# Patient Record
Sex: MALE | Race: WHITE | NOT HISPANIC OR LATINO
[De-identification: names, ages, dates, MRNs, and addresses within clinical notes are randomized per-mention and may not be internally consistent; named-entity substitution may affect disease eponyms.]

---

## 2019-07-22 ENCOUNTER — APPOINTMENT (OUTPATIENT)
Dept: OTOLARYNGOLOGY | Facility: CLINIC | Age: 79
End: 2019-07-22
Payer: MEDICARE

## 2019-07-22 VITALS
SYSTOLIC BLOOD PRESSURE: 167 MMHG | DIASTOLIC BLOOD PRESSURE: 104 MMHG | HEIGHT: 70 IN | HEART RATE: 77 BPM | WEIGHT: 208 LBS | BODY MASS INDEX: 29.78 KG/M2

## 2019-07-22 VITALS — HEART RATE: 89 BPM | SYSTOLIC BLOOD PRESSURE: 159 MMHG | DIASTOLIC BLOOD PRESSURE: 93 MMHG

## 2019-07-22 DIAGNOSIS — Z86.39 PERSONAL HISTORY OF OTHER ENDOCRINE, NUTRITIONAL AND METABOLIC DISEASE: ICD-10-CM

## 2019-07-22 DIAGNOSIS — Z86.79 PERSONAL HISTORY OF OTHER DISEASES OF THE CIRCULATORY SYSTEM: ICD-10-CM

## 2019-07-22 DIAGNOSIS — J38.2 NODULES OF VOCAL CORDS: ICD-10-CM

## 2019-07-22 PROCEDURE — 31575 DIAGNOSTIC LARYNGOSCOPY: CPT

## 2019-07-22 PROCEDURE — 99204 OFFICE O/P NEW MOD 45 MIN: CPT | Mod: 25

## 2019-07-22 RX ORDER — ALLOPURINOL 100 MG/1
100 TABLET ORAL
Refills: 0 | Status: ACTIVE | COMMUNITY

## 2019-07-22 RX ORDER — RIVAROXABAN 15 MG/1
15 TABLET, FILM COATED ORAL
Refills: 0 | Status: ACTIVE | COMMUNITY

## 2019-07-22 NOTE — PHYSICAL EXAM
[Laryngoscopy Performed] : laryngoscopy was performed, see procedure section for findings [Normal] : no rashes [FreeTextEntry1] : No concerning lesions in the OC/OPx on inspection or palpation.\par

## 2019-07-22 NOTE — PROCEDURE
[Lesion] : lesion identified by mirror examination needing further evaluation [Flexible Endoscope] : examined with the flexible endoscope [None] : none [de-identified] : No lesions in the NPx, OPx, HPx.  Exam of the larynx with irregular leukoplakic lesion involving the left anterior VC.  VC are mobile, airway patent.\par

## 2019-07-22 NOTE — CONSULT LETTER
[Dear  ___] : Dear  [unfilled], [Consult Letter:] : I had the pleasure of evaluating your patient, [unfilled]. [Consult Closing:] : Thank you very much for allowing me to participate in the care of this patient.  If you have any questions, please do not hesitate to contact me. [Please see my note below.] : Please see my note below. [Sincerely,] : Sincerely, [FreeTextEntry2] : Dr. Pablo Paz\[address],\[place] [address] [FreeTextEntry3] : Dev

## 2019-08-06 ENCOUNTER — OUTPATIENT (OUTPATIENT)
Dept: OUTPATIENT SERVICES | Facility: HOSPITAL | Age: 79
LOS: 1 days | End: 2019-08-06
Payer: MEDICARE

## 2019-08-06 VITALS
HEART RATE: 62 BPM | SYSTOLIC BLOOD PRESSURE: 140 MMHG | HEIGHT: 69 IN | WEIGHT: 205.91 LBS | RESPIRATION RATE: 16 BRPM | DIASTOLIC BLOOD PRESSURE: 86 MMHG | TEMPERATURE: 97 F

## 2019-08-06 DIAGNOSIS — Z98.890 OTHER SPECIFIED POSTPROCEDURAL STATES: Chronic | ICD-10-CM

## 2019-08-06 DIAGNOSIS — J38.3 OTHER DISEASES OF VOCAL CORDS: ICD-10-CM

## 2019-08-06 LAB
ANION GAP SERPL CALC-SCNC: 13 MMO/L — SIGNIFICANT CHANGE UP (ref 7–14)
BUN SERPL-MCNC: 20 MG/DL — SIGNIFICANT CHANGE UP (ref 7–23)
CALCIUM SERPL-MCNC: 9.7 MG/DL — SIGNIFICANT CHANGE UP (ref 8.4–10.5)
CHLORIDE SERPL-SCNC: 101 MMOL/L — SIGNIFICANT CHANGE UP (ref 98–107)
CO2 SERPL-SCNC: 25 MMOL/L — SIGNIFICANT CHANGE UP (ref 22–31)
CREAT SERPL-MCNC: 1.65 MG/DL — HIGH (ref 0.5–1.3)
GLUCOSE SERPL-MCNC: 129 MG/DL — HIGH (ref 70–99)
HBA1C BLD-MCNC: 6.6 % — HIGH (ref 4–5.6)
HCT VFR BLD CALC: 49.9 % — SIGNIFICANT CHANGE UP (ref 39–50)
HGB BLD-MCNC: 16.5 G/DL — SIGNIFICANT CHANGE UP (ref 13–17)
MCHC RBC-ENTMCNC: 30.1 PG — SIGNIFICANT CHANGE UP (ref 27–34)
MCHC RBC-ENTMCNC: 33.1 % — SIGNIFICANT CHANGE UP (ref 32–36)
MCV RBC AUTO: 90.9 FL — SIGNIFICANT CHANGE UP (ref 80–100)
NRBC # FLD: 0 K/UL — SIGNIFICANT CHANGE UP (ref 0–0)
PLATELET # BLD AUTO: 188 K/UL — SIGNIFICANT CHANGE UP (ref 150–400)
PMV BLD: 11.2 FL — SIGNIFICANT CHANGE UP (ref 7–13)
POTASSIUM SERPL-MCNC: 4.1 MMOL/L — SIGNIFICANT CHANGE UP (ref 3.5–5.3)
POTASSIUM SERPL-SCNC: 4.1 MMOL/L — SIGNIFICANT CHANGE UP (ref 3.5–5.3)
RBC # BLD: 5.49 M/UL — SIGNIFICANT CHANGE UP (ref 4.2–5.8)
RBC # FLD: 13.6 % — SIGNIFICANT CHANGE UP (ref 10.3–14.5)
SODIUM SERPL-SCNC: 139 MMOL/L — SIGNIFICANT CHANGE UP (ref 135–145)
WBC # BLD: 7.77 K/UL — SIGNIFICANT CHANGE UP (ref 3.8–10.5)
WBC # FLD AUTO: 7.77 K/UL — SIGNIFICANT CHANGE UP (ref 3.8–10.5)

## 2019-08-06 PROCEDURE — 93010 ELECTROCARDIOGRAM REPORT: CPT

## 2019-08-06 RX ORDER — SODIUM CHLORIDE 9 MG/ML
1000 INJECTION, SOLUTION INTRAVENOUS
Refills: 0 | Status: DISCONTINUED | OUTPATIENT
Start: 2019-08-08 | End: 2019-08-30

## 2019-08-06 NOTE — H&P PST ADULT - LYMPHATIC
supraclavicular L/posterior cervical L/anterior cervical L/anterior cervical R/posterior cervical R/supraclavicular R

## 2019-08-06 NOTE — H&P PST ADULT - NSICDXPASTMEDICALHX_GEN_ALL_CORE_FT
PAST MEDICAL HISTORY:  Atrial fibrillation     GERD (gastroesophageal reflux disease)     Gout     Hypertension     Obesity     Other diseases of vocal cords     Reflux     Renal cancer     Type 2 diabetes mellitus

## 2019-08-06 NOTE — H&P PST ADULT - NSICDXPROBLEM_GEN_ALL_CORE_FT
other diseases of vocal cord:  direct laryngoscopy with biopsy, esophagoscopy on 8/8/2019  Preop instructions, gi prophylaxis   pt verbalized understanding    Hypertension:  continue medication per routine schedule    Atrial fibrillation:  on     Type II DM:  Fs on admit other diseases of vocal cord:  direct laryngoscopy with biopsy, esophagoscopy on 8/8/2019  Preop instructions, gi prophylaxis   pt verbalized understanding    Hypertension:  continue medication per routine schedule  LINDA precautions recommended.  OR booking notified via fax.    Atrial fibrillation:  on Xarelto- last dose 8/5/19, per pt instructed by cardiologist   cardiology eval requested by surgeon  pending copy of most recent echo/stress and cardiology eval report     Type II DM:  Fs on admit  Last dose Januvia on 8/6/19 pm dose

## 2019-08-06 NOTE — H&P PST ADULT - HISTORY OF PRESENT ILLNESS
78y/o male 80y/o male presents for preop eval for scheduled direct laryngoscopy with biopsy, esophagoscopy on 8/8/2019.  Pt with c/o voice disturbance for few years.  Per pt recent ENT eval done and was told has vocal cord lesions.  Preop dx other diseases of vocal cords.

## 2019-08-07 ENCOUNTER — TRANSCRIPTION ENCOUNTER (OUTPATIENT)
Age: 79
End: 2019-08-07

## 2019-08-07 NOTE — ASU PATIENT PROFILE, ADULT - TEACHING/LEARNING CULTURAL CONSIDERATIONS
Detail Level: Zone
Discussed with patient of all possible trigger factors such as stress induced and weather changes. Explained to patient that the condition is chronic. Will start patient on a topical treatment plan.
Clinical photo was taken today for biopsy purpose.
none

## 2019-08-07 NOTE — ASU PATIENT PROFILE, ADULT - PMH
Atrial fibrillation    GERD (gastroesophageal reflux disease)    Gout    Hypertension    Obesity    Other diseases of vocal cords    Reflux    Renal cancer    Type 2 diabetes mellitus

## 2019-08-08 ENCOUNTER — RESULT REVIEW (OUTPATIENT)
Age: 79
End: 2019-08-08

## 2019-08-08 ENCOUNTER — APPOINTMENT (OUTPATIENT)
Dept: OTOLARYNGOLOGY | Facility: HOSPITAL | Age: 79
End: 2019-08-08

## 2019-08-08 ENCOUNTER — OUTPATIENT (OUTPATIENT)
Dept: OUTPATIENT SERVICES | Facility: HOSPITAL | Age: 79
LOS: 1 days | Discharge: HOME | End: 2019-08-08
Payer: MEDICARE

## 2019-08-08 VITALS
DIASTOLIC BLOOD PRESSURE: 85 MMHG | RESPIRATION RATE: 16 BRPM | HEART RATE: 70 BPM | OXYGEN SATURATION: 100 % | SYSTOLIC BLOOD PRESSURE: 138 MMHG

## 2019-08-08 VITALS
OXYGEN SATURATION: 96 % | WEIGHT: 205.91 LBS | HEIGHT: 69 IN | TEMPERATURE: 98 F | RESPIRATION RATE: 14 BRPM | DIASTOLIC BLOOD PRESSURE: 98 MMHG | HEART RATE: 80 BPM | SYSTOLIC BLOOD PRESSURE: 142 MMHG

## 2019-08-08 DIAGNOSIS — J38.3 OTHER DISEASES OF VOCAL CORDS: ICD-10-CM

## 2019-08-08 DIAGNOSIS — Z98.890 OTHER SPECIFIED POSTPROCEDURAL STATES: Chronic | ICD-10-CM

## 2019-08-08 PROCEDURE — 43191 ESOPHAGOSCOPY RIGID TRNSO DX: CPT | Mod: GC

## 2019-08-08 PROCEDURE — 88305 TISSUE EXAM BY PATHOLOGIST: CPT | Mod: 26

## 2019-08-08 PROCEDURE — 31541 LARYNSCOP W/TUMR EXC + SCOPE: CPT | Mod: GC

## 2019-08-08 PROCEDURE — 88331 PATH CONSLTJ SURG 1 BLK 1SPC: CPT | Mod: 26

## 2019-08-08 RX ORDER — ACETAMINOPHEN 500 MG
650 TABLET ORAL EVERY 6 HOURS
Refills: 0 | Status: DISCONTINUED | OUTPATIENT
Start: 2019-08-08 | End: 2019-08-30

## 2019-08-08 RX ORDER — ACETAMINOPHEN 500 MG
20.31 TABLET ORAL
Qty: 0 | Refills: 0 | DISCHARGE
Start: 2019-08-08

## 2019-08-08 RX ADMIN — Medication 650 MILLIGRAM(S): at 19:30

## 2019-08-08 NOTE — ASU DISCHARGE PLAN (ADULT/PEDIATRIC) - CARE PROVIDER_API CALL
Ron Gibbons)  Otolaryngology  [address] [place] [address]  Phone: [phone]  Fax: [phone]  Follow Up Time:

## 2019-08-12 ENCOUNTER — APPOINTMENT (OUTPATIENT)
Dept: OTOLARYNGOLOGY | Facility: CLINIC | Age: 79
End: 2019-08-12
Payer: MEDICARE

## 2019-08-12 VITALS
WEIGHT: 208 LBS | DIASTOLIC BLOOD PRESSURE: 86 MMHG | BODY MASS INDEX: 29.78 KG/M2 | HEIGHT: 70 IN | HEART RATE: 81 BPM | SYSTOLIC BLOOD PRESSURE: 144 MMHG

## 2019-08-12 PROBLEM — I48.91 UNSPECIFIED ATRIAL FIBRILLATION: Chronic | Status: ACTIVE | Noted: 2019-08-06

## 2019-08-12 PROBLEM — K21.9 GASTRO-ESOPHAGEAL REFLUX DISEASE WITHOUT ESOPHAGITIS: Chronic | Status: ACTIVE | Noted: 2019-08-06

## 2019-08-12 PROBLEM — E66.9 OBESITY, UNSPECIFIED: Chronic | Status: ACTIVE | Noted: 2019-08-06

## 2019-08-12 PROBLEM — M10.9 GOUT, UNSPECIFIED: Chronic | Status: ACTIVE | Noted: 2019-08-06

## 2019-08-12 PROCEDURE — 31575 DIAGNOSTIC LARYNGOSCOPY: CPT

## 2019-08-12 PROCEDURE — 99214 OFFICE O/P EST MOD 30 MIN: CPT | Mod: 25

## 2019-08-12 NOTE — PHYSICAL EXAM
[Normal] : no rashes [Laryngoscopy Performed] : laryngoscopy was performed, see procedure section for findings [FreeTextEntry1] : No concerning lesions in the OC/OPx on inspection or palpation.\par

## 2019-08-12 NOTE — PROCEDURE
[Lesion] : lesion identified by mirror examination needing further evaluation [Flexible Endoscope] : examined with the flexible endoscope [None] : none [Serial Number: ___] : Serial Number: [unfilled] [de-identified] : No lesions in the NPx, OPx, HPx. Exam of the larynx with irregular leukoplakic lesion involving the left anterior VC. VC are mobile, airway patent.

## 2019-08-12 NOTE — HISTORY OF PRESENT ILLNESS
[de-identified] : 78 y/o male refer by Dr. Paz for left vocal cord lesion. pt has hx of hoarseness on and off and recently went to see Dr. Paz and did laryngoscopy showed left VC with rough irregularity. + ETOH - beers 2-3, quit 20 yrs ago smoking.  He has an approx. 120 pack year history. 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  He has no major c.o, mild sore throat, no dysphagia, dyspnea, or weight loss or  hemoptysis.

## 2019-08-12 NOTE — CONSULT LETTER
[Dear  ___] : Dear  [unfilled], [Courtesy Letter:] : I had the pleasure of seeing your patient, [unfilled], in my office today. [Please see my note below.] : Please see my note below. [Consult Closing:] : Thank you very much for allowing me to participate in the care of this patient.  If you have any questions, please do not hesitate to contact me. [Sincerely,] : Sincerely, [FreeTextEntry3] : Dev [FreeTextEntry2] : Dr. Pablo Paz\[address],\[place] [address]

## 2019-08-22 ENCOUNTER — OUTPATIENT (OUTPATIENT)
Dept: OUTPATIENT SERVICES | Facility: HOSPITAL | Age: 79
LOS: 1 days | Discharge: HOME | End: 2019-08-22
Payer: MEDICARE

## 2019-08-22 ENCOUNTER — APPOINTMENT (OUTPATIENT)
Dept: RADIATION ONCOLOGY | Facility: CLINIC | Age: 79
End: 2019-08-22
Payer: MEDICARE

## 2019-08-22 VITALS
HEART RATE: 76 BPM | SYSTOLIC BLOOD PRESSURE: 142 MMHG | WEIGHT: 206 LBS | BODY MASS INDEX: 29.56 KG/M2 | RESPIRATION RATE: 17 BRPM | DIASTOLIC BLOOD PRESSURE: 85 MMHG | OXYGEN SATURATION: 94 %

## 2019-08-22 DIAGNOSIS — Z98.890 OTHER SPECIFIED POSTPROCEDURAL STATES: Chronic | ICD-10-CM

## 2019-08-22 PROCEDURE — 77263 THER RADIOLOGY TX PLNG CPLX: CPT

## 2019-08-22 PROCEDURE — 92511 NASOPHARYNGOSCOPY: CPT

## 2019-08-22 PROCEDURE — 99204 OFFICE O/P NEW MOD 45 MIN: CPT | Mod: 25

## 2019-08-22 NOTE — PHYSICAL EXAM
[Normal] : normoactive bowel sounds, soft and nontender, no hepatosplenomegaly or masses appreciated [de-identified] : partially edentulous [de-identified] : surgical scar right knee, history of tendon repair

## 2019-08-22 NOTE — DISEASE MANAGEMENT
[Clinical] : TNM Stage: c [I] : I [FreeTextEntry4] : squamous cell carcinoma [NTNM] : 0 [MTNM] : 0 [TTNM] : 1b

## 2019-08-22 NOTE — VITALS
[Maximal Pain Intensity: 0/10] : 0/10 [Least Pain Intensity: 0/10] : 0/10 [NoTreatment Scheduled] : no treatment scheduled [ECOG Performance Status: 1 - Restricted in physically strenuous activity but ambulatory and able to carry out work of a light or sedentary nature] : Performance Status: 1 - Restricted in physically strenuous activity but ambulatory and able to carry out work of a light or sedentary nature, e.g., light house work, office work [0 - No Distress] : Distress Level: 0 [90: Able to carry normal activity; minor signs or symptoms of disease.] : 90: Able to carry normal activity; minor signs or symptoms of disease.

## 2019-08-22 NOTE — REVIEW OF SYSTEMS
[Hoarseness] : hoarseness [Negative] : Heme/Lymph [Dysphagia] : no dysphagia [Loss of Hearing] : no loss of hearing [Nosebleeds] : no nosebleeds [Odynophagia] : no odynophagia [Mucosal Pain] : no mucosal pain [Palpitations] : no palpitations [Chest Pain] : no chest pain [Leg Claudication] : no intermittent leg claudication [Lower Ext Edema] : no lower extremity edema [FreeTextEntry4] : intermittent hoarseness  [FreeTextEntry5] : a-fib [FreeTextEntry9] : gout [de-identified] : diabetes

## 2019-08-22 NOTE — LETTER CLOSING
[Consult Closing:] : Thank you for allowing me to participate in the care of this patient.  If you have any questions, please do not hesitate to contact me. [Sincerely yours,] : Sincerely yours, [FreeTextEntry3] : Dominick Loaiza MD\par Physician in Chief\par Department of Radiation Medicine\par [facility]\par [facility]\par \par  of Radiation Medicine\par [facility]\par at  [facility]\par \par Radiation \par [facility]/\par [facility] [place]\par [address] [facility]\par [place], [place] [address]\par \par Tel: [phone]\par Fax: [phone]\par

## 2019-08-22 NOTE — LETTER GREETING
[Dear Doctor] : Dear Doctor, [Consult Letter:] : Your patient, [unfilled] was seen in my office today for consultation. [Please see my note below.] : Please see my note below. [FreeTextEntry2] : Ron Gibbons MD

## 2019-08-22 NOTE — HISTORY OF PRESENT ILLNESS
[FreeTextEntry1] : This 80 y/o male presents for radiation medicine consultation accompanied by his daughter Tamy.  He was initially referred to Dr Gibbons by Dr. 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  Patient reports he had intermittent hoarseness and went to see Dr. Paz.  Laryngoscopy showed left VC with rough irregularity.  Mr. Zarate is now s/p post direct laryngoscopy with bx on 8/8/2019 which revealed a squamous cell carcinoma suspicious for infiltration .   He will be scheduling PET/CT as soon as possible.  He also plans visit his dentist for clearance.  He reports a permanent upper denture and removable lower denture.\par

## 2019-08-22 NOTE — REASON FOR VISIT
[Consideration of Curative Therapy] : consideration of curative therapy for [Head and Neck Cancer] : head and neck cancer [Family Member] : family member

## 2019-08-28 ENCOUNTER — FORM ENCOUNTER (OUTPATIENT)
Age: 79
End: 2019-08-28

## 2019-08-29 ENCOUNTER — APPOINTMENT (OUTPATIENT)
Dept: NUCLEAR MEDICINE | Facility: CLINIC | Age: 79
End: 2019-08-29
Payer: MEDICARE

## 2019-08-29 ENCOUNTER — OUTPATIENT (OUTPATIENT)
Dept: OUTPATIENT SERVICES | Facility: HOSPITAL | Age: 79
LOS: 1 days | End: 2019-08-29

## 2019-08-29 DIAGNOSIS — Z98.890 OTHER SPECIFIED POSTPROCEDURAL STATES: Chronic | ICD-10-CM

## 2019-08-29 DIAGNOSIS — C32.0 MALIGNANT NEOPLASM OF GLOTTIS: ICD-10-CM

## 2019-08-29 PROCEDURE — 78815 PET IMAGE W/CT SKULL-THIGH: CPT | Mod: 26,PI

## 2019-09-23 ENCOUNTER — APPOINTMENT (OUTPATIENT)
Dept: OTOLARYNGOLOGY | Facility: CLINIC | Age: 79
End: 2019-09-23
Payer: MEDICARE

## 2019-09-23 VITALS
WEIGHT: 204 LBS | HEART RATE: 84 BPM | HEIGHT: 70 IN | DIASTOLIC BLOOD PRESSURE: 103 MMHG | SYSTOLIC BLOOD PRESSURE: 169 MMHG | BODY MASS INDEX: 29.2 KG/M2

## 2019-09-23 PROCEDURE — 31575 DIAGNOSTIC LARYNGOSCOPY: CPT

## 2019-09-23 PROCEDURE — 99214 OFFICE O/P EST MOD 30 MIN: CPT | Mod: 25

## 2019-09-23 NOTE — CONSULT LETTER
[Dear  ___] : Dear  [unfilled], [Courtesy Letter:] : I had the pleasure of seeing your patient, [unfilled], in my office today. [Please see my note below.] : Please see my note below. [Consult Closing:] : Thank you very much for allowing me to participate in the care of this patient.  If you have any questions, please do not hesitate to contact me. [Sincerely,] : Sincerely, [FreeTextEntry2] : Dr. Pablo Paz\[address],\[place] [address]  [FreeTextEntry3] : Dev

## 2019-09-23 NOTE — PROCEDURE
[Lesion] : lesion identified by mirror examination needing further evaluation [None] : none [Flexible Endoscope] : examined with the flexible endoscope [Serial Number: ___] : Serial Number: [unfilled] [de-identified] : No lesions in the NPx, OPx, HPx. Exam of the larynx with irregular leukoplakic lesion involving the left anterior VC. VC are mobile, airway patent.

## 2019-09-23 NOTE — HISTORY OF PRESENT ILLNESS
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 pt has hx of hoarseness on and off and recently went to see Dr. Paz and did laryngoscopy showed left VC with rough irregularity. + ETOH - beers 2-3, quit 20 yrs ago smoking.  He has an approx. 120 pack year history. 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  He has no major c.o, mild sore throat, no dysphagia, dyspnea, or weight loss or  hemoptysis. pt is being f/u dr. Loaiza pending  radiation tx after dentist clearance. pt had clearance for dentist and awaiting for Dr. Loaiza to sched. pt is also here for PET ct result.

## 2019-09-24 ENCOUNTER — FORM ENCOUNTER (OUTPATIENT)
Age: 79
End: 2019-09-24

## 2019-09-25 ENCOUNTER — OUTPATIENT (OUTPATIENT)
Dept: OUTPATIENT SERVICES | Facility: HOSPITAL | Age: 79
LOS: 1 days | End: 2019-09-25
Payer: MEDICARE

## 2019-09-25 ENCOUNTER — APPOINTMENT (OUTPATIENT)
Dept: CT IMAGING | Facility: CLINIC | Age: 79
End: 2019-09-25
Payer: MEDICARE

## 2019-09-25 DIAGNOSIS — Z98.890 OTHER SPECIFIED POSTPROCEDURAL STATES: Chronic | ICD-10-CM

## 2019-09-25 DIAGNOSIS — C32.0 MALIGNANT NEOPLASM OF GLOTTIS: ICD-10-CM

## 2019-09-25 DIAGNOSIS — R91.1 SOLITARY PULMONARY NODULE: ICD-10-CM

## 2019-09-25 PROCEDURE — 71250 CT THORAX DX C-: CPT

## 2019-09-25 PROCEDURE — 71250 CT THORAX DX C-: CPT | Mod: 26

## 2019-10-07 PROCEDURE — 77300 RADIATION THERAPY DOSE PLAN: CPT | Mod: 26

## 2019-10-07 PROCEDURE — 77301 RADIOTHERAPY DOSE PLAN IMRT: CPT | Mod: 26

## 2019-10-07 PROCEDURE — 77338 DESIGN MLC DEVICE FOR IMRT: CPT | Mod: 26

## 2019-10-15 ENCOUNTER — APPOINTMENT (OUTPATIENT)
Dept: THORACIC SURGERY | Facility: CLINIC | Age: 79
End: 2019-10-15
Payer: MEDICARE

## 2019-10-15 ENCOUNTER — APPOINTMENT (OUTPATIENT)
Dept: PULMONOLOGY | Facility: CLINIC | Age: 79
End: 2019-10-15
Payer: MEDICARE

## 2019-10-15 VITALS
BODY MASS INDEX: 29.62 KG/M2 | HEART RATE: 84 BPM | WEIGHT: 200 LBS | SYSTOLIC BLOOD PRESSURE: 158 MMHG | TEMPERATURE: 97.7 F | RESPIRATION RATE: 16 BRPM | OXYGEN SATURATION: 95 % | DIASTOLIC BLOOD PRESSURE: 98 MMHG | HEIGHT: 69 IN

## 2019-10-15 PROCEDURE — 94729 DIFFUSING CAPACITY: CPT

## 2019-10-15 PROCEDURE — 94060 EVALUATION OF WHEEZING: CPT

## 2019-10-15 PROCEDURE — 99205 OFFICE O/P NEW HI 60 MIN: CPT

## 2019-10-15 PROCEDURE — 94726 PLETHYSMOGRAPHY LUNG VOLUMES: CPT

## 2019-10-15 RX ORDER — LANSOPRAZOLE 30 MG/1
30 CAPSULE, DELAYED RELEASE ORAL
Refills: 0 | Status: ACTIVE | COMMUNITY

## 2019-10-15 RX ORDER — ALPRAZOLAM 0.25 MG/1
0.25 TABLET ORAL
Qty: 2 | Refills: 0 | Status: DISCONTINUED | COMMUNITY
Start: 2019-08-28 | End: 2019-10-15

## 2019-10-15 RX ORDER — LANSOPRAZOLE 30 MG/1
CAPSULE, DELAYED RELEASE ORAL
Refills: 0 | Status: DISCONTINUED | COMMUNITY
End: 2019-10-15

## 2019-10-17 ENCOUNTER — OUTPATIENT (OUTPATIENT)
Dept: OUTPATIENT SERVICES | Facility: HOSPITAL | Age: 79
LOS: 1 days | End: 2019-10-17

## 2019-10-17 VITALS
SYSTOLIC BLOOD PRESSURE: 126 MMHG | TEMPERATURE: 97 F | HEIGHT: 68 IN | RESPIRATION RATE: 16 BRPM | DIASTOLIC BLOOD PRESSURE: 84 MMHG | OXYGEN SATURATION: 96 % | HEART RATE: 90 BPM | WEIGHT: 199.96 LBS

## 2019-10-17 DIAGNOSIS — Z98.890 OTHER SPECIFIED POSTPROCEDURAL STATES: Chronic | ICD-10-CM

## 2019-10-17 DIAGNOSIS — K21.9 GASTRO-ESOPHAGEAL REFLUX DISEASE WITHOUT ESOPHAGITIS: ICD-10-CM

## 2019-10-17 DIAGNOSIS — I48.91 UNSPECIFIED ATRIAL FIBRILLATION: ICD-10-CM

## 2019-10-17 DIAGNOSIS — R91.8 OTHER NONSPECIFIC ABNORMAL FINDING OF LUNG FIELD: ICD-10-CM

## 2019-10-17 DIAGNOSIS — E11.9 TYPE 2 DIABETES MELLITUS WITHOUT COMPLICATIONS: ICD-10-CM

## 2019-10-17 DIAGNOSIS — E66.9 OBESITY, UNSPECIFIED: ICD-10-CM

## 2019-10-17 LAB
ALBUMIN SERPL ELPH-MCNC: 4.3 G/DL — SIGNIFICANT CHANGE UP (ref 3.3–5)
ALP SERPL-CCNC: 56 U/L — SIGNIFICANT CHANGE UP (ref 40–120)
ALT FLD-CCNC: 17 U/L — SIGNIFICANT CHANGE UP (ref 4–41)
ANION GAP SERPL CALC-SCNC: 12 MMO/L — SIGNIFICANT CHANGE UP (ref 7–14)
AST SERPL-CCNC: 17 U/L — SIGNIFICANT CHANGE UP (ref 4–40)
BILIRUB SERPL-MCNC: 0.4 MG/DL — SIGNIFICANT CHANGE UP (ref 0.2–1.2)
BLD GP AB SCN SERPL QL: NEGATIVE — SIGNIFICANT CHANGE UP
BUN SERPL-MCNC: 16 MG/DL — SIGNIFICANT CHANGE UP (ref 7–23)
CALCIUM SERPL-MCNC: 9.6 MG/DL — SIGNIFICANT CHANGE UP (ref 8.4–10.5)
CHLORIDE SERPL-SCNC: 102 MMOL/L — SIGNIFICANT CHANGE UP (ref 98–107)
CO2 SERPL-SCNC: 26 MMOL/L — SIGNIFICANT CHANGE UP (ref 22–31)
CREAT SERPL-MCNC: 1.73 MG/DL — HIGH (ref 0.5–1.3)
GLUCOSE SERPL-MCNC: 99 MG/DL — SIGNIFICANT CHANGE UP (ref 70–99)
HCT VFR BLD CALC: 50.5 % — HIGH (ref 39–50)
HGB BLD-MCNC: 15.5 G/DL — SIGNIFICANT CHANGE UP (ref 13–17)
MCHC RBC-ENTMCNC: 28.8 PG — SIGNIFICANT CHANGE UP (ref 27–34)
MCHC RBC-ENTMCNC: 30.7 % — LOW (ref 32–36)
MCV RBC AUTO: 93.9 FL — SIGNIFICANT CHANGE UP (ref 80–100)
NRBC # FLD: 0 K/UL — SIGNIFICANT CHANGE UP (ref 0–0)
PLATELET # BLD AUTO: 246 K/UL — SIGNIFICANT CHANGE UP (ref 150–400)
PMV BLD: 11.1 FL — SIGNIFICANT CHANGE UP (ref 7–13)
POTASSIUM SERPL-MCNC: 4.8 MMOL/L — SIGNIFICANT CHANGE UP (ref 3.5–5.3)
POTASSIUM SERPL-SCNC: 4.8 MMOL/L — SIGNIFICANT CHANGE UP (ref 3.5–5.3)
PROT SERPL-MCNC: 7.7 G/DL — SIGNIFICANT CHANGE UP (ref 6–8.3)
RBC # BLD: 5.38 M/UL — SIGNIFICANT CHANGE UP (ref 4.2–5.8)
RBC # FLD: 13.8 % — SIGNIFICANT CHANGE UP (ref 10.3–14.5)
RH IG SCN BLD-IMP: POSITIVE — SIGNIFICANT CHANGE UP
SODIUM SERPL-SCNC: 140 MMOL/L — SIGNIFICANT CHANGE UP (ref 135–145)
WBC # BLD: 7.41 K/UL — SIGNIFICANT CHANGE UP (ref 3.8–10.5)
WBC # FLD AUTO: 7.41 K/UL — SIGNIFICANT CHANGE UP (ref 3.8–10.5)

## 2019-10-17 RX ORDER — METOPROLOL TARTRATE 50 MG
1 TABLET ORAL
Qty: 0 | Refills: 0 | DISCHARGE

## 2019-10-17 NOTE — H&P PST ADULT - NEGATIVE CARDIOVASCULAR SYMPTOMS
no chest pain/no orthopnea/no paroxysmal nocturnal dyspnea/no peripheral edema/no claudication/no dyspnea on exertion/no palpitations

## 2019-10-17 NOTE — H&P PST ADULT - ASSESSMENT
78y/o male  with history of Afib, HTN, DM2,  RBBB, thoracic aortic aneurysm ( w/o rupture) , cancer of his vocal cords presents to PST unit with pre-op diagnosis of other nonspecific abnormal finding of lung field scheduled for flexible bronchoscopy, left video assisted thoracoscopy, lung resection on 10/21/2019.

## 2019-10-17 NOTE — H&P PST ADULT - NSICDXPASTMEDICALHX_GEN_ALL_CORE_FT
PAST MEDICAL HISTORY:  Atrial fibrillation     GERD (gastroesophageal reflux disease)     Gout     Hypertension     Obesity     Other diseases of vocal cords     Other nonspecific abnormal finding of lung field     Reflux     Renal cancer     Type 2 diabetes mellitus

## 2019-10-17 NOTE — H&P PST ADULT - NEGATIVE ENMT SYMPTOMS
no throat pain/no dysphagia/voice disturbance/no hearing difficulty/no vertigo/no sinus symptoms/no nasal congestion

## 2019-10-17 NOTE — H&P PST ADULT - NSICDXPROBLEM_GEN_ALL_CORE_FT
other diseases of vocal cord:  direct laryngoscopy with biopsy, esophagoscopy on 8/8/2019  Preop instructions, gi prophylaxis   pt verbalized understanding    Hypertension:  continue medication per routine schedule  LINDA precautions recommended.  OR booking notified via fax.    Atrial fibrillation:  on Xarelto- last dose 8/5/19, per pt instructed by cardiologist   cardiology eval requested by surgeon  pending copy of most recent echo/stress and cardiology eval report     Type II DM:  Fs on admit  Last dose Januvia on 8/6/19 pm dose PROBLEM DIAGNOSES  Problem: Other nonspecific abnormal finding of lung field  Assessment and Plan: Scheduled for flexible bronchoscopy, left video assisted thoracoscopy, lung resection on 10/21/2019.   Verbal and written pre-op instructions provided to patient. Patient verbalized understanding.   Lansoprazole for GI prophylaxis provided.   Patient given verbal and written instruction with teach back on chlorhexidine shampoo, and the patient verbalized understanding with return demonstration.     Problem: Atrial fibrillation  Assessment and Plan: Pending Cardiac eval as per surgeon request-copy requested.   Last dose of Xarelto 10/17     Problem: Obesity  Assessment and Plan: LINDA precautions-OR booking notified     Problem: Type 2 diabetes mellitus  Assessment and Plan: Pt. instructed to Januvia   morning of procedure. Accucheck DOS. OR booking notified of DM2

## 2019-10-17 NOTE — H&P PST ADULT - HISTORY OF PRESENT ILLNESS
80y/o male  with history of Afib, HTN, RBBB, thoracic aortic aneurysm ( w/o rupture) , cancer of his vocal cords presents to PST unit with pre-op diagnosis of other nonspecific abnormal finding of lung field scheduled for flexible bronchoscopy, left video assisted thoracoscopy, lung resection on 10/21/2019. He reports undergoing a PET scan due to his voca cord caner which revealed mets to lungs. 80y/o male  with history of Afib, HTN, DM2,  RBBB, thoracic aortic aneurysm ( w/o rupture) , cancer of his vocal cords presents to PST unit with pre-op diagnosis of other nonspecific abnormal finding of lung field scheduled for flexible bronchoscopy, left video assisted thoracoscopy, lung resection on 10/21/2019. He reports undergoing a PET scan due to his voca cord caner which revealed mets to lungs.

## 2019-10-18 RX ORDER — SODIUM CHLORIDE 9 MG/ML
1000 INJECTION INTRAMUSCULAR; INTRAVENOUS; SUBCUTANEOUS
Refills: 0 | Status: DISCONTINUED | OUTPATIENT
Start: 2019-10-21 | End: 2019-10-23

## 2019-10-21 ENCOUNTER — INPATIENT (INPATIENT)
Facility: HOSPITAL | Age: 79
LOS: 1 days | Discharge: HOME | End: 2019-10-23
Attending: THORACIC SURGERY (CARDIOTHORACIC VASCULAR SURGERY) | Admitting: THORACIC SURGERY (CARDIOTHORACIC VASCULAR SURGERY)
Payer: MEDICARE

## 2019-10-21 ENCOUNTER — RESULT REVIEW (OUTPATIENT)
Age: 79
End: 2019-10-21

## 2019-10-21 ENCOUNTER — APPOINTMENT (OUTPATIENT)
Dept: THORACIC SURGERY | Facility: HOSPITAL | Age: 79
End: 2019-10-21

## 2019-10-21 VITALS
WEIGHT: 199.96 LBS | HEIGHT: 68 IN | OXYGEN SATURATION: 95 % | DIASTOLIC BLOOD PRESSURE: 94 MMHG | HEART RATE: 69 BPM | TEMPERATURE: 98 F | SYSTOLIC BLOOD PRESSURE: 128 MMHG | RESPIRATION RATE: 18 BRPM

## 2019-10-21 DIAGNOSIS — R91.8 OTHER NONSPECIFIC ABNORMAL FINDING OF LUNG FIELD: ICD-10-CM

## 2019-10-21 DIAGNOSIS — Z98.890 OTHER SPECIFIED POSTPROCEDURAL STATES: Chronic | ICD-10-CM

## 2019-10-21 LAB
GLUCOSE BLDC GLUCOMTR-MCNC: 151 MG/DL — HIGH (ref 70–99)
GLUCOSE BLDC GLUCOMTR-MCNC: 96 MG/DL — SIGNIFICANT CHANGE UP (ref 70–99)
GRAM STN WND: SIGNIFICANT CHANGE UP
RH IG SCN BLD-IMP: POSITIVE — SIGNIFICANT CHANGE UP
SPECIMEN SOURCE: SIGNIFICANT CHANGE UP

## 2019-10-21 PROCEDURE — 88307 TISSUE EXAM BY PATHOLOGIST: CPT | Mod: 26

## 2019-10-21 PROCEDURE — 88313 SPECIAL STAINS GROUP 2: CPT | Mod: 26

## 2019-10-21 PROCEDURE — 32674 THORACOSCOPY LYMPH NODE EXC: CPT

## 2019-10-21 PROCEDURE — 88309 TISSUE EXAM BY PATHOLOGIST: CPT | Mod: 26

## 2019-10-21 PROCEDURE — 32663 THORACOSCOPY W/LOBECTOMY: CPT

## 2019-10-21 PROCEDURE — 88331 PATH CONSLTJ SURG 1 BLK 1SPC: CPT | Mod: 26

## 2019-10-21 PROCEDURE — 88360 TUMOR IMMUNOHISTOCHEM/MANUAL: CPT | Mod: 26

## 2019-10-21 PROCEDURE — 99233 SBSQ HOSP IP/OBS HIGH 50: CPT

## 2019-10-21 PROCEDURE — 71045 X-RAY EXAM CHEST 1 VIEW: CPT | Mod: 26

## 2019-10-21 PROCEDURE — 88305 TISSUE EXAM BY PATHOLOGIST: CPT | Mod: 26

## 2019-10-21 RX ORDER — DEXTROSE 50 % IN WATER 50 %
15 SYRINGE (ML) INTRAVENOUS ONCE
Refills: 0 | Status: DISCONTINUED | OUTPATIENT
Start: 2019-10-21 | End: 2019-10-22

## 2019-10-21 RX ORDER — SODIUM CHLORIDE 9 MG/ML
1000 INJECTION, SOLUTION INTRAVENOUS
Refills: 0 | Status: DISCONTINUED | OUTPATIENT
Start: 2019-10-21 | End: 2019-10-22

## 2019-10-21 RX ORDER — INSULIN LISPRO 100/ML
VIAL (ML) SUBCUTANEOUS AT BEDTIME
Refills: 0 | Status: DISCONTINUED | OUTPATIENT
Start: 2019-10-21 | End: 2019-10-23

## 2019-10-21 RX ORDER — PANTOPRAZOLE SODIUM 20 MG/1
40 TABLET, DELAYED RELEASE ORAL
Refills: 0 | Status: DISCONTINUED | OUTPATIENT
Start: 2019-10-21 | End: 2019-10-23

## 2019-10-21 RX ORDER — DOCUSATE SODIUM 100 MG
100 CAPSULE ORAL THREE TIMES A DAY
Refills: 0 | Status: DISCONTINUED | OUTPATIENT
Start: 2019-10-21 | End: 2019-10-23

## 2019-10-21 RX ORDER — METOPROLOL TARTRATE 50 MG
100 TABLET ORAL DAILY
Refills: 0 | Status: DISCONTINUED | OUTPATIENT
Start: 2019-10-21 | End: 2019-10-23

## 2019-10-21 RX ORDER — INSULIN LISPRO 100/ML
VIAL (ML) SUBCUTANEOUS
Refills: 0 | Status: DISCONTINUED | OUTPATIENT
Start: 2019-10-21 | End: 2019-10-23

## 2019-10-21 RX ORDER — HEPARIN SODIUM 5000 [USP'U]/ML
5000 INJECTION INTRAVENOUS; SUBCUTANEOUS EVERY 8 HOURS
Refills: 0 | Status: DISCONTINUED | OUTPATIENT
Start: 2019-10-21 | End: 2019-10-23

## 2019-10-21 RX ORDER — HYDROMORPHONE HYDROCHLORIDE 2 MG/ML
0.5 INJECTION INTRAMUSCULAR; INTRAVENOUS; SUBCUTANEOUS
Refills: 0 | Status: DISCONTINUED | OUTPATIENT
Start: 2019-10-21 | End: 2019-10-23

## 2019-10-21 RX ORDER — GLUCAGON INJECTION, SOLUTION 0.5 MG/.1ML
1 INJECTION, SOLUTION SUBCUTANEOUS ONCE
Refills: 0 | Status: DISCONTINUED | OUTPATIENT
Start: 2019-10-21 | End: 2019-10-22

## 2019-10-21 RX ORDER — SENNA PLUS 8.6 MG/1
2 TABLET ORAL AT BEDTIME
Refills: 0 | Status: DISCONTINUED | OUTPATIENT
Start: 2019-10-21 | End: 2019-10-23

## 2019-10-21 RX ORDER — DEXTROSE 50 % IN WATER 50 %
25 SYRINGE (ML) INTRAVENOUS ONCE
Refills: 0 | Status: DISCONTINUED | OUTPATIENT
Start: 2019-10-21 | End: 2019-10-22

## 2019-10-21 RX ORDER — DEXTROSE 50 % IN WATER 50 %
25 SYRINGE (ML) INTRAVENOUS ONCE
Refills: 0 | Status: DISCONTINUED | OUTPATIENT
Start: 2019-10-21 | End: 2019-10-23

## 2019-10-21 RX ORDER — ONDANSETRON 8 MG/1
4 TABLET, FILM COATED ORAL EVERY 6 HOURS
Refills: 0 | Status: DISCONTINUED | OUTPATIENT
Start: 2019-10-21 | End: 2019-10-23

## 2019-10-21 RX ORDER — DEXTROSE 50 % IN WATER 50 %
12.5 SYRINGE (ML) INTRAVENOUS ONCE
Refills: 0 | Status: DISCONTINUED | OUTPATIENT
Start: 2019-10-21 | End: 2019-10-22

## 2019-10-21 RX ORDER — NALOXONE HYDROCHLORIDE 4 MG/.1ML
0.1 SPRAY NASAL
Refills: 0 | Status: DISCONTINUED | OUTPATIENT
Start: 2019-10-21 | End: 2019-10-23

## 2019-10-21 RX ORDER — HYDROMORPHONE HYDROCHLORIDE 2 MG/ML
30 INJECTION INTRAMUSCULAR; INTRAVENOUS; SUBCUTANEOUS
Refills: 0 | Status: DISCONTINUED | OUTPATIENT
Start: 2019-10-21 | End: 2019-10-23

## 2019-10-21 RX ADMIN — HYDROMORPHONE HYDROCHLORIDE 30 MILLILITER(S): 2 INJECTION INTRAMUSCULAR; INTRAVENOUS; SUBCUTANEOUS at 23:03

## 2019-10-21 NOTE — ASU PREOP CHECKLIST - RESPIRATORY RATE (BREATHS/MIN)
18 I will STOP taking the medications listed below when I get home from the hospital:    vancomycin 125 mg oral capsule  -- 1 cap(s) by mouth every 6 hours  -- Finish all this medication unless otherwise directed by prescriber.

## 2019-10-21 NOTE — ASU PREOP CHECKLIST - SELECT TESTS ORDERED
POCT Blood Glucose/CBC/CT/EKG/BMP POCT Blood Glucose/CBC/BMP/EKG/FS - 96/Type and Cross/Type and Screen

## 2019-10-21 NOTE — ASU PREOP CHECKLIST - 3.
Report from Jocelyn RN ASU at 3:15 pm Report from Jocelyn RN ASU at 3:15 pm report from nh to km @1600

## 2019-10-22 LAB
ANION GAP SERPL CALC-SCNC: 15 MMO/L — HIGH (ref 7–14)
BASOPHILS # BLD AUTO: 0.02 K/UL — SIGNIFICANT CHANGE UP (ref 0–0.2)
BASOPHILS NFR BLD AUTO: 0.2 % — SIGNIFICANT CHANGE UP (ref 0–2)
BUN SERPL-MCNC: 17 MG/DL — SIGNIFICANT CHANGE UP (ref 7–23)
CALCIUM SERPL-MCNC: 8.7 MG/DL — SIGNIFICANT CHANGE UP (ref 8.4–10.5)
CHLORIDE SERPL-SCNC: 100 MMOL/L — SIGNIFICANT CHANGE UP (ref 98–107)
CO2 SERPL-SCNC: 22 MMOL/L — SIGNIFICANT CHANGE UP (ref 22–31)
CREAT SERPL-MCNC: 1.46 MG/DL — HIGH (ref 0.5–1.3)
CULTURE - ACID FAST SMEAR CONCENTRATED: SIGNIFICANT CHANGE UP
CULTURE - ACID FAST SMEAR CONCENTRATED: SIGNIFICANT CHANGE UP
EOSINOPHIL # BLD AUTO: 0.01 K/UL — SIGNIFICANT CHANGE UP (ref 0–0.5)
EOSINOPHIL NFR BLD AUTO: 0.1 % — SIGNIFICANT CHANGE UP (ref 0–6)
GLUCOSE BLDC GLUCOMTR-MCNC: 120 MG/DL — HIGH (ref 70–99)
GLUCOSE BLDC GLUCOMTR-MCNC: 135 MG/DL — HIGH (ref 70–99)
GLUCOSE BLDC GLUCOMTR-MCNC: 147 MG/DL — HIGH (ref 70–99)
GLUCOSE BLDC GLUCOMTR-MCNC: 160 MG/DL — HIGH (ref 70–99)
GLUCOSE SERPL-MCNC: 184 MG/DL — HIGH (ref 70–99)
HBA1C BLD-MCNC: 6.4 % — HIGH (ref 4–5.6)
HCT VFR BLD CALC: 45 % — SIGNIFICANT CHANGE UP (ref 39–50)
HGB BLD-MCNC: 14.5 G/DL — SIGNIFICANT CHANGE UP (ref 13–17)
IMM GRANULOCYTES NFR BLD AUTO: 0.4 % — SIGNIFICANT CHANGE UP (ref 0–1.5)
LYMPHOCYTES # BLD AUTO: 0.68 K/UL — LOW (ref 1–3.3)
LYMPHOCYTES # BLD AUTO: 5.2 % — LOW (ref 13–44)
MCHC RBC-ENTMCNC: 29.4 PG — SIGNIFICANT CHANGE UP (ref 27–34)
MCHC RBC-ENTMCNC: 32.2 % — SIGNIFICANT CHANGE UP (ref 32–36)
MCV RBC AUTO: 91.3 FL — SIGNIFICANT CHANGE UP (ref 80–100)
MONOCYTES # BLD AUTO: 1.09 K/UL — HIGH (ref 0–0.9)
MONOCYTES NFR BLD AUTO: 8.3 % — SIGNIFICANT CHANGE UP (ref 2–14)
NEUTROPHILS # BLD AUTO: 11.26 K/UL — HIGH (ref 1.8–7.4)
NEUTROPHILS NFR BLD AUTO: 85.8 % — HIGH (ref 43–77)
NRBC # FLD: 0 K/UL — SIGNIFICANT CHANGE UP (ref 0–0)
PLATELET # BLD AUTO: 188 K/UL — SIGNIFICANT CHANGE UP (ref 150–400)
PMV BLD: 10.4 FL — SIGNIFICANT CHANGE UP (ref 7–13)
POTASSIUM SERPL-MCNC: 4.8 MMOL/L — SIGNIFICANT CHANGE UP (ref 3.5–5.3)
POTASSIUM SERPL-SCNC: 4.8 MMOL/L — SIGNIFICANT CHANGE UP (ref 3.5–5.3)
RBC # BLD: 4.93 M/UL — SIGNIFICANT CHANGE UP (ref 4.2–5.8)
RBC # FLD: 13.8 % — SIGNIFICANT CHANGE UP (ref 10.3–14.5)
SODIUM SERPL-SCNC: 137 MMOL/L — SIGNIFICANT CHANGE UP (ref 135–145)
SPECIMEN SOURCE: SIGNIFICANT CHANGE UP
WBC # BLD: 13.11 K/UL — HIGH (ref 3.8–10.5)
WBC # FLD AUTO: 13.11 K/UL — HIGH (ref 3.8–10.5)

## 2019-10-22 PROCEDURE — 99233 SBSQ HOSP IP/OBS HIGH 50: CPT

## 2019-10-22 PROCEDURE — 71045 X-RAY EXAM CHEST 1 VIEW: CPT | Mod: 26,76

## 2019-10-22 RX ORDER — ACETAMINOPHEN 500 MG
1000 TABLET ORAL EVERY 6 HOURS
Refills: 0 | Status: COMPLETED | OUTPATIENT
Start: 2019-10-22 | End: 2019-10-22

## 2019-10-22 RX ORDER — SODIUM CHLORIDE 9 MG/ML
250 INJECTION INTRAMUSCULAR; INTRAVENOUS; SUBCUTANEOUS ONCE
Refills: 0 | Status: COMPLETED | OUTPATIENT
Start: 2019-10-22 | End: 2019-10-22

## 2019-10-22 RX ORDER — GABAPENTIN 400 MG/1
100 CAPSULE ORAL EVERY 12 HOURS
Refills: 0 | Status: DISCONTINUED | OUTPATIENT
Start: 2019-10-22 | End: 2019-10-23

## 2019-10-22 RX ORDER — INFLUENZA VIRUS VACCINE 15; 15; 15; 15 UG/.5ML; UG/.5ML; UG/.5ML; UG/.5ML
0.5 SUSPENSION INTRAMUSCULAR ONCE
Refills: 0 | Status: DISCONTINUED | OUTPATIENT
Start: 2019-10-22 | End: 2019-10-23

## 2019-10-22 RX ORDER — OXYCODONE HYDROCHLORIDE 5 MG/1
10 TABLET ORAL EVERY 6 HOURS
Refills: 0 | Status: DISCONTINUED | OUTPATIENT
Start: 2019-10-22 | End: 2019-10-22

## 2019-10-22 RX ORDER — ACETAMINOPHEN 500 MG
1000 TABLET ORAL ONCE
Refills: 0 | Status: COMPLETED | OUTPATIENT
Start: 2019-10-22 | End: 2019-10-22

## 2019-10-22 RX ORDER — OXYCODONE HYDROCHLORIDE 5 MG/1
5 TABLET ORAL EVERY 4 HOURS
Refills: 0 | Status: DISCONTINUED | OUTPATIENT
Start: 2019-10-22 | End: 2019-10-22

## 2019-10-22 RX ORDER — ACETAMINOPHEN 500 MG
1000 TABLET ORAL ONCE
Refills: 0 | Status: DISCONTINUED | OUTPATIENT
Start: 2019-10-22 | End: 2019-10-22

## 2019-10-22 RX ORDER — LIDOCAINE 4 G/100G
1 CREAM TOPICAL DAILY
Refills: 0 | Status: DISCONTINUED | OUTPATIENT
Start: 2019-10-22 | End: 2019-10-23

## 2019-10-22 RX ADMIN — HYDROMORPHONE HYDROCHLORIDE 30 MILLILITER(S): 2 INJECTION INTRAMUSCULAR; INTRAVENOUS; SUBCUTANEOUS at 07:44

## 2019-10-22 RX ADMIN — HYDROMORPHONE HYDROCHLORIDE 30 MILLILITER(S): 2 INJECTION INTRAMUSCULAR; INTRAVENOUS; SUBCUTANEOUS at 00:46

## 2019-10-22 RX ADMIN — Medication 1: at 09:40

## 2019-10-22 RX ADMIN — Medication 1000 MILLIGRAM(S): at 02:15

## 2019-10-22 RX ADMIN — SODIUM CHLORIDE 30 MILLILITER(S): 9 INJECTION INTRAMUSCULAR; INTRAVENOUS; SUBCUTANEOUS at 09:51

## 2019-10-22 RX ADMIN — LIDOCAINE 1 PATCH: 4 CREAM TOPICAL at 19:00

## 2019-10-22 RX ADMIN — Medication 400 MILLIGRAM(S): at 09:30

## 2019-10-22 RX ADMIN — Medication 400 MILLIGRAM(S): at 17:30

## 2019-10-22 RX ADMIN — PANTOPRAZOLE SODIUM 40 MILLIGRAM(S): 20 TABLET, DELAYED RELEASE ORAL at 12:49

## 2019-10-22 RX ADMIN — SODIUM CHLORIDE 1000 MILLILITER(S): 9 INJECTION INTRAMUSCULAR; INTRAVENOUS; SUBCUTANEOUS at 17:30

## 2019-10-22 RX ADMIN — HYDROMORPHONE HYDROCHLORIDE 30 MILLILITER(S): 2 INJECTION INTRAMUSCULAR; INTRAVENOUS; SUBCUTANEOUS at 19:46

## 2019-10-22 RX ADMIN — SODIUM CHLORIDE 30 MILLILITER(S): 9 INJECTION INTRAMUSCULAR; INTRAVENOUS; SUBCUTANEOUS at 19:45

## 2019-10-22 RX ADMIN — HEPARIN SODIUM 5000 UNIT(S): 5000 INJECTION INTRAVENOUS; SUBCUTANEOUS at 14:29

## 2019-10-22 RX ADMIN — Medication 1000 MILLIGRAM(S): at 09:45

## 2019-10-22 RX ADMIN — LIDOCAINE 1 PATCH: 4 CREAM TOPICAL at 12:49

## 2019-10-22 RX ADMIN — Medication 400 MILLIGRAM(S): at 02:00

## 2019-10-22 RX ADMIN — ONDANSETRON 4 MILLIGRAM(S): 8 TABLET, FILM COATED ORAL at 03:23

## 2019-10-22 RX ADMIN — Medication 100 MILLIGRAM(S): at 14:29

## 2019-10-22 RX ADMIN — Medication 1000 MILLIGRAM(S): at 17:45

## 2019-10-22 RX ADMIN — HEPARIN SODIUM 5000 UNIT(S): 5000 INJECTION INTRAVENOUS; SUBCUTANEOUS at 06:22

## 2019-10-22 RX ADMIN — GABAPENTIN 100 MILLIGRAM(S): 400 CAPSULE ORAL at 14:28

## 2019-10-22 RX ADMIN — HEPARIN SODIUM 5000 UNIT(S): 5000 INJECTION INTRAVENOUS; SUBCUTANEOUS at 23:03

## 2019-10-22 NOTE — PHYSICAL EXAM
[General Appearance - Alert] : alert [General Appearance - In No Acute Distress] : in no acute distress [General Appearance - Well Nourished] : well nourished [Outer Ear] : the ears and nose were normal in appearance [General Appearance - Well Developed] : well developed [Sclera] : the sclera and conjunctiva were normal [Neck Appearance] : the appearance of the neck was normal [Hearing Threshold Finger Rub Not [place]] : hearing was normal [] : the neck was supple [Neck Cervical Mass (___cm)] : no neck mass was observed [Respiration, Rhythm And Depth] : normal respiratory rhythm and effort [Auscultation Breath Sounds / Voice Sounds] : lungs were clear to auscultation bilaterally [Examination Of The Chest] : the chest was normal in appearance [Abdomen Tenderness] : non-tender [Abdomen Soft] : soft [Cervical Lymph Nodes Enlarged Posterior Bilaterally] : posterior cervical [Cervical Lymph Nodes Enlarged Anterior Bilaterally] : anterior cervical [Supraclavicular Lymph Nodes Enlarged Bilaterally] : supraclavicular [No CVA Tenderness] : no ~M costovertebral angle tenderness [Skin Color & Pigmentation] : normal skin color and pigmentation [Abnormal Walk] : normal gait [Oriented To Time, Place, And Person] : oriented to person, place, and time [No Focal Deficits] : no focal deficits [Impaired Insight] : insight and judgment were intact [Affect] : the affect was normal [FreeTextEntry1] : deferred

## 2019-10-22 NOTE — ASSESSMENT
[FreeTextEntry1] : 79 year old male presenting for initial consultation.  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 Gibbons for evaluation of a lung nodule.\par \par PET/CT done on 8/29/19 revealed:\par - No definite abnormal asymmetrically increased FDG activity in the region of the left true vocal cord. \par - Small left submental lymph node, 1.0 x 0.5 cm (image 76 of the head and neck series), SUV 1.8. No additional enlarged or FDG avid cervical lymph node. \par - Small discrete FDG avid foci in the bilateral mandibular molar regions, SUV 6.8 (image 62 of the head and neck series) on the right, likely dental in etiology. \par - LUNGS: FDG-avid left upper lobe pulmonary nodule, 1.3 x 1.2 cm (image 78), SUV 8.1. Additional tiny opacity specifically in the right upper lobe (images 75, 76), too small to characterize. Non-FDG avid linear opacity in the posterior left lower lobe, likely atelectasis. \par - PLEURA/PERICARDIUM: No abnormal FDG activity. No pleural or pericardial effusion. \par - KIDNEYS/URINARY BLADDER: Excreted FDG activity. Postsurgical changes of left nephrectomy and partial right nephrectomy. \par - BOWEL/PERITONEUM/MESENTERY: Nonspecific focal FDG avidity in the anorectal region, SUV 10.5 (image 243). \par - PELVIC ORGANS: No abnormal FDG activity. Large left hydrocele. \par - FDG avid focus in the soft tissues/muscles adjacent to the posterior aspect of the proximal right femur, likely enthesopathy.  \par \par I have reviewed the patient's medical records and diagnostic images during the time of this office visit.  This patient was discussed with both Dr. Loaiza and Dr. Gibbons whom are both in agreement with the plan.  \par Plan:\par 1.  Schedule for bronchoscopy, Left VATS, lung resection.  I have reviewed the patient's medical records and diagnostic images during the time of this office visit, and I have made the following recommendation:\par 2.  Prior to surgery he will require cardiac clearance and complete PFT's.\par 3.  He was instructed to stop Xarelto 3 days prior to surgery.\par \par Written by Rachael Fraser NP, acting as a scribe for JOMAR JAIMES MD.\par \par The documentation recorded by the scribe accurately reflects the service I personally performed and the decisions made by me. JOMAR JAIMES MD\par

## 2019-10-22 NOTE — HISTORY OF PRESENT ILLNESS
[FreeTextEntry1] : Mr. LAYLA GALVAN is a 79 year  old male presenting for initial consultation.  He was referred by Dr. Gibbons for evaluation of a lung nodule.\par \par He reports history of hoarseness x several months.  He saw Dr. Paz and a vocal cord lesion was discovered and he was referred to see Dr. Gibbons.  Dr. Gibbons performed  laryngoscopy with biopsy of left anterior vocal cord which revealed squamous cell carcinoma.  He saw Radiation oncologist Dr. Loaiza and was scheduled to start radiation therapy this afternoon.  \par \par PET/CT done on 8/29/19 revealed:\par - No definite abnormal asymmetrically increased FDG activity in the region of the left true vocal cord. \par - Small left submental lymph node, 1.0 x 0.5 cm (image 76 of the head and neck series), SUV 1.8. No additional enlarged or FDG avid cervical lymph node. \par - Small discrete FDG avid foci in the bilateral mandibular molar regions, SUV 6.8 (image 62 of the head and neck series) on the right, likely dental in etiology. \par - LUNGS: FDG-avid left upper lobe pulmonary nodule, 1.3 x 1.2 cm (image 78), SUV 8.1. Additional tiny opacity specifically in the right upper lobe (images 75, 76), too small to characterize. Non-FDG avid linear opacity in the posterior left lower lobe, likely atelectasis. \par - PLEURA/PERICARDIUM: No abnormal FDG activity. No pleural or pericardial effusion. \par - KIDNEYS/URINARY BLADDER: Excreted FDG activity. Postsurgical changes of left nephrectomy and partial right nephrectomy. \par - BOWEL/PERITONEUM/MESENTERY: Nonspecific focal FDG avidity in the anorectal region, SUV 10.5 (image 243). \par - PELVIC ORGANS: No abnormal FDG activity. Large left hydrocele. \par - FDG avid focus in the soft tissues/muscles adjacent to the posterior aspect of the proximal right femur, likely enthesopathy.  \par \par He reports that he feels well, but admits to shortness of breath with exertion and a cough which is productive of clear sputum.  He denies any fatigue, fever, chills, chest pain, hemoptysis, or recent illness.  \par

## 2019-10-22 NOTE — CONSULT LETTER
[Courtesy Letter:] : I had the pleasure of seeing your patient, [unfilled], in my office today. [Dear  ___] : Dear  [unfilled], [Please see my note below.] : Please see my note below. [Sincerely,] : Sincerely, [FreeTextEntry2] : Dr. Ron Gibbons(KAYLEE/Ref)\par Dr. Oliver Jean (PCP)\par Dr. Olson (Card)\par Dr. Loaiza (RadOnc) [FreeTextEntry3] : \par \par \par Mauro Parikh MD, FACS \par Chief, Division of Thoracic Surgery \par Director, Minimally Invasive Thoracic Surgery \par Department of Cardiovascular and Thoracic Surgery \par [facility] \par , Cardiovascular and Thoracic Surgery

## 2019-10-22 NOTE — REVIEW OF SYSTEMS
[Recent Weight Loss (___ Lbs)] : recent [unfilled] ~Ulb weight loss [Cough] : cough [SOB on Exertion] : shortness of breath during exertion [Negative] : Endocrine [Chills] : no chills [Fever] : no fever [Feeling Tired] : not feeling tired [Feeling Poorly] : not feeling poorly [Shortness Of Breath] : no shortness of breath [Wheezing] : no wheezing [FreeTextEntry2] : 10 lb weight loss since teeth extracted [FreeTextEntry4] : (Note: recently had multiple dental extractions done prior to beginning radiation treatment) [FreeTextEntry5] : hx of Afib on Xarelto [FreeTextEntry8] : s/p left nephrectomy, 1998 for renal cancer,  [de-identified] : + Type 2 DM

## 2019-10-23 ENCOUNTER — TRANSCRIPTION ENCOUNTER (OUTPATIENT)
Age: 79
End: 2019-10-23

## 2019-10-23 VITALS
DIASTOLIC BLOOD PRESSURE: 79 MMHG | SYSTOLIC BLOOD PRESSURE: 128 MMHG | RESPIRATION RATE: 19 BRPM | HEART RATE: 89 BPM | OXYGEN SATURATION: 92 %

## 2019-10-23 LAB
ANION GAP SERPL CALC-SCNC: 13 MMO/L — SIGNIFICANT CHANGE UP (ref 7–14)
APTT BLD: 31.4 SEC — SIGNIFICANT CHANGE UP (ref 27.5–36.3)
BUN SERPL-MCNC: 18 MG/DL — SIGNIFICANT CHANGE UP (ref 7–23)
CALCIUM SERPL-MCNC: 8.9 MG/DL — SIGNIFICANT CHANGE UP (ref 8.4–10.5)
CHLORIDE SERPL-SCNC: 99 MMOL/L — SIGNIFICANT CHANGE UP (ref 98–107)
CO2 SERPL-SCNC: 25 MMOL/L — SIGNIFICANT CHANGE UP (ref 22–31)
CREAT SERPL-MCNC: 1.39 MG/DL — HIGH (ref 0.5–1.3)
GLUCOSE BLDC GLUCOMTR-MCNC: 118 MG/DL — HIGH (ref 70–99)
GLUCOSE BLDC GLUCOMTR-MCNC: 124 MG/DL — HIGH (ref 70–99)
GLUCOSE BLDC GLUCOMTR-MCNC: 131 MG/DL — HIGH (ref 70–99)
GLUCOSE SERPL-MCNC: 123 MG/DL — HIGH (ref 70–99)
HCT VFR BLD CALC: 45.5 % — SIGNIFICANT CHANGE UP (ref 39–50)
HGB BLD-MCNC: 14.7 G/DL — SIGNIFICANT CHANGE UP (ref 13–17)
INR BLD: 1.16 — SIGNIFICANT CHANGE UP (ref 0.88–1.17)
MAGNESIUM SERPL-MCNC: 2 MG/DL — SIGNIFICANT CHANGE UP (ref 1.6–2.6)
MCHC RBC-ENTMCNC: 29.6 PG — SIGNIFICANT CHANGE UP (ref 27–34)
MCHC RBC-ENTMCNC: 32.3 % — SIGNIFICANT CHANGE UP (ref 32–36)
MCV RBC AUTO: 91.5 FL — SIGNIFICANT CHANGE UP (ref 80–100)
NRBC # FLD: 0 K/UL — SIGNIFICANT CHANGE UP (ref 0–0)
PLATELET # BLD AUTO: 185 K/UL — SIGNIFICANT CHANGE UP (ref 150–400)
PMV BLD: 10.3 FL — SIGNIFICANT CHANGE UP (ref 7–13)
POTASSIUM SERPL-MCNC: 4.3 MMOL/L — SIGNIFICANT CHANGE UP (ref 3.5–5.3)
POTASSIUM SERPL-SCNC: 4.3 MMOL/L — SIGNIFICANT CHANGE UP (ref 3.5–5.3)
PROTHROM AB SERPL-ACNC: 12.9 SEC — SIGNIFICANT CHANGE UP (ref 9.8–13.1)
RBC # BLD: 4.97 M/UL — SIGNIFICANT CHANGE UP (ref 4.2–5.8)
RBC # FLD: 13.8 % — SIGNIFICANT CHANGE UP (ref 10.3–14.5)
SODIUM SERPL-SCNC: 137 MMOL/L — SIGNIFICANT CHANGE UP (ref 135–145)
WBC # BLD: 8.92 K/UL — SIGNIFICANT CHANGE UP (ref 3.8–10.5)
WBC # FLD AUTO: 8.92 K/UL — SIGNIFICANT CHANGE UP (ref 3.8–10.5)

## 2019-10-23 PROCEDURE — 99233 SBSQ HOSP IP/OBS HIGH 50: CPT

## 2019-10-23 PROCEDURE — 71045 X-RAY EXAM CHEST 1 VIEW: CPT | Mod: 26,76

## 2019-10-23 PROCEDURE — 99238 HOSP IP/OBS DSCHRG MGMT 30/<: CPT

## 2019-10-23 RX ORDER — OXYCODONE HYDROCHLORIDE 5 MG/1
10 TABLET ORAL
Refills: 0 | Status: DISCONTINUED | OUTPATIENT
Start: 2019-10-23 | End: 2019-10-23

## 2019-10-23 RX ORDER — ACETAMINOPHEN 500 MG
975 TABLET ORAL EVERY 6 HOURS
Refills: 0 | Status: DISCONTINUED | OUTPATIENT
Start: 2019-10-23 | End: 2019-10-23

## 2019-10-23 RX ORDER — OXYCODONE HYDROCHLORIDE 5 MG/1
5 TABLET ORAL
Refills: 0 | Status: DISCONTINUED | OUTPATIENT
Start: 2019-10-23 | End: 2019-10-23

## 2019-10-23 RX ORDER — ACETAMINOPHEN 500 MG
1000 TABLET ORAL ONCE
Refills: 0 | Status: COMPLETED | OUTPATIENT
Start: 2019-10-23 | End: 2019-10-23

## 2019-10-23 RX ORDER — OXYCODONE HYDROCHLORIDE 5 MG/1
1 TABLET ORAL
Qty: 35 | Refills: 0
Start: 2019-10-23

## 2019-10-23 RX ADMIN — OXYCODONE HYDROCHLORIDE 5 MILLIGRAM(S): 5 TABLET ORAL at 17:00

## 2019-10-23 RX ADMIN — Medication 400 MILLIGRAM(S): at 07:00

## 2019-10-23 RX ADMIN — Medication 100 MILLIGRAM(S): at 13:06

## 2019-10-23 RX ADMIN — LIDOCAINE 1 PATCH: 4 CREAM TOPICAL at 00:00

## 2019-10-23 RX ADMIN — HEPARIN SODIUM 5000 UNIT(S): 5000 INJECTION INTRAVENOUS; SUBCUTANEOUS at 06:53

## 2019-10-23 RX ADMIN — Medication 975 MILLIGRAM(S): at 13:15

## 2019-10-23 RX ADMIN — LIDOCAINE 1 PATCH: 4 CREAM TOPICAL at 13:07

## 2019-10-23 RX ADMIN — SODIUM CHLORIDE 30 MILLILITER(S): 9 INJECTION INTRAMUSCULAR; INTRAVENOUS; SUBCUTANEOUS at 07:32

## 2019-10-23 RX ADMIN — HEPARIN SODIUM 5000 UNIT(S): 5000 INJECTION INTRAVENOUS; SUBCUTANEOUS at 13:07

## 2019-10-23 RX ADMIN — Medication 100 MILLIGRAM(S): at 06:53

## 2019-10-23 RX ADMIN — OXYCODONE HYDROCHLORIDE 5 MILLIGRAM(S): 5 TABLET ORAL at 17:30

## 2019-10-23 RX ADMIN — Medication 975 MILLIGRAM(S): at 13:00

## 2019-10-23 RX ADMIN — Medication 1000 MILLIGRAM(S): at 07:15

## 2019-10-23 RX ADMIN — PANTOPRAZOLE SODIUM 40 MILLIGRAM(S): 20 TABLET, DELAYED RELEASE ORAL at 07:32

## 2019-10-23 RX ADMIN — GABAPENTIN 100 MILLIGRAM(S): 400 CAPSULE ORAL at 06:53

## 2019-10-23 NOTE — DISCHARGE NOTE NURSING/CASE MANAGEMENT/SOCIAL WORK - NSDCPNINST_GEN_ALL_CORE
Take medications as prescribed. Follow up with your doctor as directed. Shower daily with mild soap & water, pat sites dry. No wash cloth on incisions. No cream, lotion or oils on wounds. Increase activity slowly, as tolerated. Continue to use your spirometer and perform  your deep breathing & coughing exercises. No driving while on pain medication, no heavy lifting . Call your doctor for fever over 101 degrees, pain not relieved by pain medication or for any questions or concerns you may have. Remember to have a chest  x ray before you see the doctor & bring a copy of films with you to your appointment.  Please call 911 for chest pain, shortness of breath or for signs & symptoms of stroke.

## 2019-10-23 NOTE — SBIRT NOTE ADULT - NSSBIRTALCPOSREINDET_GEN_A_CORE
Pt education performed on effects of drinking. Pt educated not to drink alcohol while taking oxycodone prescription for postop pain.

## 2019-10-23 NOTE — PROGRESS NOTE ADULT - PROVIDER SPECIALTY LIST ADULT
Anesthesia
Critical Care
Pain Medicine
Critical Care
Critical Care

## 2019-10-23 NOTE — DIETITIAN INITIAL EVALUATION ADULT. - OTHER INFO
78 y/o male with h/o Laryngeal Ca now s/p lobectomy upon abnormal findings on examination of his lung. Spoke with pt who said that he has followed a soft consistency diet ever since he was diagnosed with laryngeal Ca. He is currently tolerating his Mechanical Soft consistency diet with fair/good oral intake. He denies food allergies, nausea/vomiting/diarrhea/constipation at this time. He says that his weight has been stable at 200 lbs PTA. No evidence of edema presently. Offered nutrition supplementation with Glucerna, however he declined. Food preferences taken and to be honored within the guidelines of therapeutic diet ordered.

## 2019-10-23 NOTE — DIETITIAN INITIAL EVALUATION ADULT. - PERTINENT LABORATORY DATA
10-23 Na 137 mmol/L Glu 123 mg/dL<H> K+ 4.3 mmol/L Cr 1.39 mg/dL<H> BUN 18 mg/dL Phos n/a    10-22-19 HbA1c 6.4 %  10-23 @ 11:43 POCT 131 mg/dL  10-23 @ 07:27 POCT 118 mg/dL  10-22 @ 23:01 POCT 120 mg/dL  10-22 @ 16:42 POCT 147 mg/dL

## 2019-10-23 NOTE — DISCHARGE NOTE PROVIDER - NSDCFUADDINST_GEN_ALL_CORE_FT
Please have your follow up chest x-ray done prior to following up with Dr. Parikh. Please bring a copy of your results to your follow up appointment as well as the images on a disc.

## 2019-10-23 NOTE — DISCHARGE NOTE PROVIDER - NSDCCPCAREPLAN_GEN_ALL_CORE_FT
PRINCIPAL DISCHARGE DIAGNOSIS  Diagnosis: Other nonspecific abnormal finding of lung field  Assessment and Plan of Treatment: Please follow up with Dr. Parikh in the office in 2 weeks. Please call and make an appointment. Resume a regular diet. Please remain active and ambulatory but refrain from any heavy lifting. You may shower starting 10/25/19.      SECONDARY DISCHARGE DIAGNOSES  Diagnosis: HTN (hypertension)  Assessment and Plan of Treatment: Please continue the current treatment regimen as directed by your primary care provider.    Diagnosis: Type 2 diabetes mellitus  Assessment and Plan of Treatment: Please continue the current treatment regimen as directed by your primary care provider.    Diagnosis: Atrial fibrillation  Assessment and Plan of Treatment: Please continue the current treatment regimen including the Xarelto as directed by your primary care provider.    Diagnosis: Other nonspecific abnormal finding of lung field  Assessment and Plan of Treatment:

## 2019-10-23 NOTE — DISCHARGE NOTE NURSING/CASE MANAGEMENT/SOCIAL WORK - PATIENT PORTAL LINK FT
You can access the FollowMyHealth Patient Portal offered by [facility] by registering at the following website: [email or web address]. By joining [facility] FollowMyHealth portal, you will also be able to view your health information using other applications (apps) compatible with our system.

## 2019-10-23 NOTE — DISCHARGE NOTE PROVIDER - CARE PROVIDER_API CALL
Mauro Parikh)  Surgery; Thoracic Surgery  [address] [place] [address], [place] [address]  Phone: [phone]  Fax: [phone]  Follow Up Time: 2 weeks

## 2019-10-23 NOTE — DIETITIAN INITIAL EVALUATION ADULT. - PERTINENT MEDS FT
MEDICATIONS  (STANDING):  acetaminophen   Tablet .. 975 milliGRAM(s) Oral every 6 hours  dextrose 50% Injectable 25 Gram(s) IV Push once  docusate sodium 100 milliGRAM(s) Oral three times a day  gabapentin 100 milliGRAM(s) Oral every 12 hours  heparin  Injectable 5000 Unit(s) SubCutaneous every 8 hours  HYDROmorphone PCA (1 mG/mL) 30 milliLiter(s) PCA Continuous PCA Continuous  influenza   Vaccine 0.5 milliLiter(s) IntraMuscular once  insulin lispro (HumaLOG) corrective regimen sliding scale   SubCutaneous three times a day before meals  insulin lispro (HumaLOG) corrective regimen sliding scale   SubCutaneous at bedtime  lidocaine   Patch 1 Patch Transdermal daily  metoprolol succinate  milliGRAM(s) Oral daily  pantoprazole    Tablet 40 milliGRAM(s) Oral before breakfast  senna 2 Tablet(s) Oral at bedtime  sitaGLIPtin 50 milliGRAM(s) Oral at bedtime  sodium chloride 0.9%. 1000 milliLiter(s) (30 mL/Hr) IV Continuous <Continuous>

## 2019-10-23 NOTE — PROGRESS NOTE ADULT - SUBJECTIVE AND OBJECTIVE BOX
LAYLA GALVAN  MRN-[id number]    Patient is a 79y old  Male who presents with a chief complaint of Lobectomy (21 Oct 2019 22:38)    HPI:  80y/o male with history of atrial fibrillation on Xarelto, hypertension, diabetes, thoracic aortic aneurysm ( w/o rupture), laryngeal cancer was found to have a lung nodule and is s/p flexible bronchoscopy, left video assisted thoracoscopy and left upper lobectomy. 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 He has no complaints.     PAST MEDICAL & SURGICAL HISTORY:  Other nonspecific abnormal finding of lung field  Type 2 diabetes mellitus  Other diseases of vocal cords  Atrial fibrillation  Obesity  GERD (gastroesophageal reflux disease)  Gout  Reflux  Renal cancer  Hypertension  H/O: knee surgery  S/P Nephrectomy: left, partial right    FAMILY HISTORY:  Family hx of prostate cancer  FH: type 2 diabetes mellitus    Social History:  Denies illicit drug use or frequent alcohol consumption. Endorses prior smoking.     Allergies  codeine (Other)    MEDICATIONS  (STANDING):  acetaminophen  IVPB .. 1000 milliGRAM(s) IV Intermittent once  dextrose 5%. 1000 milliLiter(s) (50 mL/Hr) IV Continuous <Continuous>  dextrose 50% Injectable 12.5 Gram(s) IV Push once  dextrose 50% Injectable 25 Gram(s) IV Push once  dextrose 50% Injectable 25 Gram(s) IV Push once  docusate sodium 100 milliGRAM(s) Oral three times a day  heparin  Injectable 5000 Unit(s) SubCutaneous every 8 hours  HYDROmorphone PCA (1 mG/mL) 30 milliLiter(s) PCA Continuous PCA Continuous  insulin lispro (HumaLOG) corrective regimen sliding scale   SubCutaneous three times a day before meals  insulin lispro (HumaLOG) corrective regimen sliding scale   SubCutaneous at bedtime  metoprolol succinate  milliGRAM(s) Oral daily  pantoprazole    Tablet 40 milliGRAM(s) Oral before breakfast  senna 2 Tablet(s) Oral at bedtime  sodium chloride 0.9%. 1000 milliLiter(s) (30 mL/Hr) IV Continuous <Continuous>    MEDICATIONS  (PRN):  dextrose 40% Gel 15 Gram(s) Oral once PRN Blood Glucose LESS THAN 70 milliGRAM(s)/deciliter  glucagon  Injectable 1 milliGRAM(s) IntraMuscular once PRN Glucose LESS THAN 70 milligrams/deciliter  HYDROmorphone PCA (1 mG/mL) Rescue Clinician Bolus 0.5 milliGRAM(s) IV Push every 15 minutes PRN for Pain Scale GREATER THAN 6  naloxone Injectable 0.1 milliGRAM(s) IV Push every 3 minutes PRN For ANY of the following changes in patient status:  A. RR LESS THAN 10 breaths per minute, B. Oxygen saturation LESS THAN 90%, C. Sedation score of 6  ondansetron Injectable 4 milliGRAM(s) IV Push every 6 hours PRN Nausea    Review of Systems:  Constitutional:  Negative for weight change, fever, malaise  HEENT:  Negative for sinus pain, hoarseness, sore throat, dysphagia, vision changes  Cardiovascular:  Negative for chest pain, palpitations, dizziness  Respiratory:  Negative for cough, wheezing, dyspnea  Gastrointestinal:  Negative for nausea, vomiting, diarrhea, melena  Musculoskeletal:  Negative for pain, swelling, stiffness   Neuro:  Negative for weakness, numbness, headache  Psych:  Negative for anxiety, depression  Endocrine:  Negative for polyuria, polydipsia, temperature Intolerance    All other systems negative unless otherwise stated    ICU Vital Signs Last 24 Hrs  T(C): 36.4 (22 Oct 2019 04:00), Max: 36.8 (21 Oct 2019 13:35)  T(F): 97.6 (22 Oct 2019 04:00), Max: 98.2 (21 Oct 2019 13:35)  HR: 81 (22 Oct 2019 05:00) (69 - 93)  BP: 108/60 (22 Oct 2019 05:00) (108/60 - 145/88)  BP(mean): 70 (22 Oct 2019 05:00) (70 - 102)  ABP: --  ABP(mean): --  RR: 11 (22 Oct 2019 05:00) (11 - 20)  SpO2: 98% (22 Oct 2019 05:00) (90% - 100%)    Daily Height in cm: 172.72 (21 Oct 2019 13:35)    Daily   I&O's Summary    Physical Exam:  Gen: Alert, no apparent distress  CV: Regular rate and rhythm no murmurs, rubs or gallops  Pulm: Clear to auscultation bilaterally, no rales, rhonchi or wheezes  Chest: Chest tubes/drains in place with dressings clean, dry and intact  GI: Abd is soft, non-tender and non-distended with +BS  Ext: No clubbing, cyanosis or edema  Neuro: A+Ox3, follows commands and moves all extremities    Labs:                          14.5   13.11 )-----------( 188      ( 22 Oct 2019 04:00 )             45.0       10-22    137  |  100  |  17  ----------------------------<  184<H>  4.8   |  22  |  1.46<H>    Ca    8.7      22 Oct 2019 04:00    Assessment/Plan: 80y/o male with history of atrial fibrillation on Xarelto, hypertension, diabetes, thoracic aortic aneurysm ( w/o rupture), laryngeal cancer was found to have a lung nodule and is s/p flexible bronchoscopy, left video assisted thoracoscopy and left upper lobectomy.    Neuro:   Pain control with PCA / Tylenol IV                                           Cardiovascular:    Stable hemodynamics  Not on any pressors  Continue Toprol  Continue hemodynamic monitoring    Respiratory:  Pt is comfortable on nasal cannula  Encourage incentive spirometry  Monitor chest tube output  Chest tube to suction after lung appeared down on water seal; still with air leak    GI:  Clears diet  Continue bowel regimen, Protonix  Continue Zofran for nausea - PRN	          Renal:  Monitor I/Os and electrolytes    Hematologic / Oncology:  No signs of active bleeding  Monitor chest tube output    HSQ for DVT ppl    Infectious disease:  All surgical incision / chest tube sites look clean  No fever or other signs of infection     Endocrine:  Continue Accuchecks with coverage    All clinical, lab, hemodynamic and radiographic data were reviewed and the plan was discussed with CTICU team.     Kelvin Mcadams MD
LAYLA GALVAN  MRN-[id number]  _________________________  VITAL SIGNS:  Vital Signs Last 24 Hrs  T(C): 36.9 (23 Oct 2019 04:00), Max: 37 (22 Oct 2019 08:00)  T(F): 98.5 (23 Oct 2019 04:00), Max: 98.6 (22 Oct 2019 08:00)  HR: 92 (23 Oct 2019 06:00) (76 - 93)  BP: 130/73 (23 Oct 2019 04:00) (96/68 - 136/110)  BP(mean): 88 (23 Oct 2019 04:00) (70 - 117)  RR: 15 (23 Oct 2019 06:00) (10 - 24)  SpO2: 97% (23 Oct 2019 06:00) (91% - 100%)  I/Os:   I&O's Detail    21 Oct 2019 07:01  -  22 Oct 2019 07:00  --------------------------------------------------------  IN:    IV PiggyBack: 100 mL    sodium chloride 0.9%.: 270 mL  Total IN: 370 mL    OUT:    Chest Tube: 60 mL    Voided: 300 mL  Total OUT: 360 mL    Total NET: 10 mL      22 Oct 2019 07:01  -  23 Oct 2019 06:57  --------------------------------------------------------  IN:    IV PiggyBack: 450 mL    sodium chloride 0.9%.: 720 mL  Total IN: 1170 mL    OUT:    Chest Tube: 180 mL    Voided: 1350 mL  Total OUT: 1530 mL    Total NET: -360 mL              MEDICATIONS:  MEDICATIONS  (STANDING):  acetaminophen  IVPB .. 1000 milliGRAM(s) IV Intermittent once  dextrose 50% Injectable 25 Gram(s) IV Push once  docusate sodium 100 milliGRAM(s) Oral three times a day  gabapentin 100 milliGRAM(s) Oral every 12 hours  heparin  Injectable 5000 Unit(s) SubCutaneous every 8 hours  HYDROmorphone PCA (1 mG/mL) 30 milliLiter(s) PCA Continuous PCA Continuous  influenza   Vaccine 0.5 milliLiter(s) IntraMuscular once  insulin lispro (HumaLOG) corrective regimen sliding scale   SubCutaneous three times a day before meals  insulin lispro (HumaLOG) corrective regimen sliding scale   SubCutaneous at bedtime  lidocaine   Patch 1 Patch Transdermal daily  metoprolol succinate  milliGRAM(s) Oral daily  pantoprazole    Tablet 40 milliGRAM(s) Oral before breakfast  senna 2 Tablet(s) Oral at bedtime  sodium chloride 0.9%. 1000 milliLiter(s) (30 mL/Hr) IV Continuous <Continuous>    MEDICATIONS  (PRN):  HYDROmorphone PCA (1 mG/mL) Rescue Clinician Bolus 0.5 milliGRAM(s) IV Push every 15 minutes PRN for Pain Scale GREATER THAN 6  naloxone Injectable 0.1 milliGRAM(s) IV Push every 3 minutes PRN For ANY of the following changes in patient status:  A. RR LESS THAN 10 breaths per minute, B. Oxygen saturation LESS THAN 90%, C. Sedation score of 6  ondansetron Injectable 4 milliGRAM(s) IV Push every 6 hours PRN Nausea      LABS:                        14.7   8.92  )-----------( 185      ( 23 Oct 2019 05:00 )             45.5     10-23    137  |  99  |  18  ----------------------------<  123<H>  4.3   |  25  |  1.39<H>    Ca    8.9      23 Oct 2019 05:00  Mg     2.0     10-23        PT/INR - ( 23 Oct 2019 05:00 )   PT: 12.9 SEC;   INR: 1.16          PTT - ( 23 Oct 2019 05:00 )  PTT:31.4 SEC      _________________________      PROCEDURE:  SHELLI Lobectomy (21 Oct 2019 22:38)      ISSUES:   Other nonspecific abnormal finding of lung field  Type 2 diabetes mellitus  Other diseases of vocal cords  Atrial fibrillation  Obesity  GERD (gastroesophageal reflux disease)  Gout  Reflux  Renal cancer  Hypertension  H/O: knee surgery  S/P Nephrectomy: left, partial right    INTERVAL EVENTS:   Chest tube suction decreased to -10. No air leak this AM.    HISTORY:   Patient reports moderate pain at chest wall incision sites which is worse with coughing and deep breathing without associated fever or dyspnea. Pain is improved with use of pain meds.     PHYSICAL EXAM:   Gen: Comfortable, No acute distress  Eyes: Sclera white, Conjunctiva normal, Eyelids normal, Pupils symmetrical   ENT: Mucous membranes moist,  ,  ,    Neck: Trachea midline,  ,  ,  ,    CV: Rate regular, Rhythm regular,    Resp: Breath sounds clear, No accessory muscles use, L chest tube in place,  ,    Abd: Soft, Non-distended, Non-tender, Bowel sounds normal,  ,    Skin: Warm, No peripheral edema of lower extremities,    : No barrientos  Neuro: Moving all 4 extremities  Psych: A&Ox3      ASSESSMENT AND PLAN:     NEURO:  Post-operative Pain - Pain control with PCA and Tylenol IV PRN.            RESPIRATORY:  Hypoxia - Wean nasal cannula for goal O2sat above 92. Obtain CXR. Incentive spirometry. Chest PT and frequent suctioning. Continue bronchodilators. OOB to chair & ambulate w/ assistance. Continuous pulse oximetry for support & to prevent decompensation.    Chest tube – Pleurevac regulated suctioning. Monitor chest tube output.                 CARDIOVASCULAR:  Hemodynamically stable - Not on pressors. Continue hemodynamic monitoring.  HTN - stable. Hold home antihypertensives for now.     Afib - continue rate control. hold anticoagulation for now.      RENAL:  Stable – LR IVF 30mL/hr. Monitor IOs and electrolytes.          GASTROINTESTINAL:  GI prophylaxis with Protonix  Zofran and Reglan IV PRN for nausea  Dysphagia diet              HEMATOLOGIC:  No signs of active bleeding. Monitor Hgb in CBC in AM  DVT prophylaxis with heparin subQ and SCDs.      INFECTIOUS DISEASE:  All surgical sites appear clean. No signs of active infection. Will monitor for fever and leukocytosis.                     ENDOCRINE:  DM2 – Stable. Monitor glucose fingersticks for goal 120-180. Insulin sliding scale. Carb control diet.            Pertinent clinical, laboratory, radiographic, hemodynamic, echocardiographic, respiratory data, microbiologic data and chart were reviewed by myself and analyzed frequently throughout the course of the day and night by myself.    Plan discussed at length with the CTICU staff and Attending CT Surgeon.   Patient's status was discussed with patient at bedside.
ANESTHESIA POSTOP CHECK    79y Male POSTOP DAY 1 S/P Left VATS, SHELLI lobectomy     Vital Signs Last 24 Hrs  T(C): 36.9 (22 Oct 2019 12:00), Max: 37 (22 Oct 2019 08:00)  T(F): 98.4 (22 Oct 2019 12:00), Max: 98.6 (22 Oct 2019 08:00)  HR: 85 (22 Oct 2019 15:00) (75 - 93)  BP: 119/67 (22 Oct 2019 14:00) (96/68 - 145/88)  BP(mean): 81 (22 Oct 2019 14:00) (70 - 102)  RR: 21 (22 Oct 2019 15:00) (10 - 24)  SpO2: 100% (22 Oct 2019 15:00) (90% - 100%)  I&O's Summary    21 Oct 2019 07:01  -  22 Oct 2019 07:00  --------------------------------------------------------  IN: 370 mL / OUT: 360 mL / NET: 10 mL    22 Oct 2019 07:01  -  22 Oct 2019 16:43  --------------------------------------------------------  IN: 340 mL / OUT: 30 mL / NET: 310 mL        [X ] NO APPARENT ANESTHESIA COMPLICATIONS      Comments:
Anesthesia Pain Management Service    SUBJECTIVE: Patient is doing well with IV PCA and no significant problems reported.    Pain Scale Score	At rest: _2__ 	With Activity: ___ 	[X ] Refer to charted pain scores    THERAPY:    [ ] IV PCA Morphine		[ ] 5 mg/mL	[ ] 1 mg/mL  [X ] IV PCA Hydromorphone	[ ] 5 mg/mL	[X ] 1 mg/mL  [ ] IV PCA Fentanyl		[ ] 50 micrograms/mL    Demand dose __0.2_ lockout __6_ (minutes) Continuous Rate _0__ Total: _10.05__  mg used (in past 24 hours)      MEDICATIONS  (STANDING):  acetaminophen   Tablet .. 975 milliGRAM(s) Oral every 6 hours  dextrose 50% Injectable 25 Gram(s) IV Push once  docusate sodium 100 milliGRAM(s) Oral three times a day  gabapentin 100 milliGRAM(s) Oral every 12 hours  heparin  Injectable 5000 Unit(s) SubCutaneous every 8 hours  HYDROmorphone PCA (1 mG/mL) 30 milliLiter(s) PCA Continuous PCA Continuous  influenza   Vaccine 0.5 milliLiter(s) IntraMuscular once  insulin lispro (HumaLOG) corrective regimen sliding scale   SubCutaneous three times a day before meals  insulin lispro (HumaLOG) corrective regimen sliding scale   SubCutaneous at bedtime  lidocaine   Patch 1 Patch Transdermal daily  metoprolol succinate  milliGRAM(s) Oral daily  pantoprazole    Tablet 40 milliGRAM(s) Oral before breakfast  senna 2 Tablet(s) Oral at bedtime  sodium chloride 0.9%. 1000 milliLiter(s) (30 mL/Hr) IV Continuous <Continuous>    MEDICATIONS  (PRN):  HYDROmorphone PCA (1 mG/mL) Rescue Clinician Bolus 0.5 milliGRAM(s) IV Push every 15 minutes PRN for Pain Scale GREATER THAN 6  naloxone Injectable 0.1 milliGRAM(s) IV Push every 3 minutes PRN For ANY of the following changes in patient status:  A. RR LESS THAN 10 breaths per minute, B. Oxygen saturation LESS THAN 90%, C. Sedation score of 6  ondansetron Injectable 4 milliGRAM(s) IV Push every 6 hours PRN Nausea      OBJECTIVE: sitting in chair     Sedation Score:	[ X] Alert	[ ] Drowsy 	[ ] Arousable	[ ] Asleep	[ ] Unresponsive    Side Effects:	[X ] None	[ ] Nausea	[ ] Vomiting	[ ] Pruritus  		[ ] Other:    Vital Signs Last 24 Hrs  T(C): 36.9 (23 Oct 2019 08:00), Max: 36.9 (22 Oct 2019 12:00)  T(F): 98.5 (23 Oct 2019 08:00), Max: 98.5 (23 Oct 2019 04:00)  HR: 80 (23 Oct 2019 11:00) (77 - 93)  BP: 112/67 (23 Oct 2019 11:00) (96/68 - 136/110)  BP(mean): 77 (23 Oct 2019 11:00) (70 - 117)  RR: 12 (23 Oct 2019 11:00) (10 - 24)  SpO2: 95% (23 Oct 2019 11:00) (92% - 100%)    ASSESSMENT/ PLAN    Therapy to  be:	[ X] Continue   [ ] Discontinued   [ ] Change to prn Analgesics    Documentation and Verification of current medications:   [X] Done	[ ] Not done, not elligible    Comments: added tylenol 975mg PO q6hr, CT in place, continue current therapy. Plan to d/c IVPCA tomorrow
Anesthesia Pain Management Service    SUBJECTIVE: Patient is doing well with IV PCA and no significant problems reported.    Pain Scale Score	At rest: _7__ 	With Activity: ___ 	[X ] Refer to charted pain scores    THERAPY:    [ ] IV PCA Morphine		[ ] 5 mg/mL	[ ] 1 mg/mL  [X ] IV PCA Hydromorphone	[ ] 5 mg/mL	[X ] 1 mg/mL  [ ] IV PCA Fentanyl		[ ] 50 micrograms/mL    Demand dose __0.15_ lockout __6_ (minutes) Continuous Rate _0__ Total: 4.5__  mg used (in past 24 hours)      MEDICATIONS  (STANDING):  dextrose 50% Injectable 25 Gram(s) IV Push once  docusate sodium 100 milliGRAM(s) Oral three times a day  gabapentin 100 milliGRAM(s) Oral every 12 hours  heparin  Injectable 5000 Unit(s) SubCutaneous every 8 hours  HYDROmorphone PCA (1 mG/mL) 30 milliLiter(s) PCA Continuous PCA Continuous  insulin lispro (HumaLOG) corrective regimen sliding scale   SubCutaneous three times a day before meals  insulin lispro (HumaLOG) corrective regimen sliding scale   SubCutaneous at bedtime  lidocaine   Patch 1 Patch Transdermal daily  metoprolol succinate  milliGRAM(s) Oral daily  pantoprazole    Tablet 40 milliGRAM(s) Oral before breakfast  senna 2 Tablet(s) Oral at bedtime  sodium chloride 0.9%. 1000 milliLiter(s) (30 mL/Hr) IV Continuous <Continuous>    MEDICATIONS  (PRN):  acetaminophen  IVPB .. 1000 milliGRAM(s) IV Intermittent every 6 hours PRN Moderate Pain (4 - 6)  HYDROmorphone PCA (1 mG/mL) Rescue Clinician Bolus 0.5 milliGRAM(s) IV Push every 15 minutes PRN for Pain Scale GREATER THAN 6  naloxone Injectable 0.1 milliGRAM(s) IV Push every 3 minutes PRN For ANY of the following changes in patient status:  A. RR LESS THAN 10 breaths per minute, B. Oxygen saturation LESS THAN 90%, C. Sedation score of 6  ondansetron Injectable 4 milliGRAM(s) IV Push every 6 hours PRN Nausea      OBJECTIVE: sitting in chair     Sedation Score:	[ X] Alert	[ ] Drowsy 	[ ] Arousable	[ ] Asleep	[ ] Unresponsive    Side Effects:	[ ] None	[ ] Nausea	[ ] Vomiting	[ ] Pruritus  		X ] Other: Light handedness     Vital Signs Last 24 Hrs  T(C): 37 (22 Oct 2019 08:00), Max: 37 (22 Oct 2019 08:00)  T(F): 98.6 (22 Oct 2019 08:00), Max: 98.6 (22 Oct 2019 08:00)  HR: 83 (22 Oct 2019 11:00) (69 - 93)  BP: 125/71 (22 Oct 2019 11:00) (108/60 - 145/88)  BP(mean): 84 (22 Oct 2019 11:00) (70 - 102)  RR: 19 (22 Oct 2019 11:00) (10 - 20)  SpO2: 100% (22 Oct 2019 11:00) (90% - 100%)    ASSESSMENT/ PLAN    Therapy to  be:	[ X] Continue   [ ] Discontinued   [ ] Change to prn Analgesics    Documentation and Verification of current medications:   [X] Done	[ ] Not done, not elligible    Comments: adding gabapentin 100mg BID, continue current therapy
Anesthesia Pain Management Service    SUBJECTIVE: Pt doing well with IV PCA without problems reported but minimal pain now that CT removed.    Therapy:	  [ X] IV PCA	   [ ] Epidural           [ ] s/p Spinal Opoid              [ ] Postpartum infusion	  [ ] Patient controlled regional anesthesia (PCRA)    [ ] prn Analgesics    Allergies    codeine (Other)    Intolerances      MEDICATIONS  (STANDING):  acetaminophen   Tablet .. 975 milliGRAM(s) Oral every 6 hours  dextrose 50% Injectable 25 Gram(s) IV Push once  docusate sodium 100 milliGRAM(s) Oral three times a day  gabapentin 100 milliGRAM(s) Oral every 12 hours  heparin  Injectable 5000 Unit(s) SubCutaneous every 8 hours  influenza   Vaccine 0.5 milliLiter(s) IntraMuscular once  insulin lispro (HumaLOG) corrective regimen sliding scale   SubCutaneous three times a day before meals  insulin lispro (HumaLOG) corrective regimen sliding scale   SubCutaneous at bedtime  lidocaine   Patch 1 Patch Transdermal daily  metoprolol succinate  milliGRAM(s) Oral daily  pantoprazole    Tablet 40 milliGRAM(s) Oral before breakfast  senna 2 Tablet(s) Oral at bedtime  sitaGLIPtin 50 milliGRAM(s) Oral at bedtime  sodium chloride 0.9%. 1000 milliLiter(s) (30 mL/Hr) IV Continuous <Continuous>    MEDICATIONS  (PRN):  oxyCODONE    IR 5 milliGRAM(s) Oral every 3 hours PRN Moderate Pain (4 - 6)  oxyCODONE    IR 10 milliGRAM(s) Oral every 3 hours PRN Severe Pain (7 - 10)      OBJECTIVE:   [X] No new signs     [ ] Other:    Side Effects:  [X ] None			[ ] Other:    Assessment of Catheter Site:		[ ] Intact		[ ] Other:    ASSESSMENT/PLAN  [ ] Continue current therapy. Recommend non-opioid adjuvant analgesics to be used when possible and when allowed by primary surgical team.    [X ] Therapy changed to:    [ ] IV PCA       [ ] Epidural     [X ] prn Analgesics     Comments: As discussed with CT ICU attending, IV PCA stopped and oral opioid ordered PRN and non-opioid adjuvants to be continued.    Progress Note written now but Patient was seen earlier.
Anesthesia Pain Management Service    SUBJECTIVE: Pt doing well with IV PCA without problems reported.    Therapy:	  [ X] IV PCA	   [ ] Epidural           [ ] s/p Spinal Opoid              [ ] Postpartum infusion	  [ ] Patient controlled regional anesthesia (PCRA)    [ ] prn Analgesics    Allergies    codeine (Other)    Intolerances      MEDICATIONS  (STANDING):  dextrose 50% Injectable 25 Gram(s) IV Push once  docusate sodium 100 milliGRAM(s) Oral three times a day  gabapentin 100 milliGRAM(s) Oral every 12 hours  heparin  Injectable 5000 Unit(s) SubCutaneous every 8 hours  HYDROmorphone PCA (1 mG/mL) 30 milliLiter(s) PCA Continuous PCA Continuous  insulin lispro (HumaLOG) corrective regimen sliding scale   SubCutaneous three times a day before meals  insulin lispro (HumaLOG) corrective regimen sliding scale   SubCutaneous at bedtime  lidocaine   Patch 1 Patch Transdermal daily  metoprolol succinate  milliGRAM(s) Oral daily  pantoprazole    Tablet 40 milliGRAM(s) Oral before breakfast  senna 2 Tablet(s) Oral at bedtime  sodium chloride 0.9%. 1000 milliLiter(s) (30 mL/Hr) IV Continuous <Continuous>    MEDICATIONS  (PRN):  acetaminophen  IVPB .. 1000 milliGRAM(s) IV Intermittent every 6 hours PRN Moderate Pain (4 - 6)  HYDROmorphone PCA (1 mG/mL) Rescue Clinician Bolus 0.5 milliGRAM(s) IV Push every 15 minutes PRN for Pain Scale GREATER THAN 6  naloxone Injectable 0.1 milliGRAM(s) IV Push every 3 minutes PRN For ANY of the following changes in patient status:  A. RR LESS THAN 10 breaths per minute, B. Oxygen saturation LESS THAN 90%, C. Sedation score of 6  ondansetron Injectable 4 milliGRAM(s) IV Push every 6 hours PRN Nausea      OBJECTIVE:   [X] No new signs     [ ] Other:    Side Effects:  [X ] None			[ ] Other:    Assessment of Catheter Site:		[ ] Intact		[ ] Other:    ASSESSMENT/PLAN  [ X] Continue current therapy. Recommend non-opioid adjuvant analgesics to be used when possible and when allowed by primary surgical team.    [ ] Therapy changed to:    [ ] IV PCA       [ ] Epidural     [ ] prn Analgesics     Comments:    Progress Note written now but Patient was seen earlier.
LAYLA GALVAN  MRN-[id number]    Patient is a 79y old  Male who presents with a chief complaint of "I have cancer in my lungs" (17 Oct 2019 17:19)    HPI:  78y/o male with history of atrial fibrillation on Xarelto, hypertension, diabetes, thoracic aortic aneurysm ( w/o rupture), laryngeal cancer was found to have a lung nodule and is s/p flexible bronchoscopy, left video assisted thoracoscopy and left upper lobectomy. 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 He has no complaints.     PAST MEDICAL & SURGICAL HISTORY:  Other nonspecific abnormal finding of lung field  Type 2 diabetes mellitus  Other diseases of vocal cords  Atrial fibrillation  Obesity  GERD (gastroesophageal reflux disease)  Gout  Reflux  Renal cancer  Hypertension  H/O: knee surgery  S/P Nephrectomy: left, partial right    FAMILY HISTORY:  Family hx of prostate cancer  FH: type 2 diabetes mellitus    Social History:  Denies illicit drug use or frequent alcohol consumption. Endorses prior smoking.     Allergies  codeine (Other)    MEDICATIONS  (STANDING):  metoprolol succinate  milliGRAM(s) Oral daily  pantoprazole    Tablet 40 milliGRAM(s) Oral before breakfast  sodium chloride 0.9%. 1000 milliLiter(s) (30 mL/Hr) IV Continuous <Continuous>    Review of Systems:  Constitutional:  Negative for weight change, fever, malaise  HEENT:  Negative for sinus pain, hoarseness, sore throat, dysphagia, vision changes  Cardiovascular:  Negative for chest pain, palpitations, dizziness  Respiratory:  Negative for cough, wheezing, dyspnea  Gastrointestinal:  Negative for nausea, vomiting, diarrhea, melena  Musculoskeletal:  Negative for pain, swelling, stiffness   Neuro:  Negative for weakness, numbness, headache  Psych:  Negative for anxiety, depression  Endocrine:  Negative for polyuria, polydipsia, temperature Intolerance    All other systems negative unless otherwise stated    ICU Vital Signs Last 24 Hrs  T(C): 36.8 (21 Oct 2019 13:35), Max: 36.8 (21 Oct 2019 13:35)  T(F): 98.2 (21 Oct 2019 13:35), Max: 98.2 (21 Oct 2019 13:35)  HR: 69 (21 Oct 2019 13:35) (69 - 69)  BP: 128/94 (21 Oct 2019 13:35) (128/94 - 128/94)  BP(mean): --  ABP: --  ABP(mean): --  RR: 18 (21 Oct 2019 13:35) (18 - 18)  SpO2: 95% (21 Oct 2019 13:35) (95% - 95%)    Daily Height in cm: 172.72 (21 Oct 2019 13:35)    Daily   I&O's Summary    Physical Exam:  Gen: Alert, no apparent distress  CV: Regular rate and rhythm no murmurs, rubs or gallops  Pulm: Clear to auscultation bilaterally, no rales, rhonchi or wheezes  Chest: Chest tubes/drains in place with dressings clean, dry and intact  GI: Abd is soft, non-tender and non-distended with +BS  Ext: No clubbing, cyanosis or edema  Neuro: A+Ox3, follows commands and moves all extremities    Labs: Pending    Assessment/Plan: 78y/o male with history of atrial fibrillation on Xarelto, hypertension, diabetes, thoracic aortic aneurysm ( w/o rupture), laryngeal cancer was found to have a lung nodule and is s/p flexible bronchoscopy, left video assisted thoracoscopy and left upper lobectomy.    Neuro:   Pain control with PCA / Tylenol IV                                           Cardiovascular:    Stable hemodynamics  Not on any pressors  Continue Toprol  Continue hemodynamic monitoring    Respiratory:  Pt is comfortable on nasal cannula  Encourage incentive spirometry  Monitor chest tube output  Chest tube to water seal	    GI:  Clears diet  Continue bowel regimen, Protonix  Continue Zofran for nausea - PRN	          Renal:  Monitor I/Os and electrolytes    Hematologic / Oncology:  No signs of active bleeding  Monitor chest tube output    HSQ for DVT ppl    Infectious disease:  All surgical incision / chest tube sites look clean  No fever or other signs of infection     Endocrine:  Continue Accuchecks with coverage    All clinical, lab, hemodynamic and radiographic data were reviewed and the plan was discussed with CTICU team.     Kelvin Mcadams MD

## 2019-10-23 NOTE — DISCHARGE NOTE PROVIDER - HOSPITAL COURSE
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 Parikh. The procedure was uneventful as was his postoperative course besides pain. Renal function bumped slightly but returned to baseline quickly. He tolerated a regular diet. His chest tube was removed on 10/23, chest x-rays were fine. Pain was controlled with oral meds and his saturations were good so he was discharged home on 10/23/19 with follow up in 2 weeks.

## 2019-10-23 NOTE — DISCHARGE NOTE PROVIDER - CARE PROVIDERS DIRECT ADDRESSES
[email or web address]

## 2019-10-23 NOTE — DIETITIAN INITIAL EVALUATION ADULT. - ADD RECOMMEND
1). Encourage and monitor PO intake. 2). Revisit discussion on nutrition supplementation with pt if PO does not remain >75% of estimated needs. 3). Monitor weights, labs, BM's, skin integrity and edema. 4). Follow up as per protocol.

## 2019-10-27 LAB
CULTURE - SURGICAL SITE: SIGNIFICANT CHANGE UP
CULTURE - SURGICAL SITE: SIGNIFICANT CHANGE UP

## 2019-10-28 LAB — SPECIMEN SOURCE: SIGNIFICANT CHANGE UP

## 2019-10-28 NOTE — DISEASE MANAGEMENT
[FreeTextEntry4] : squamous cell carcinoma [TTNM] : 1b [NTNM] : 0 [MTNM] : 0 [de-identified] : 6,300 cGy [de-identified] : larynx

## 2019-10-30 ENCOUNTER — OTHER (OUTPATIENT)
Age: 79
End: 2019-10-30

## 2019-11-01 DIAGNOSIS — K59.00 CONSTIPATION, UNSPECIFIED: ICD-10-CM

## 2019-11-05 ENCOUNTER — APPOINTMENT (OUTPATIENT)
Dept: THORACIC SURGERY | Facility: CLINIC | Age: 79
End: 2019-11-05
Payer: MEDICARE

## 2019-11-05 PROCEDURE — 99024 POSTOP FOLLOW-UP VISIT: CPT

## 2019-11-06 ENCOUNTER — OUTPATIENT (OUTPATIENT)
Dept: OUTPATIENT SERVICES | Facility: HOSPITAL | Age: 79
LOS: 1 days | Discharge: HOME | End: 2019-11-06

## 2019-11-06 DIAGNOSIS — C34.90 MALIGNANT NEOPLASM OF UNSPECIFIED PART OF UNSPECIFIED BRONCHUS OR LUNG: ICD-10-CM

## 2019-11-06 DIAGNOSIS — Z98.890 OTHER SPECIFIED POSTPROCEDURAL STATES: Chronic | ICD-10-CM

## 2019-11-08 LAB — SPECIMEN SOURCE: SIGNIFICANT CHANGE UP

## 2019-11-11 NOTE — H&P PST ADULT - POSTERIOR CERVICAL R
Your blood pressure and heart rate are running high.  With that and feeling shaky we are concerned about alcohol withdrawal.  Please follow-up with your regular physician and return if you are interested in treatment.  We have also discussed MRI of the brain to assess you for difficulty walking.  You have refused this.  Please follow-up with your regular clinic if this continues to be an issue.  Okay to use Zofran as needed for nausea at home.   normal Complex Repair And A-T Advancement Flap Text: The defect edges were debeveled with a #15 scalpel blade.  The primary defect was closed partially with a complex linear closure.  Given the location of the remaining defect, shape of the defect and the proximity to free margins an A-T advancement flap was deemed most appropriate for complete closure of the defect.  Using a sterile surgical marker, an appropriate advancement flap was drawn incorporating the defect and placing the expected incisions within the relaxed skin tension lines where possible.    The area thus outlined was incised deep to adipose tissue with a #15 scalpel blade.  The skin margins were undermined to an appropriate distance in all directions utilizing iris scissors.

## 2019-11-12 ENCOUNTER — APPOINTMENT (OUTPATIENT)
Dept: HEMATOLOGY ONCOLOGY | Facility: CLINIC | Age: 79
End: 2019-11-12
Payer: MEDICARE

## 2019-11-12 DIAGNOSIS — Z80.42 FAMILY HISTORY OF MALIGNANT NEOPLASM OF PROSTATE: ICD-10-CM

## 2019-11-12 DIAGNOSIS — Z85.528 PERSONAL HISTORY OF OTHER MALIGNANT NEOPLASM OF KIDNEY: ICD-10-CM

## 2019-11-12 DIAGNOSIS — Z87.891 PERSONAL HISTORY OF NICOTINE DEPENDENCE: ICD-10-CM

## 2019-11-12 PROCEDURE — 99205 OFFICE O/P NEW HI 60 MIN: CPT

## 2019-11-12 NOTE — REVIEW OF SYSTEMS
[Recent Change In Weight] : ~T recent weight change [Fatigue] : fatigue [Shortness Of Breath] : shortness of breath [Hoarseness] : hoarseness [Cough] : cough [Negative] : Allergic/Immunologic

## 2019-11-12 NOTE — PHYSICAL EXAM
[Restricted in physically strenuous activity but ambulatory and able to carry out work of a light or sedentary nature] : Status 1- Restricted in physically strenuous activity but ambulatory and able to carry out work of a light or sedentary nature, e.g., light house work, office work [de-identified] : distant but clear [Normal] : grossly intact [de-identified] : large b/l nephrectomy scar

## 2019-11-12 NOTE — CONSULT LETTER
[Dear  ___] : Dear  [unfilled], [Consult Letter:] : I had the pleasure of evaluating your patient, [unfilled]. [Consult Closing:] : Thank you very much for allowing me to participate in the care of this patient.  If you have any questions, please do not hesitate to contact me. [Please see my note below.] : Please see my note below. [FreeTextEntry2] : Dr. Ron Gibbons [Sincerely,] : Sincerely, [FreeTextEntry3] : aNz Puentes MD\par \par  [DrElo  ___] : Dr. POLLOCK [DrElo ___] : Dr. POLLOCK

## 2019-11-12 NOTE — HISTORY OF PRESENT ILLNESS
[Disease: _____________________] : Disease: [unfilled] [T: ___] : T[unfilled] [AJCC Stage: ____] : AJCC Stage: [unfilled] [N: ___] : N[unfilled] [de-identified] : Mr. 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 \par Brief hx: Mr. Zarate started experiencing hoarseness of voice a few months ago. He saw Dr. Paz who noted a vocal cord lesion and referred him to see Dr. Gibbons. Dr. 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 He saw Radiation oncologist Dr. Loaiza and was scheduled to start radiation therapy. However, pre-tx PET/CT done on 8/29/19 showed an FDG-avid left upper lobe pulmonary nodule, 1.3 x 1.2 cm, SUV 8.1. He was referred to Dr. Parikh and underwent FB, uniportal Left VATS, pneumonolysis, wedge resection of SHELLI, completion LULobectomy, MLND on 10/21/19. 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 Resection margin was negative and there were no other high risk features but florid organizing pneumonia was noted. One of two hilar lymph nodes positive for metastatic carcinoma (1/2). Several other sampled nodes were negative for cancer. The patient has recovered well from cancer and is here to discuss adjuvant tx. \par He reports that he feels well, but admits to shortness of breath with exertion and a cough which is productive of clear sputum. He denies any fatigue, fever, chills, chest pain, hemoptysis, or recent illness. \par Of note, pt had renal cell ca and underwent left total nephrectomy. He is also s/p left partial nephrectomy for a what was thought to be cancer but turned out to be calcified cyst. \par He has not had screening Cts in the past [de-identified] : sq cell ca [de-identified] : synchronous laryngeal ca, T1N0M0

## 2019-11-12 NOTE — REASON FOR VISIT
[Initial Consultation] : an initial consultation [Spouse] : spouse [Family Member] : family member [FreeTextEntry2] : lung /head and neck

## 2019-11-12 NOTE — ASSESSMENT
[FreeTextEntry1] : 78 yo m with synchronous head and neck as well as lung cancers, both sq histology\par Laryngeal ca is stage I , T1N0 and lung cancer is T1N1\par Pt is 79 years old, has solitary kidney and has partial nephrectomy done on the  other side as well. even if his creatinine is normal, I would be hesitant to offer adjuvant cisplatin based chemotherapy. Benefit of Carbo-based chemo in adjuvant setting is less clear.\par Pt does not need concurrent chemo for stage I laryngeal ca\par With this in mind, I discussed two potential options: \par 1. Adjuvant carbo/taxol q week concurrent with RT to larynx \par 2. Proceeding with RT to larynx and close surveillance for recurrence of both cancers. \par \par After extensive discussion of pros and cons, pt decided to gowth RT alone to larynx and close monitoring \par I recommended brain MRI (without contrast) to complete staging work up for lung cancer. \par All questions were answered to patient's satisfaction. \par OV prn [Curative] : Goals of care discussed with patient: Curative

## 2019-11-13 ENCOUNTER — FORM ENCOUNTER (OUTPATIENT)
Age: 79
End: 2019-11-13

## 2019-11-14 ENCOUNTER — OUTPATIENT (OUTPATIENT)
Dept: OUTPATIENT SERVICES | Facility: HOSPITAL | Age: 79
LOS: 1 days | End: 2019-11-14

## 2019-11-14 ENCOUNTER — APPOINTMENT (OUTPATIENT)
Dept: MRI IMAGING | Facility: CLINIC | Age: 79
End: 2019-11-14
Payer: MEDICARE

## 2019-11-14 DIAGNOSIS — C34.90 MALIGNANT NEOPLASM OF UNSPECIFIED PART OF UNSPECIFIED BRONCHUS OR LUNG: ICD-10-CM

## 2019-11-14 DIAGNOSIS — Z98.890 OTHER SPECIFIED POSTPROCEDURAL STATES: Chronic | ICD-10-CM

## 2019-11-14 PROCEDURE — 70551 MRI BRAIN STEM W/O DYE: CPT | Mod: 26

## 2019-11-18 VITALS
OXYGEN SATURATION: 98 % | RESPIRATION RATE: 16 BRPM | HEART RATE: 80 BPM | HEIGHT: 69 IN | SYSTOLIC BLOOD PRESSURE: 136 MMHG | BODY MASS INDEX: 29.47 KG/M2 | WEIGHT: 199 LBS | DIASTOLIC BLOOD PRESSURE: 92 MMHG | TEMPERATURE: 98.2 F

## 2019-11-18 PROCEDURE — 77427 RADIATION TX MANAGEMENT X5: CPT

## 2019-11-18 PROCEDURE — 77387B: CUSTOM | Mod: 26

## 2019-11-18 NOTE — DISEASE MANAGEMENT
[Clinical] : TNM Stage: c [FreeTextEntry4] : squamous cell carcinoma [TTNM] : 1b [NTNM] : 0 [MTNM] : 0 [I] : I [de-identified] : 225 cGy [de-identified] : 6,300 cGy [de-identified] : larynx

## 2019-11-18 NOTE — REVIEW OF SYSTEMS
[Dysphagia: Grade 0] : Dysphagia: Grade 0 [Nausea: Grade 0] : Nausea: Grade 0 [Vomiting: Grade 0] : Vomiting: Grade 0 [Fatigue: Grade 1 - Fatigue relieved by rest] : Fatigue: Grade 1 - Fatigue relieved by rest

## 2019-11-19 PROCEDURE — 77014: CPT | Mod: 26

## 2019-11-20 LAB — FUNGUS SPEC QL CULT: SIGNIFICANT CHANGE UP

## 2019-11-20 PROCEDURE — 77387B: CUSTOM | Mod: 26

## 2019-11-21 PROCEDURE — 77387B: CUSTOM | Mod: 26

## 2019-11-22 PROCEDURE — 77387B: CUSTOM | Mod: 26

## 2019-11-23 ENCOUNTER — MOBILE ON CALL (OUTPATIENT)
Age: 79
End: 2019-11-23

## 2019-11-25 VITALS
BODY MASS INDEX: 29.18 KG/M2 | HEIGHT: 69 IN | SYSTOLIC BLOOD PRESSURE: 138 MMHG | TEMPERATURE: 98.2 F | OXYGEN SATURATION: 92 % | RESPIRATION RATE: 16 BRPM | DIASTOLIC BLOOD PRESSURE: 91 MMHG | WEIGHT: 197 LBS | HEART RATE: 80 BPM

## 2019-11-25 PROCEDURE — 77387B: CUSTOM | Mod: 26

## 2019-11-25 PROCEDURE — 77427 RADIATION TX MANAGEMENT X5: CPT

## 2019-11-25 NOTE — REVIEW OF SYSTEMS
[Dysphagia: Grade 0] : Dysphagia: Grade 0 [Nausea: Grade 0] : Nausea: Grade 0 [Fatigue: Grade 1 - Fatigue relieved by rest] : Fatigue: Grade 1 - Fatigue relieved by rest [Vomiting: Grade 0] : Vomiting: Grade 0

## 2019-11-25 NOTE — DISEASE MANAGEMENT
[FreeTextEntry4] : squamous cell carcinoma [Clinical] : TNM Stage: c [TTNM] : 1b [NTNM] : 0 [MTNM] : 0 [de-identified] : 225 cGy [I] : I [de-identified] : 6,300 cGy [de-identified] : larynx

## 2019-11-25 NOTE — VITALS
[Least Pain Intensity: 0/10] : 0/10 [Maximal Pain Intensity: 0/10] : 0/10 [80: Normal activity with effort; some signs or symptoms of disease.] : 80: Normal activity with effort; some signs or symptoms of disease.  [NoTreatment Scheduled] : no treatment scheduled [ECOG Performance Status: 2 - Ambulatory and capable of all self care but unable to carry out any work activities] : Performance Status: 2 - Ambulatory and capable of all self care but unable to carry out any work activities. Up and about more than 50% of waking hours

## 2019-11-26 PROCEDURE — 77014: CPT | Mod: 26

## 2019-11-27 PROCEDURE — 77387B: CUSTOM | Mod: 26

## 2019-11-29 PROCEDURE — 77387B: CUSTOM | Mod: 26

## 2019-12-02 VITALS
HEIGHT: 69 IN | SYSTOLIC BLOOD PRESSURE: 159 MMHG | WEIGHT: 201 LBS | BODY MASS INDEX: 29.77 KG/M2 | HEART RATE: 78 BPM | OXYGEN SATURATION: 92 % | DIASTOLIC BLOOD PRESSURE: 80 MMHG | RESPIRATION RATE: 16 BRPM | TEMPERATURE: 98.2 F

## 2019-12-02 LAB — ACID FAST STN SPEC: SIGNIFICANT CHANGE UP

## 2019-12-02 PROCEDURE — 77387B: CUSTOM | Mod: 26

## 2019-12-02 NOTE — DISEASE MANAGEMENT
[FreeTextEntry4] : squamous cell carcinoma [Clinical] : TNM Stage: c [TTNM] : 1b [NTNM] : 0 [MTNM] : 0 [I] : I [de-identified] : 2,250 cGy [de-identified] : larynx [de-identified] : 6,300 cGy

## 2019-12-03 PROCEDURE — 77014: CPT | Mod: 26

## 2019-12-03 PROCEDURE — 77427 RADIATION TX MANAGEMENT X5: CPT

## 2019-12-04 PROCEDURE — 77387B: CUSTOM | Mod: 26

## 2019-12-05 PROCEDURE — 77387B: CUSTOM | Mod: 26

## 2019-12-06 PROCEDURE — 77387B: CUSTOM | Mod: 26

## 2019-12-08 ENCOUNTER — OTHER (OUTPATIENT)
Age: 79
End: 2019-12-08

## 2019-12-09 VITALS
DIASTOLIC BLOOD PRESSURE: 105 MMHG | TEMPERATURE: 98 F | HEART RATE: 100 BPM | SYSTOLIC BLOOD PRESSURE: 159 MMHG | WEIGHT: 190 LBS | HEIGHT: 69 IN | BODY MASS INDEX: 28.14 KG/M2 | OXYGEN SATURATION: 96 %

## 2019-12-09 NOTE — VITALS
[Maximal Pain Intensity: 10/10] : 10/10 [Least Pain Intensity: 10/10] : 10/10 [Pain Description/Quality: ___] : Pain description/quality: [unfilled] [Pain Duration: ___] : Pain duration: [unfilled] [Pain Location: ___] : Pain Location: [unfilled] [Pain Interferes with ADLs] : Pain interferes with activities of daily living. [80: Normal activity with effort; some signs or symptoms of disease.] : 80: Normal activity with effort; some signs or symptoms of disease.  [ECOG Performance Status: 2 - Ambulatory and capable of all self care but unable to carry out any work activities] : Performance Status: 2 - Ambulatory and capable of all self care but unable to carry out any work activities. Up and about more than 50% of waking hours

## 2019-12-09 NOTE — HISTORY OF PRESENT ILLNESS
[FreeTextEntry1] : pt is having increased dysphagia and unable to swallow.  Dr Loaiza saw pt and is on break till Thursday.

## 2019-12-12 VITALS
OXYGEN SATURATION: 94 % | HEIGHT: 69 IN | WEIGHT: 190 LBS | BODY MASS INDEX: 28.14 KG/M2 | TEMPERATURE: 98 F | RESPIRATION RATE: 16 BRPM | SYSTOLIC BLOOD PRESSURE: 149 MMHG | HEART RATE: 84 BPM | DIASTOLIC BLOOD PRESSURE: 97 MMHG

## 2019-12-12 PROCEDURE — 77387B: CUSTOM | Mod: 26

## 2019-12-13 PROCEDURE — 77387B: CUSTOM | Mod: 26

## 2019-12-13 PROCEDURE — 77427 RADIATION TX MANAGEMENT X5: CPT

## 2019-12-16 VITALS
HEIGHT: 69 IN | SYSTOLIC BLOOD PRESSURE: 133 MMHG | HEART RATE: 100 BPM | WEIGHT: 190 LBS | OXYGEN SATURATION: 95 % | DIASTOLIC BLOOD PRESSURE: 85 MMHG | TEMPERATURE: 98 F | RESPIRATION RATE: 16 BRPM | BODY MASS INDEX: 28.14 KG/M2

## 2019-12-16 PROCEDURE — 77387B: CUSTOM | Mod: 26

## 2019-12-16 NOTE — HISTORY OF PRESENT ILLNESS
[FreeTextEntry1] : Mr Zarate is tolerating treatment well. He continues on Prednisone started last week due to severe dysphagia. No he tolerates regular diet.  c/o minimal throat scratching and pain. Notes  fatigue.

## 2019-12-16 NOTE — REVIEW OF SYSTEMS
[Dysphagia: Grade 1 - Symptomatic, able to eat regular diet] : Dysphagia: Grade 1 - Symptomatic, able to eat regular diet [Nausea: Grade 0] : Nausea: Grade 0 [Vomiting: Grade 0] : Vomiting: Grade 0 [Fatigue: Grade 1 - Fatigue relieved by rest] : Fatigue: Grade 1 - Fatigue relieved by rest

## 2019-12-16 NOTE — VITALS
[Least Pain Intensity: 10/10] : 10/10 [Maximal Pain Intensity: 10/10] : 10/10 [Pain Duration: ___] : Pain duration: [unfilled] [Pain Description/Quality: ___] : Pain description/quality: [unfilled] [Pain Location: ___] : Pain Location: [unfilled] [ECOG Performance Status: 2 - Ambulatory and capable of all self care but unable to carry out any work activities] : Performance Status: 2 - Ambulatory and capable of all self care but unable to carry out any work activities. Up and about more than 50% of waking hours [Pain Interferes with ADLs] : Pain interferes with activities of daily living. [80: Normal activity with effort; some signs or symptoms of disease.] : 80: Normal activity with effort; some signs or symptoms of disease.

## 2019-12-17 PROCEDURE — 77014: CPT | Mod: 26

## 2019-12-18 PROCEDURE — 77387B: CUSTOM | Mod: 26

## 2019-12-19 PROCEDURE — 77387B: CUSTOM | Mod: 26

## 2019-12-20 PROCEDURE — 77427 RADIATION TX MANAGEMENT X5: CPT

## 2019-12-20 PROCEDURE — 77387B: CUSTOM | Mod: 26

## 2019-12-23 PROCEDURE — 77387B: CUSTOM | Mod: 26

## 2019-12-23 NOTE — REVIEW OF SYSTEMS
[Dysphagia: Grade 1 - Symptomatic, able to eat regular diet] : Dysphagia: Grade 1 - Symptomatic, able to eat regular diet [Dyspepsia: Grade 0] : Dyspepsia: Grade 0 [Nausea: Grade 0] : Nausea: Grade 0 [Vomiting: Grade 0] : Vomiting: Grade 0 [Dyspnea: Grade 0] : Dyspnea: Grade 0 [Cough: Grade 0] : Cough: Grade 0 [Fatigue: Grade 1 - Fatigue relieved by rest] : Fatigue: Grade 1 - Fatigue relieved by rest [Skin Hyperpigmentation: Grade 1 - Hyperpigmentation covering <10% BSA; no psychosocial impact] : Skin Hyperpigmentation: Grade 1 - Hyperpigmentation covering <10% BSA; no psychosocial impact [Alopecia: Grade 1 - Hair loss of <50% of normal for that individual that is not obvious from a distance but only on close inspection; a different hair style may be required to cover the hair loss but it does not require a wig or hair piece to camouflage] : Alopecia: Grade 1 - Hair loss of <50% of normal for that individual that is not obvious from a distance but only on close inspection; a different hair style may be required to cover the hair loss but it does not require a wig or hair piece to camouflage [Skin Atrophy: Grade 0] : Skin Atrophy: Grade 0 [Skin Induration: Grade 0] : Skin Induration: Grade 0 [Dermatitis Radiation: Grade 1 - Faint erythema or dry desquamation] : Dermatitis Radiation: Grade 1 - Faint erythema or dry desquamation

## 2019-12-23 NOTE — PHYSICAL EXAM
[de-identified] : moderate erythema of the anterior neck; no desquamation [Normal] : oriented to person, place and time, the affect was normal, the mood was normal and not anxious

## 2019-12-23 NOTE — DISEASE MANAGEMENT
[Clinical] : TNM Stage: c [I] : I [FreeTextEntry4] : glottic larynx [TTNM] : 1b [NTNM] : 0 [MTNM] : 0 [de-identified] : 4,969cGy [de-identified] : 6,300 cGy [de-identified] : larynx

## 2019-12-24 PROCEDURE — 77014: CPT | Mod: 26

## 2019-12-26 PROCEDURE — 77387B: CUSTOM | Mod: 26

## 2019-12-27 PROCEDURE — 77387B: CUSTOM | Mod: 26

## 2019-12-30 ENCOUNTER — OTHER (OUTPATIENT)
Age: 79
End: 2019-12-30

## 2019-12-30 VITALS
HEART RATE: 95 BPM | RESPIRATION RATE: 16 BRPM | SYSTOLIC BLOOD PRESSURE: 141 MMHG | DIASTOLIC BLOOD PRESSURE: 95 MMHG | HEIGHT: 69 IN | WEIGHT: 187 LBS | OXYGEN SATURATION: 95 % | TEMPERATURE: 98 F | BODY MASS INDEX: 27.7 KG/M2

## 2019-12-30 PROCEDURE — 77387B: CUSTOM | Mod: 26

## 2019-12-30 PROCEDURE — 77427 RADIATION TX MANAGEMENT X5: CPT

## 2019-12-30 NOTE — VITALS
[Maximal Pain Intensity: 3/10] : 3/10 [Least Pain Intensity: 0/10] : 0/10 [Pain Duration: ___] : Pain duration: [unfilled] [Pain Location: ___] : Pain Location: [unfilled] [Pain Interferes with ADLs] : Pain interferes with activities of daily living. [Adjuvant (neuroleptics)] : adjuvant (neuroleptics) [80: Normal activity with effort; some signs or symptoms of disease.] : 80: Normal activity with effort; some signs or symptoms of disease.  [ECOG Performance Status: 2 - Ambulatory and capable of all self care but unable to carry out any work activities] : Performance Status: 2 - Ambulatory and capable of all self care but unable to carry out any work activities. Up and about more than 50% of waking hours

## 2019-12-30 NOTE — REVIEW OF SYSTEMS
[Dyspepsia: Grade 0] : Dyspepsia: Grade 0 [Dysphagia: Grade 1 - Symptomatic, able to eat regular diet] : Dysphagia: Grade 1 - Symptomatic, able to eat regular diet [Nausea: Grade 0] : Nausea: Grade 0 [Vomiting: Grade 0] : Vomiting: Grade 0 [Fatigue: Grade 1 - Fatigue relieved by rest] : Fatigue: Grade 1 - Fatigue relieved by rest [Dyspnea: Grade 0] : Dyspnea: Grade 0 [Cough: Grade 0] : Cough: Grade 0 [Skin Hyperpigmentation: Grade 1 - Hyperpigmentation covering <10% BSA; no psychosocial impact] : Skin Hyperpigmentation: Grade 1 - Hyperpigmentation covering <10% BSA; no psychosocial impact [Skin Induration: Grade 0] : Skin Induration: Grade 0 [Dermatitis Radiation: Grade 1 - Faint erythema or dry desquamation] : Dermatitis Radiation: Grade 1 - Faint erythema or dry desquamation

## 2019-12-30 NOTE — DISEASE MANAGEMENT
[Clinical] : TNM Stage: c [FreeTextEntry4] : glottic larynx [TTNM] : 1b [NTNM] : 0 [MTNM] : 0 [I] : I [de-identified] : 5,850 cGy [de-identified] : 6,300 cGy [de-identified] : larynx

## 2019-12-30 NOTE — PHYSICAL EXAM
[Normal] : oriented to person, place and time, the affect was normal, the mood was normal and not anxious [de-identified] : moderate erythema of bilateral neck; no desquamation

## 2019-12-31 PROCEDURE — 77014: CPT | Mod: 26

## 2020-01-02 PROCEDURE — 77387B: CUSTOM | Mod: 26

## 2020-01-20 ENCOUNTER — APPOINTMENT (OUTPATIENT)
Dept: CT IMAGING | Facility: CLINIC | Age: 80
End: 2020-01-20
Payer: MEDICARE

## 2020-01-20 ENCOUNTER — OUTPATIENT (OUTPATIENT)
Dept: OUTPATIENT SERVICES | Facility: HOSPITAL | Age: 80
LOS: 1 days | End: 2020-01-20

## 2020-01-20 DIAGNOSIS — C34.90 MALIGNANT NEOPLASM OF UNSPECIFIED PART OF UNSPECIFIED BRONCHUS OR LUNG: ICD-10-CM

## 2020-01-20 DIAGNOSIS — Z98.890 OTHER SPECIFIED POSTPROCEDURAL STATES: Chronic | ICD-10-CM

## 2020-01-20 PROCEDURE — 71250 CT THORAX DX C-: CPT | Mod: 26

## 2020-02-04 ENCOUNTER — APPOINTMENT (OUTPATIENT)
Dept: THORACIC SURGERY | Facility: CLINIC | Age: 80
End: 2020-02-04
Payer: MEDICARE

## 2020-02-04 VITALS
RESPIRATION RATE: 17 BRPM | SYSTOLIC BLOOD PRESSURE: 113 MMHG | OXYGEN SATURATION: 97 % | BODY MASS INDEX: 26.58 KG/M2 | HEART RATE: 89 BPM | DIASTOLIC BLOOD PRESSURE: 77 MMHG | WEIGHT: 180 LBS

## 2020-02-04 DIAGNOSIS — R91.1 SOLITARY PULMONARY NODULE: ICD-10-CM

## 2020-02-04 PROCEDURE — 99214 OFFICE O/P EST MOD 30 MIN: CPT

## 2020-02-04 RX ORDER — PREDNISONE 5 MG/5ML
5 SOLUTION ORAL
Qty: 200 | Refills: 1 | Status: COMPLETED | COMMUNITY
Start: 2019-12-30 | End: 2020-02-04

## 2020-02-04 RX ORDER — PREDNISONE 5 MG/5ML
5 SOLUTION ORAL EVERY 8 HOURS
Qty: 450 | Refills: 0 | Status: COMPLETED | COMMUNITY
Start: 2019-12-08 | End: 2020-02-04

## 2020-02-06 NOTE — ASSESSMENT
[FreeTextEntry1] : 79 year old male presenting for postop evaluation S/P FB, uniportal Left VATS, pneumonolysis, wedge resection of SHELLI, completion LULobectomy, MLND on 10/21/19. Pathology revealed T1bN1 squamous cell carcinoma. Resection margin is negative for tumor. Florid organizing pneumonia. One of two hilar lymph nodes positive for metastatic carcinoma (1/2). No residual tumor identified within lung parenchyma. Bronchovascular margin is negative for carcinoma. The current tumor was compared to the left vocal cord biopsy and the tumors are dissimilar. A primary squamous cell carcinoma of the lung is favored. The discrepancy between the gross measurement and the tumor size is that the majority of the mass is compromised of an organizing pneumonia. To date cultures for AFB, Gram stain are negative. \par \par CT Chest on 1/20/2020:\par - post-op changes\par - stable 5mm solid RUL nodule\par \par I have reviewed the patient's medical records and CT images at time of this office consultation and I recommended a repeat CT Chest w/o contrast in 3mo to f/u the RUL nodule.\par \par  \par I personally performed the services described in the documentation, reviewed the documentation recorded by the scribe in my presence and it accurately and completely records my words and actions.\par \par I, Hunter Bar NP, am scribing for and the presence of HENRI Flores, the following sections HISTORY OF PRESENT ILLNESS, PAST MEDICAL/FAMILY/SOCIAL HISTORY; REVIEW OF SYSTEMS; VITAL SIGNS; PHYSICAL EXAM; DISPOSITION.\par \par

## 2020-02-06 NOTE — HISTORY OF PRESENT ILLNESS
[FreeTextEntry1] : 79 year old male presenting for postop evaluation S/P FB, uniportal Left VATS, pneumonolysis, wedge resection of SHELLI, completion LULobectomy, MLND on 10/21/19. Pathology revealed T1bN1 squamous cell carcinoma. Resection margin is negative for tumor. Florid organizing pneumonia. One of two hilar lymph nodes positive for metastatic carcinoma (1/2). No residual tumor identified within lung parenchyma. Bronchovascular margin is negative for carcinoma. The current tumor was compared to the left vocal cord biopsy and the tumors are dissimilar. A primary squamous cell carcinoma of the lung is favored. The discrepancy between the gross measurement and the tumor size is that the majority of the mass is compromised of an organizing pneumonia. To date cultures for AFB, Gram stain are negative. 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 PET/CT was done which revealed an FDG-avid lung nodule. He consulted with Radiation oncologist Dr. Loaiza and was scheduled to start radiation therapy, but this was delayed so he could forego lung surgery. \par \par He is a former heavy smoker (3 PPD x 40 years, quit 1995) and PMHx also includes AFib, DM2, HTN and OA. \par \par CXR 11/4/19- clear lungs, no pneumothorax.\par \par On previous visit, he was referred to Dr Puentes for possible adjuvant therapy and recommended beginning RT therapy with Dr. Loaiza.\par \par CT Chest on 1/20/2020:\par - post-op changes\par - stable 5mm solid RUL nodule\par \par He presents for f/u 3 months. XRT to vocal cord started with Dr. Loaiza, c/o sore throat, SOB, thick whitish sputum, fatigue and insomnia. \par

## 2020-02-06 NOTE — PHYSICAL EXAM
[Heart Sounds] : normal S1 and S2 [Heart Rate And Rhythm] : heart rate was normal and rhythm regular [Auscultation Breath Sounds / Voice Sounds] : lungs were clear to auscultation bilaterally [Heart Sounds Gallop] : no gallops [Murmurs] : no murmurs [Heart Sounds Pericardial Friction Rub] : no pericardial rub [Diminished Respiratory Excursion] : normal chest expansion [Examination Of The Chest] : the chest was normal in appearance [Chest Visual Inspection Thoracic Asymmetry] : no chest asymmetry [Bowel Sounds] : normal bowel sounds [Abdomen Soft] : soft [Abdomen Tenderness] : non-tender [Abdomen Mass (___ Cm)] : no abdominal mass palpated [Skin Color & Pigmentation] : normal skin color and pigmentation [Skin Turgor] : normal skin turgor [] : no rash [Deep Tendon Reflexes (DTR)] : deep tendon reflexes were 2+ and symmetric [Sensation] : the sensory exam was normal to light touch and pinprick [No Focal Deficits] : no focal deficits [Oriented To Time, Place, And Person] : oriented to person, place, and time [Affect] : the affect was normal [Impaired Insight] : insight and judgment were intact [FreeTextEntry1] : Lt VATS incision well healed

## 2020-02-13 ENCOUNTER — APPOINTMENT (OUTPATIENT)
Dept: RADIATION ONCOLOGY | Facility: CLINIC | Age: 80
End: 2020-02-13
Payer: MEDICARE

## 2020-02-13 VITALS
HEIGHT: 69 IN | WEIGHT: 184 LBS | SYSTOLIC BLOOD PRESSURE: 128 MMHG | BODY MASS INDEX: 27.25 KG/M2 | HEART RATE: 90 BPM | OXYGEN SATURATION: 95 % | DIASTOLIC BLOOD PRESSURE: 90 MMHG

## 2020-02-13 PROCEDURE — 99024 POSTOP FOLLOW-UP VISIT: CPT

## 2020-02-13 NOTE — REASON FOR VISIT
[Head and Neck Cancer] : head and neck cancer [Post-Treatment Evaluation] : post-treatment evaluation for [Family Member] : family member [Friend] : friend

## 2020-02-14 NOTE — REVIEW OF SYSTEMS
[Fatigue] : fatigue [Constipation] : constipation [SOB on Exertion] : shortness of breath during exertion [Xerostomia: Grade 1 - Symptomatic (e.g., dry or thick saliva) without significant dietary alteration; unstimulated saliva flow >0.2 ml/min] : Xerostomia: Grade 1 - Symptomatic (e.g., dry or thick saliva) without significant dietary alteration; unstimulated saliva flow >0.2 ml/min [Mucositis Oral: Grade 0] : Mucositis Oral: Grade 0  [Dysgeusia: Grade 1- Altered taste but no change in diet] : Dysgeusia: Grade 1 - Altered taste but no change in diet [Negative] : Allergic/Immunologic

## 2020-02-14 NOTE — HISTORY OF PRESENT ILLNESS
[FreeTextEntry1] : The patient is a 78 y/o man with a significant smoking history (120pk yrs) and a remote history of kidney cancer s/p left total nephrectomy and partial right nephrectomy. 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 also s/p  FB, uniportal Left VATS, pneumonolysis, wedge resection of SHELLI, completion JOSE Lobectomy, MLND on 10/21/19 for upper lobe pulmonary nodule. 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 Symptoms of hoarseness, odynophagia resolved. Reports dysgeusia, xerostomia, fatigue. Notes much mucous which disrupts sleep. Tolerates regular diet.

## 2020-02-14 NOTE — DISEASE MANAGEMENT
[Clinical] : TNM Stage: c [I] : I [TTNM] : 1b [de-identified] : 6,300 cGy [NTNM] : 0 [MTNM] : 0 [de-identified] : Larynx

## 2020-02-14 NOTE — ASSESSMENT
[No evidence of disease] : No evidence of disease [FreeTextEntry1] : modest resolving treatment related sequelae.

## 2020-02-21 ENCOUNTER — APPOINTMENT (OUTPATIENT)
Dept: OTOLARYNGOLOGY | Facility: CLINIC | Age: 80
End: 2020-02-21
Payer: MEDICARE

## 2020-02-21 VITALS
DIASTOLIC BLOOD PRESSURE: 89 MMHG | HEIGHT: 69 IN | HEART RATE: 106 BPM | SYSTOLIC BLOOD PRESSURE: 136 MMHG | BODY MASS INDEX: 27.11 KG/M2 | WEIGHT: 183 LBS

## 2020-02-21 PROCEDURE — 99214 OFFICE O/P EST MOD 30 MIN: CPT | Mod: 25

## 2020-02-21 PROCEDURE — 31575 DIAGNOSTIC LARYNGOSCOPY: CPT

## 2020-02-21 NOTE — CONSULT LETTER
[Dear  ___] : Dear  [unfilled], [Courtesy Letter:] : I had the pleasure of seeing your patient, [unfilled], in my office today. [Please see my note below.] : Please see my note below. [Consult Closing:] : Thank you very much for allowing me to participate in the care of this patient.  If you have any questions, please do not hesitate to contact me. [Sincerely,] : Sincerely, [FreeTextEntry2] : Dr. Pablo Paz\[address],\[place] [address]  [FreeTextEntry3] : Dev

## 2020-02-21 NOTE — PROCEDURE
[None] : none [Flexible Endoscope] : examined with the flexible endoscope [Serial Number: ___] : Serial Number: [unfilled] [de-identified] : No lesions in the NPx, OPx, HPx or larynx.  Expected posttreatment changes, VC are mobile, airway patent.\par  [de-identified] : Glottic SCCa

## 2020-02-21 NOTE — HISTORY OF PRESENT ILLNESS
[de-identified] : 78 y/o male refer by Dr. Paz for left vocal cord lesion. pt has hx of hoarseness on and off and recently went to see Dr. Paz and did laryngoscopy showed left VC with rough irregularity. + ETOH - beers 2-3, quit 20 yrs ago smoking.  He has an approx. 120 pack year history. 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 pt completed radiation tx with Dr. Loaiza on 1/2/2020, pt c.o lot of phlegm in the throat, dysphagia is improved, fatigue is improving , no dyspnea. wt stable now. pt is on regular diet. last imaging ct of chest 1/20/2020.\par

## 2020-04-29 ENCOUNTER — APPOINTMENT (OUTPATIENT)
Dept: RADIATION ONCOLOGY | Facility: CLINIC | Age: 80
End: 2020-04-29
Payer: MEDICARE

## 2020-04-29 PROCEDURE — 99441: CPT | Mod: CR

## 2020-04-29 NOTE — LETTER CLOSING
[Sincerely yours,] : Sincerely yours, [FreeTextEntry3] : Dominick Loaiza MD\par Physician in Chief\par Department of Radiation Medicine\par [facility] [place]\par [facility] [place]\par \par  of Radiation Medicine\par [facility]\par at  [facility] [place]/[facility]\par \par Radiation \par [facility]/\par [facility] [place]\par [address]\par [place], [place] [address]\par \par Tel: [phone]\par Fax: [phone]\par \par

## 2020-04-29 NOTE — VITALS
[Least Pain Intensity: 0/10] : 0/10 [ECOG Performance Status: 1 - Restricted in physically strenuous activity but ambulatory and able to carry out work of a light or sedentary nature] : Performance Status: 1 - Restricted in physically strenuous activity but ambulatory and able to carry out work of a light or sedentary nature, e.g., light house work, office work [80: Normal activity with effort; some signs or symptoms of disease.] : 80: Normal activity with effort; some signs or symptoms of disease.  [Maximal Pain Intensity: 1/10] : 1/10

## 2020-04-29 NOTE — HISTORY OF PRESENT ILLNESS
[Home] : at home, [unfilled] , at the time of the visit. [Medical Office: ([facility])___] : at the medical office located in  [Other:____] : [unfilled] [Self] : self [Patient] : the patient [FreeTextEntry1] : The patient is a 80 y/o man with a significant smoking history (120pk yrs) and a remote history of kidney cancer s/p left total nephrectomy and partial right nephrectomy. 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 also s/p  FB, uniportal Left VATS, pneumonolysis, wedge resection of SHELLI, completion JOSE Lobectomy, MLND on 10/21/19 for upper lobe pulmonary nodule. 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 Reports dysgeusia, xerostomia improved, excessive mucus production is less and does not keep him awake at night.. Tolerates regular diet. [FreeTextEntry4] : Hi Drew, NP

## 2020-04-29 NOTE — ADDENDUM
[FreeTextEntry1] : At the patient's request , this encounter was via telephone only and lasted 10 min.

## 2020-04-29 NOTE — REVIEW OF SYSTEMS
[SOB on Exertion] : shortness of breath during exertion [Fatigue] : fatigue [Constipation] : constipation [Negative] : Heme/Lymph [Xerostomia: Grade 1 - Symptomatic (e.g., dry or thick saliva) without significant dietary alteration; unstimulated saliva flow >0.2 ml/min] : Xerostomia: Grade 1 - Symptomatic (e.g., dry or thick saliva) without significant dietary alteration; unstimulated saliva flow >0.2 ml/min [Mucositis Oral: Grade 0] : Mucositis Oral: Grade 0  [Dysgeusia: Grade 1- Altered taste but no change in diet] : Dysgeusia: Grade 1 - Altered taste but no change in diet

## 2020-04-29 NOTE — DISEASE MANAGEMENT
[Clinical] : TNM Stage: c [I] : I [TTNM] : 1b [MTNM] : 0 [NTNM] : 0 [de-identified] : Larynx [de-identified] : 6,300 cGy

## 2020-04-29 NOTE — LETTER GREETING
[Dear Doctor] : Dear Doctor, [Please see my note below.] : Please see my note below. [Follow-Up] : Your patient, [unfilled] was seen in my office today for follow-up [FreeTextEntry2] : Ron Gibbons MD

## 2020-05-06 ENCOUNTER — APPOINTMENT (OUTPATIENT)
Dept: CT IMAGING | Facility: CLINIC | Age: 80
End: 2020-05-06
Payer: MEDICARE

## 2020-05-06 ENCOUNTER — RESULT REVIEW (OUTPATIENT)
Age: 80
End: 2020-05-06

## 2020-05-06 ENCOUNTER — OUTPATIENT (OUTPATIENT)
Dept: OUTPATIENT SERVICES | Facility: HOSPITAL | Age: 80
LOS: 1 days | End: 2020-05-06
Payer: MEDICARE

## 2020-05-06 DIAGNOSIS — Z98.890 OTHER SPECIFIED POSTPROCEDURAL STATES: Chronic | ICD-10-CM

## 2020-05-06 DIAGNOSIS — C34.90 MALIGNANT NEOPLASM OF UNSPECIFIED PART OF UNSPECIFIED BRONCHUS OR LUNG: ICD-10-CM

## 2020-05-06 DIAGNOSIS — Z00.00 ENCOUNTER FOR GENERAL ADULT MEDICAL EXAMINATION WITHOUT ABNORMAL FINDINGS: ICD-10-CM

## 2020-05-06 PROCEDURE — 71250 CT THORAX DX C-: CPT | Mod: 26

## 2020-05-06 PROCEDURE — 71250 CT THORAX DX C-: CPT

## 2020-05-19 ENCOUNTER — APPOINTMENT (OUTPATIENT)
Dept: THORACIC SURGERY | Facility: CLINIC | Age: 80
End: 2020-05-19

## 2020-06-08 ENCOUNTER — APPOINTMENT (OUTPATIENT)
Dept: OTOLARYNGOLOGY | Facility: CLINIC | Age: 80
End: 2020-06-08
Payer: MEDICARE

## 2020-06-08 VITALS
HEIGHT: 70.8 IN | HEART RATE: 92 BPM | TEMPERATURE: 97.2 F | WEIGHT: 183 LBS | SYSTOLIC BLOOD PRESSURE: 144 MMHG | BODY MASS INDEX: 25.62 KG/M2 | DIASTOLIC BLOOD PRESSURE: 82 MMHG

## 2020-06-08 PROCEDURE — 31575 DIAGNOSTIC LARYNGOSCOPY: CPT

## 2020-06-08 PROCEDURE — 99214 OFFICE O/P EST MOD 30 MIN: CPT | Mod: 25

## 2020-06-08 RX ORDER — METOPROLOL SUCCINATE 100 MG
100 TABLET, EXTENDED RELEASE 24 HR ORAL
Refills: 0 | Status: COMPLETED | COMMUNITY
End: 2020-06-08

## 2020-06-08 RX ORDER — LACTULOSE 10 G/15ML
10 SOLUTION ORAL DAILY
Qty: 30 | Refills: 0 | Status: COMPLETED | COMMUNITY
Start: 2019-11-01 | End: 2020-06-08

## 2020-06-08 RX ORDER — DIPHENHYDRAMINE HYDROCHLORIDE AND LIDOCAINE HYDROCHLORIDE AND ALUMINUM HYDROXIDE AND MAGNESIUM HYDRO
KIT
Qty: 1 | Refills: 3 | Status: COMPLETED | COMMUNITY
Start: 2019-12-03 | End: 2020-06-08

## 2020-06-08 RX ORDER — DIPHENHYDRAMINE HYDROCHLORIDE AND LIDOCAINE HYDROCHLORIDE AND ALUMINUM HYDROXIDE AND MAGNESIUM HYDRO
KIT
Qty: 1 | Refills: 3 | Status: COMPLETED | COMMUNITY
Start: 2019-12-04 | End: 2020-06-08

## 2020-06-08 NOTE — CONSULT LETTER
[Dear  ___] : Dear  [unfilled], [Courtesy Letter:] : I had the pleasure of seeing your patient, [unfilled], in my office today. [Please see my note below.] : Please see my note below. [Consult Closing:] : Thank you very much for allowing me to participate in the care of this patient.  If you have any questions, please do not hesitate to contact me. [Sincerely,] : Sincerely, [FreeTextEntry2] : Dr. Pablo Paz\[address],\[place] [address]  [FreeTextEntry3] : Dev

## 2020-06-08 NOTE — HISTORY OF PRESENT ILLNESS
[de-identified] : 81 y/o male who completed radiation tx for VC SCCa with Dr. Loaiza on 1/2/2020 is here for f/up. Pt denies dysphonia, dysphagia, pain, SOB, otalgia.  His weight is stable.  He denies any hemoptysis.  \par \par

## 2020-06-08 NOTE — PROCEDURE
[None] : none [Flexible Endoscope] : examined with the flexible endoscope [Serial Number: ___] : Serial Number: [unfilled] [de-identified] : No lesions in the NPx, OPx, HPx or larynx.  Stable posttreatment changes, VC are mobile, airway patent.\par  [de-identified] : Glottic SCCa

## 2020-07-22 ENCOUNTER — APPOINTMENT (OUTPATIENT)
Dept: RADIATION ONCOLOGY | Facility: CLINIC | Age: 80
End: 2020-07-22
Payer: MEDICARE

## 2020-07-22 VITALS
SYSTOLIC BLOOD PRESSURE: 145 MMHG | TEMPERATURE: 98 F | HEIGHT: 70 IN | BODY MASS INDEX: 27.49 KG/M2 | WEIGHT: 192 LBS | OXYGEN SATURATION: 96 % | HEART RATE: 82 BPM | DIASTOLIC BLOOD PRESSURE: 82 MMHG

## 2020-07-22 PROCEDURE — 99213 OFFICE O/P EST LOW 20 MIN: CPT

## 2020-07-22 NOTE — REVIEW OF SYSTEMS
[SOB on Exertion] : shortness of breath during exertion [Fatigue] : fatigue [Mucositis Oral: Grade 0] : Mucositis Oral: Grade 0  [Xerostomia: Grade 1 - Symptomatic (e.g., dry or thick saliva) without significant dietary alteration; unstimulated saliva flow >0.2 ml/min] : Xerostomia: Grade 1 - Symptomatic (e.g., dry or thick saliva) without significant dietary alteration; unstimulated saliva flow >0.2 ml/min [Dysgeusia: Grade 1- Altered taste but no change in diet] : Dysgeusia: Grade 1 - Altered taste but no change in diet [Negative] : Genitourinary [FreeTextEntry4] : persistent xerostomia and dysgeusia (minimal) [FreeTextEntry6] : s/p left upper lobectomy for squamous cell carcinoma [FreeTextEntry8] : history of kidney cancer -- left total nephrectomy, right partial nephrectomy [FreeTextEntry9] : intermittent left chest wall ache, at times disruptive of sleep.

## 2020-07-22 NOTE — ASSESSMENT
[No evidence of disease] : No evidence of disease [FreeTextEntry1] : modest resolving treatment related sequelae. Left chest wall ache likely musculoskeletal in etiology.

## 2020-07-22 NOTE — VITALS
[ECOG Performance Status: 1 - Restricted in physically strenuous activity but ambulatory and able to carry out work of a light or sedentary nature] : Performance Status: 1 - Restricted in physically strenuous activity but ambulatory and able to carry out work of a light or sedentary nature, e.g., light house work, office work [80: Normal activity with effort; some signs or symptoms of disease.] : 80: Normal activity with effort; some signs or symptoms of disease.  [Maximal Pain Intensity: 6/10] : 6/10 [Least Pain Intensity: 2/10] : 2/10 [Pain Location: ___] : Pain Location: [unfilled] [Opioid] : opioid

## 2020-07-22 NOTE — PHYSICAL EXAM
[Normal] : oriented to person, place and time, the affect was normal, the mood was normal and not anxious [de-identified] : not chest wall discomfort with percussion. [de-identified] : well healed lower abdominal surgical scar. [de-identified] : mirror exam of hypopharynx unremarkable save for some thick mucous pooling.

## 2020-07-22 NOTE — REASON FOR VISIT
[Routine Follow-Up] : routine follow-up visit for [Head and Neck Cancer] : head and neck cancer [Other: _____] : [unfilled]

## 2020-07-22 NOTE — LETTER CLOSING
[Sincerely yours,] : Sincerely yours, [FreeTextEntry3] : Dominick Loaiza MD\par Physician in Chief\par Department of Radiation Medicine\par [facility] [place]\par [facility] [place]\par \par  of Radiation Medicine\par [facility]\par at  [facility]\par \par Radiation \par [facility]/\par [facility] at [place]\par [address]\par [place], [place] [address]\par \par Tel: [phone]\par Fax: [phone]\par \par

## 2020-07-22 NOTE — DISEASE MANAGEMENT
[Clinical] : TNM Stage: c [I] : I [FreeTextEntry4] : squamous cell carcinoma [TTNM] : 1b [NTNM] : 0 [MTNM] : 0 [de-identified] : 6,300 cGy [de-identified] : Larynx

## 2020-07-22 NOTE — LETTER GREETING
[Dear Doctor] : Dear Doctor, [Follow-Up] : Your patient, [unfilled] was seen in my office today for follow-up [Please see my note below.] : Please see my note below. [FreeTextEntry2] : Ron Gibbons MD

## 2020-08-03 ENCOUNTER — RESULT REVIEW (OUTPATIENT)
Age: 80
End: 2020-08-03

## 2020-08-03 ENCOUNTER — OUTPATIENT (OUTPATIENT)
Dept: OUTPATIENT SERVICES | Facility: HOSPITAL | Age: 80
LOS: 1 days | End: 2020-08-03

## 2020-08-03 ENCOUNTER — APPOINTMENT (OUTPATIENT)
Dept: CT IMAGING | Facility: CLINIC | Age: 80
End: 2020-08-03
Payer: MEDICARE

## 2020-08-03 DIAGNOSIS — Z98.890 OTHER SPECIFIED POSTPROCEDURAL STATES: Chronic | ICD-10-CM

## 2020-08-03 DIAGNOSIS — C34.90 MALIGNANT NEOPLASM OF UNSPECIFIED PART OF UNSPECIFIED BRONCHUS OR LUNG: ICD-10-CM

## 2020-08-03 PROCEDURE — 71250 CT THORAX DX C-: CPT | Mod: 26

## 2020-08-04 ENCOUNTER — APPOINTMENT (OUTPATIENT)
Dept: THORACIC SURGERY | Facility: CLINIC | Age: 80
End: 2020-08-04
Payer: MEDICARE

## 2020-08-04 DIAGNOSIS — R91.1 SOLITARY PULMONARY NODULE: ICD-10-CM

## 2020-08-04 PROCEDURE — 99214 OFFICE O/P EST MOD 30 MIN: CPT

## 2020-08-04 NOTE — DATA REVIEWED
[FreeTextEntry1] : CT chest on 05/06/2020:\par - post-op changes\par - 0.5 cm solid nodule in the anterior segment of the RUL is unchanged\par - few small LNs are present in the pretracheal space and the AP window\par \par CT chest on 08/03/2020:\par - post-op changes\par - 2.2 cm and 1.2 short axis paraortic LNs (2: 40) are enlarged; previously 1.3 x 0.6 cm respectively, concerning for metastatic disease\par - 5 mm anterior RUL nodule is stable\par - mild bandlike atelectasis within the RML\par - approximately 4 cm indistinct hypodense lesion within the right lobe of the liver, suspicious for metastatic disease. Postsurgical changes within the left retroperitoneum is unchanged\par - lytic destructive lesion within the right 8th rib (6: 73) likely representing a metastatic bone lesion

## 2020-08-04 NOTE — ASSESSMENT
[FreeTextEntry1] : 80 year old male. He is s/p FB, uniportal Left VATS, pneumonolysis, wedge resection of SHELLI, completion LULobectomy, MLND on 10/21/19. Pathology revealed T1bN1 squamous cell carcinoma. Resection margin is negative for tumor. Florid organizing pneumonia. One of two hilar lymph nodes positive for metastatic carcinoma (1/2). No residual tumor identified within lung parenchyma. Bronchovascular margin is negative for carcinoma. The current tumor was compared to the left vocal cord biopsy and the tumors are dissimilar. A primary squamous cell carcinoma of the lung is favored. The discrepancy between the gross measurement and the tumor size is that the majority of the mass is compromised of an organizing pneumonia. To date cultures for AFB, Gram stain are negative. \par \par He is a former heavy smoker (3 PPD x 40 years, quit 1995) and PMHx also includes AFib, DM2, HTN and OA, kidney cancer s/p left total nephrectomy and partial right nephrectomy. 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 PET/CT was done which revealed an FDG-avid lung nodule. He consulted with Radiation oncologist Dr. Loaiza and was scheduled to start radiation therapy, but this was delayed so he could forego lung surgery. \par \par Patient was referral to Dr. Puentes and recommended adjuvant carbo/taxol q week concurrent with RT to larynx or Proceeding with RT to larynx and close surveillance for recurrence of both cancers.  Patient decided to go with RT alone to larynx and close monitoring. Patient is s/p XRT to vocal cord completed with Dr. Dominick Loaiza on 01/02/2020 for total: 6,300 cGy. \par \par CT Chest on 1/20/2020:\par - post-op changes\par - stable 5mm solid RUL nodule\par \par Last seen patient was on 02/04/2020 and I recommended a  3 months to f/u the RUL nodule. No F/u in May, 2020. \par \par CT chest on 05/06/2020:\par - post-op changes\par - 0.5 cm solid nodule in the anterior segment of the RUL is unchanged\par - few small LNs are present in the pretracheal space and the AP window\par \par CT chest on 08/03/2020:\par - post-op changes\par - 2.2 cm and 1.2 short axis paraortic LNs (2: 40) are enlarged; previously 1.3 x 0.6 cm respectively, concerning for metastatic disease\par - 5 mm anterior RUL nodule is stable\par - mild bandlike atelectasis within the RML\par - approximately 4 cm indistinct hypodense lesion within the right lobe of the liver, suspicious for metastatic disease. Postsurgical changes within the left retroperitoneum is unchanged\par - lytic destructive lesion within the right 8th rib (6: 73) likely representing a metastatic bone lesion. \par \par I have reviewed the patient's medical records and diagnostic images at time of this office consultation and have made the following recommendation:\par 1. Follow up with Oncology. I recommended retuning to Dr Puentes but patient prefers to see Oncologist by where he lives in [place]. \par \par Written by Noris Mckinney NP, acting as a scribe for Mauro Parikh MD.\par The documentation recorded by the scribe accurately reflects the service I personally performed and the decisions made by me. Mauro Parikh MD

## 2020-08-04 NOTE — CONSULT LETTER
[FreeTextEntry3] : Mauro Parikh MD, FACS \par Chief, Division of Thoracic Surgery \par Director, Minimally Invasive Thoracic Surgery \par Department of Cardiovascular and Thoracic Surgery \par [facility] \par , Cardiovascular and Thoracic Surgery\par \par

## 2020-08-04 NOTE — HISTORY OF PRESENT ILLNESS
[FreeTextEntry1] : 80 year old male. He is s/p FB, uniportal Left VATS, pneumonolysis, wedge resection of SHELLI, completion LULobectomy, MLND on 10/21/19. Pathology revealed T1bN1 squamous cell carcinoma. Resection margin is negative for tumor. Florid organizing pneumonia. One of two hilar lymph nodes positive for metastatic carcinoma (1/2). No residual tumor identified within lung parenchyma. Bronchovascular margin is negative for carcinoma. The current tumor was compared to the left vocal cord biopsy and the tumors are dissimilar. A primary squamous cell carcinoma of the lung is favored. The discrepancy between the gross measurement and the tumor size is that the majority of the mass is compromised of an organizing pneumonia. To date cultures for AFB, Gram stain are negative. \par \par He is a former heavy smoker (3 PPD x 40 years, quit 1995) and PMHx also includes AFib, DM2, HTN and OA, kidney cancer s/p left total nephrectomy and partial right nephrectomy. 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 PET/CT was done which revealed an FDG-avid lung nodule. He consulted with Radiation oncologist Dr. Loaiza and was scheduled to start radiation therapy, but this was delayed so he could forego lung surgery. \par \par Patient was referral to Dr. Puentes and recommended adjuvant carbo/taxol q week concurrent with RT to larynx or Proceeding with RT to larynx and close surveillance for recurrence of both cancers.  Patient decided to go with RT alone to larynx and close monitoring. Patient is s/p XRT to vocal cord completed with Dr. Dominick Loaiza on 01/02/2020 for total: 6,300 cGy. \par \par CT Chest on 1/20/2020:\par - post-op changes\par - stable 5mm solid RUL nodule\par \par Last seen patient was on 02/04/2020 and I recommended a  3 months to f/u the RUL nodule. No F/u in May, 2020. \par \par CT chest on 05/06/2020:\par - post-op changes\par - 0.5 cm solid nodule in the anterior segment of the RUL is unchanged\par - few small LNs are present in the pretracheal space and the AP window\par \par CT chest on 08/03/2020:\par - post-op changes\par - 2.2 cm and 1.2 short axis paraortic LNs (2: 40) are enlarged; previously 1.3 x 0.6 cm respectively, concerning for metastatic disease\par - 5 mm anterior RUL nodule is stable\par - mild bandlike atelectasis within the RML\par - approximately 4 cm indistinct hypodense lesion within the right lobe of the liver, suspicious for metastatic disease. Postsurgical changes within the left retroperitoneum is unchanged\par - lytic destructive lesion within the right 8th rib (6: 73) likely representing a metastatic bone lesion.

## 2020-08-04 NOTE — REASON FOR VISIT
[Follow-Up: _____] : a [unfilled] follow-up visit [Verbal consent obtained from patient] : the patient, [unfilled] [FreeTextEntry4] : Noris Mckinney, NP

## 2020-08-05 ENCOUNTER — APPOINTMENT (OUTPATIENT)
Dept: PULMONOLOGY | Facility: CLINIC | Age: 80
End: 2020-08-05

## 2020-08-06 ENCOUNTER — OUTPATIENT (OUTPATIENT)
Dept: OUTPATIENT SERVICES | Facility: HOSPITAL | Age: 80
LOS: 1 days | Discharge: HOME | End: 2020-08-06

## 2020-08-06 DIAGNOSIS — Z98.890 OTHER SPECIFIED POSTPROCEDURAL STATES: Chronic | ICD-10-CM

## 2020-08-06 DIAGNOSIS — C34.90 MALIGNANT NEOPLASM OF UNSPECIFIED PART OF UNSPECIFIED BRONCHUS OR LUNG: ICD-10-CM

## 2020-08-07 ENCOUNTER — RESULT REVIEW (OUTPATIENT)
Age: 80
End: 2020-08-07

## 2020-08-07 ENCOUNTER — APPOINTMENT (OUTPATIENT)
Dept: HEMATOLOGY ONCOLOGY | Facility: CLINIC | Age: 80
End: 2020-08-07
Payer: MEDICARE

## 2020-08-07 VITALS
HEIGHT: 70 IN | HEART RATE: 81 BPM | WEIGHT: 190.04 LBS | BODY MASS INDEX: 27.21 KG/M2 | OXYGEN SATURATION: 95 % | SYSTOLIC BLOOD PRESSURE: 140 MMHG | DIASTOLIC BLOOD PRESSURE: 92 MMHG

## 2020-08-07 LAB
BASOPHILS # BLD AUTO: 0.1 K/UL — SIGNIFICANT CHANGE UP (ref 0–0.2)
BASOPHILS NFR BLD AUTO: 0.8 % — SIGNIFICANT CHANGE UP (ref 0–2)
EOSINOPHIL # BLD AUTO: 0.1 K/UL — SIGNIFICANT CHANGE UP (ref 0–0.5)
EOSINOPHIL NFR BLD AUTO: 1.4 % — SIGNIFICANT CHANGE UP (ref 0–6)
HCT VFR BLD CALC: 51.3 % — HIGH (ref 39–50)
HGB BLD-MCNC: 16.7 G/DL — SIGNIFICANT CHANGE UP (ref 13–17)
LYMPHOCYTES # BLD AUTO: 0.9 K/UL — LOW (ref 1–3.3)
LYMPHOCYTES # BLD AUTO: 12.2 % — LOW (ref 13–44)
MCHC RBC-ENTMCNC: 29.2 PG — SIGNIFICANT CHANGE UP (ref 27–34)
MCHC RBC-ENTMCNC: 32.5 G/DL — SIGNIFICANT CHANGE UP (ref 32–36)
MCV RBC AUTO: 90 FL — SIGNIFICANT CHANGE UP (ref 80–100)
MONOCYTES # BLD AUTO: 0.7 K/UL — SIGNIFICANT CHANGE UP (ref 0–0.9)
MONOCYTES NFR BLD AUTO: 8.7 % — SIGNIFICANT CHANGE UP (ref 2–14)
NEUTROPHILS # BLD AUTO: 5.8 K/UL — SIGNIFICANT CHANGE UP (ref 1.8–7.4)
NEUTROPHILS NFR BLD AUTO: 76.9 % — SIGNIFICANT CHANGE UP (ref 43–77)
PLATELET # BLD AUTO: 216 K/UL — SIGNIFICANT CHANGE UP (ref 150–400)
RBC # BLD: 5.7 M/UL — SIGNIFICANT CHANGE UP (ref 4.2–5.8)
RBC # FLD: 13.7 % — SIGNIFICANT CHANGE UP (ref 10.3–14.5)
WBC # BLD: 7.5 K/UL — SIGNIFICANT CHANGE UP (ref 3.8–10.5)
WBC # FLD AUTO: 7.5 K/UL — SIGNIFICANT CHANGE UP (ref 3.8–10.5)

## 2020-08-07 PROCEDURE — 99215 OFFICE O/P EST HI 40 MIN: CPT

## 2020-08-07 RX ORDER — METOPROLOL TARTRATE 75 MG/1
TABLET, FILM COATED ORAL
Refills: 0 | Status: DISCONTINUED | COMMUNITY
End: 2020-08-07

## 2020-08-07 RX ORDER — SITAGLIPTIN 50 MG/1
50 TABLET, FILM COATED ORAL
Qty: 90 | Refills: 0 | Status: DISCONTINUED | COMMUNITY
Start: 2020-03-10

## 2020-08-07 RX ORDER — SITAGLIPTIN 100 MG/1
100 TABLET, FILM COATED ORAL
Refills: 0 | Status: DISCONTINUED | COMMUNITY
End: 2020-08-07

## 2020-08-07 RX ORDER — METOPROLOL SUCCINATE 100 MG/1
100 TABLET, EXTENDED RELEASE ORAL
Qty: 90 | Refills: 0 | Status: ACTIVE | COMMUNITY
Start: 2020-06-10

## 2020-08-11 LAB
ALBUMIN SERPL ELPH-MCNC: 4.8 G/DL
ALP BLD-CCNC: 85 U/L
ALT SERPL-CCNC: 21 U/L
ANION GAP SERPL CALC-SCNC: 13 MMOL/L
APTT BLD: 39.1 SEC
AST SERPL-CCNC: 18 U/L
BILIRUB SERPL-MCNC: 0.4 MG/DL
BUN SERPL-MCNC: 20 MG/DL
CALCIUM SERPL-MCNC: 9.6 MG/DL
CHLORIDE SERPL-SCNC: 100 MMOL/L
CO2 SERPL-SCNC: 26 MMOL/L
CREAT SERPL-MCNC: 1.55 MG/DL
HBV CORE IGM SER QL: NONREACTIVE
HBV SURFACE AB SER QL: NONREACTIVE
HBV SURFACE AB SERPL IA-ACNC: <3 MIU/ML
HBV SURFACE AG SER QL: NONREACTIVE
HCV AB SER QL: NONREACTIVE
HCV S/CO RATIO: 0.07 S/CO
INR PPP: 1.73 RATIO
POTASSIUM SERPL-SCNC: 5 MMOL/L
PROT SERPL-MCNC: 7 G/DL
PT BLD: 19.9 SEC
SODIUM SERPL-SCNC: 139 MMOL/L

## 2020-08-12 NOTE — CONSULT LETTER
[Dear  ___] : Dear  [unfilled], [Courtesy Letter:] : I had the pleasure of seeing your patient, [unfilled], in my office today. [Please see my note below.] : Please see my note below. [Sincerely,] : Sincerely, [DrElo  ___] : Dr. POLLOCK [DrElo ___] : Dr. POLLOCK [FreeTextEntry3] : Isak Zhu MD\par Medical Oncology/Hematology\par [facility] [place], [facility]\par \par \par [facility] at [facility]\par

## 2020-08-12 NOTE — ASSESSMENT
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 No adjuvant chemotherapy as he was poor Cisplatin candidate due to partial solitary kidney. \par \par Recent surveillance CT chest with multiple new metastatic lesions (para-aortic nodes), destructive lesion of the right 8th rib, 4 cm indistinct lesion within R lobe of the liver suspicious for metastatic disease - likely metastatic squamous cell lung as it was poorly differentiated.\par \par Treatment for stage IV lung cancer include Carbo / Taxol / Pembrolizumab every 3 weeks x 4 cycles, followed by pembrolizumab maintenance.  He will also be a candidate for Xgeva for bone metastasis. \par \par PLan:\par PET CT and brain MRI ASAP\par Plan for biopsy for confirmation after PET\par Will request PDL1 testing on lung path from 2019\par Neoplasm related pain - oxycodone 5 mg q 6 hrs prn\par Follow up in 2 weeks to finalize a plan of care

## 2020-08-12 NOTE — HISTORY OF PRESENT ILLNESS
[de-identified] : 80 year old gentleman diagnosed with synchronous H&N and lung cancer. He also has a history of RCC, s/p left total nephrectomy and s/p right partial nephrectomy for ?cyst. 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 Completed 6300 cGy radiation therapy to larynx on 1/2/2020. 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  No adjuvant therapy as he was not a candidate for Cisplatin due to solitary partial kidney, and no clear role for carboplatin based therapy in the adjuvant setting. \par \par \par On surveillance CT chest on 8/3/20 - IMPRESSION:\par Enlarging para-aortic lymph nodes suspicious for metastatic disease.\par Lytic destructive lesion within the right 8th rib (series 6 image 73) likely metastatic.\par 4 cm indistinct hypodense lesion within the right lobe of the liver, suspicious for metastatic disease. Further evaluation can be performed with PET-CT or MRI.\par \par He notes left sided chest wall pain since April 2020.\par No significant weight loss. NO SOB. NO headaches, or dizziness.  No changes in vision.\par

## 2020-08-12 NOTE — PHYSICAL EXAM
[Normal] : affect appropriate [de-identified] : supple [de-identified] : no edema [de-identified] : S1/S2 [de-identified] : CTA b/l [de-identified] : soft, NT, nD

## 2020-08-18 ENCOUNTER — APPOINTMENT (OUTPATIENT)
Dept: MRI IMAGING | Facility: CLINIC | Age: 80
End: 2020-08-18
Payer: MEDICARE

## 2020-08-18 ENCOUNTER — OUTPATIENT (OUTPATIENT)
Dept: OUTPATIENT SERVICES | Facility: HOSPITAL | Age: 80
LOS: 1 days | End: 2020-08-18

## 2020-08-18 ENCOUNTER — APPOINTMENT (OUTPATIENT)
Dept: NUCLEAR MEDICINE | Facility: CLINIC | Age: 80
End: 2020-08-18
Payer: MEDICARE

## 2020-08-18 DIAGNOSIS — C34.90 MALIGNANT NEOPLASM OF UNSPECIFIED PART OF UNSPECIFIED BRONCHUS OR LUNG: ICD-10-CM

## 2020-08-18 DIAGNOSIS — Z98.890 OTHER SPECIFIED POSTPROCEDURAL STATES: Chronic | ICD-10-CM

## 2020-08-18 PROCEDURE — 78815 PET IMAGE W/CT SKULL-THIGH: CPT | Mod: 26,PI

## 2020-08-18 PROCEDURE — 70553 MRI BRAIN STEM W/O & W/DYE: CPT | Mod: 26

## 2020-08-31 RX ORDER — OXYCODONE 5 MG/1
5 TABLET ORAL
Qty: 30 | Refills: 0 | Status: DISCONTINUED | COMMUNITY
Start: 2020-08-07 | End: 2020-08-31

## 2020-09-01 ENCOUNTER — OUTPATIENT (OUTPATIENT)
Dept: OUTPATIENT SERVICES | Facility: HOSPITAL | Age: 80
LOS: 1 days | End: 2020-09-01
Payer: MEDICARE

## 2020-09-01 VITALS
WEIGHT: 190.92 LBS | RESPIRATION RATE: 16 BRPM | TEMPERATURE: 97 F | HEIGHT: 69 IN | DIASTOLIC BLOOD PRESSURE: 92 MMHG | HEART RATE: 80 BPM | SYSTOLIC BLOOD PRESSURE: 162 MMHG

## 2020-09-01 DIAGNOSIS — Z01.818 ENCOUNTER FOR OTHER PREPROCEDURAL EXAMINATION: ICD-10-CM

## 2020-09-01 DIAGNOSIS — Z98.890 OTHER SPECIFIED POSTPROCEDURAL STATES: Chronic | ICD-10-CM

## 2020-09-01 DIAGNOSIS — C34.90 MALIGNANT NEOPLASM OF UNSPECIFIED PART OF UNSPECIFIED BRONCHUS OR LUNG: ICD-10-CM

## 2020-09-01 DIAGNOSIS — I48.91 UNSPECIFIED ATRIAL FIBRILLATION: ICD-10-CM

## 2020-09-01 DIAGNOSIS — Z92.3 PERSONAL HISTORY OF IRRADIATION: Chronic | ICD-10-CM

## 2020-09-01 DIAGNOSIS — Z90.2 ACQUIRED ABSENCE OF LUNG [PART OF]: Chronic | ICD-10-CM

## 2020-09-01 DIAGNOSIS — I10 ESSENTIAL (PRIMARY) HYPERTENSION: ICD-10-CM

## 2020-09-01 LAB
ANION GAP SERPL CALC-SCNC: 13 MMOL/L — SIGNIFICANT CHANGE UP (ref 5–17)
APTT BLD: 33.2 SEC — SIGNIFICANT CHANGE UP (ref 27.5–35.5)
BASOPHILS # BLD AUTO: 0.04 K/UL — SIGNIFICANT CHANGE UP (ref 0–0.2)
BASOPHILS NFR BLD AUTO: 0.4 % — SIGNIFICANT CHANGE UP (ref 0–2)
BUN SERPL-MCNC: 18 MG/DL — SIGNIFICANT CHANGE UP (ref 8–20)
CALCIUM SERPL-MCNC: 10.1 MG/DL — SIGNIFICANT CHANGE UP (ref 8.6–10.2)
CHLORIDE SERPL-SCNC: 99 MMOL/L — SIGNIFICANT CHANGE UP (ref 98–107)
CO2 SERPL-SCNC: 28 MMOL/L — SIGNIFICANT CHANGE UP (ref 22–29)
CREAT SERPL-MCNC: 1.37 MG/DL — HIGH (ref 0.5–1.3)
EOSINOPHIL # BLD AUTO: 0.03 K/UL — SIGNIFICANT CHANGE UP (ref 0–0.5)
EOSINOPHIL NFR BLD AUTO: 0.3 % — SIGNIFICANT CHANGE UP (ref 0–6)
GLUCOSE SERPL-MCNC: 161 MG/DL — HIGH (ref 70–99)
HCT VFR BLD CALC: 50.9 % — HIGH (ref 39–50)
HGB BLD-MCNC: 16 G/DL — SIGNIFICANT CHANGE UP (ref 13–17)
IMM GRANULOCYTES NFR BLD AUTO: 0.5 % — SIGNIFICANT CHANGE UP (ref 0–1.5)
INR BLD: 1.47 RATIO — HIGH (ref 0.88–1.16)
LYMPHOCYTES # BLD AUTO: 0.65 K/UL — LOW (ref 1–3.3)
LYMPHOCYTES # BLD AUTO: 6.7 % — LOW (ref 13–44)
MCHC RBC-ENTMCNC: 29.1 PG — SIGNIFICANT CHANGE UP (ref 27–34)
MCHC RBC-ENTMCNC: 31.4 GM/DL — LOW (ref 32–36)
MCV RBC AUTO: 92.7 FL — SIGNIFICANT CHANGE UP (ref 80–100)
MONOCYTES # BLD AUTO: 0.64 K/UL — SIGNIFICANT CHANGE UP (ref 0–0.9)
MONOCYTES NFR BLD AUTO: 6.6 % — SIGNIFICANT CHANGE UP (ref 2–14)
NEUTROPHILS # BLD AUTO: 8.31 K/UL — HIGH (ref 1.8–7.4)
NEUTROPHILS NFR BLD AUTO: 85.5 % — HIGH (ref 43–77)
PLATELET # BLD AUTO: 210 K/UL — SIGNIFICANT CHANGE UP (ref 150–400)
POTASSIUM SERPL-MCNC: 5.6 MMOL/L — HIGH (ref 3.5–5.3)
POTASSIUM SERPL-SCNC: 5.6 MMOL/L — HIGH (ref 3.5–5.3)
PROTHROM AB SERPL-ACNC: 16.7 SEC — HIGH (ref 10.6–13.6)
RBC # BLD: 5.49 M/UL — SIGNIFICANT CHANGE UP (ref 4.2–5.8)
RBC # FLD: 14.5 % — SIGNIFICANT CHANGE UP (ref 10.3–14.5)
SODIUM SERPL-SCNC: 140 MMOL/L — SIGNIFICANT CHANGE UP (ref 135–145)
WBC # BLD: 9.72 K/UL — SIGNIFICANT CHANGE UP (ref 3.8–10.5)
WBC # FLD AUTO: 9.72 K/UL — SIGNIFICANT CHANGE UP (ref 3.8–10.5)

## 2020-09-01 PROCEDURE — U0003: CPT

## 2020-09-01 PROCEDURE — 80048 BASIC METABOLIC PNL TOTAL CA: CPT

## 2020-09-01 PROCEDURE — 36415 COLL VENOUS BLD VENIPUNCTURE: CPT

## 2020-09-01 PROCEDURE — 85027 COMPLETE CBC AUTOMATED: CPT

## 2020-09-01 PROCEDURE — 85610 PROTHROMBIN TIME: CPT

## 2020-09-01 PROCEDURE — 85730 THROMBOPLASTIN TIME PARTIAL: CPT

## 2020-09-01 PROCEDURE — G0463: CPT

## 2020-09-01 RX ORDER — SODIUM CHLORIDE 9 MG/ML
3 INJECTION INTRAMUSCULAR; INTRAVENOUS; SUBCUTANEOUS ONCE
Refills: 0 | Status: DISCONTINUED | OUTPATIENT
Start: 2020-09-03 | End: 2020-09-17

## 2020-09-01 RX ORDER — LANSOPRAZOLE 15 MG/1
1 CAPSULE, DELAYED RELEASE ORAL
Qty: 0 | Refills: 0 | DISCHARGE

## 2020-09-01 RX ORDER — ALPRAZOLAM 0.25 MG
0 TABLET ORAL
Qty: 2 | Refills: 0 | DISCHARGE

## 2020-09-01 RX ORDER — TRAMADOL HYDROCHLORIDE 50 MG/1
0 TABLET ORAL
Qty: 60 | Refills: 0 | DISCHARGE

## 2020-09-01 RX ORDER — SITAGLIPTIN 50 MG/1
1 TABLET, FILM COATED ORAL
Qty: 0 | Refills: 0 | DISCHARGE

## 2020-09-01 NOTE — H&P PST ADULT - ASSESSMENT
80 year old male with history of renal ca s/p left nephrectomy and partial right nephrectomy (cyst), synchronous laryngeal ca ( well differentiated squamous cell ca) s/p XRT and SHELLI poorly differentiated  squamous cell cancer in 11/2019, s/p left upper lobectomy present to PST for liver biopsy . Patient had a recent abnormal chest CT that revealed multiple new metastatic lesions

## 2020-09-01 NOTE — H&P PST ADULT - NSICDXPASTMEDICALHX_GEN_ALL_CORE_FT
PAST MEDICAL HISTORY:  Atrial fibrillation     GERD (gastroesophageal reflux disease)     Gout     Hypertension     Obesity     Other diseases of vocal cords     Other nonspecific abnormal finding of lung field     Reflux     Renal cancer     Type 2 diabetes mellitus PAST MEDICAL HISTORY:  Atrial fibrillation     GERD (gastroesophageal reflux disease)     Gout     Hypertension     Laryngeal squamous cell carcinoma synchronous laryngeal ca  treated with xRT    Lung cancer SHELLI poorly differentiated  squamous cell cancer    Obesity     Other diseases of vocal cords     Reflux     Renal cancer     Type 2 diabetes mellitus currently not on medication

## 2020-09-01 NOTE — ASU PATIENT PROFILE, ADULT - PMH
Atrial fibrillation    GERD (gastroesophageal reflux disease)    Gout    Hypertension    Obesity    Other diseases of vocal cords    Other nonspecific abnormal finding of lung field    Reflux    Renal cancer    Type 2 diabetes mellitus

## 2020-09-01 NOTE — H&P PST ADULT - RS GEN PE MLT RESP DETAILS PC
no rales/good air movement/breath sounds equal/airway patent/diminished breath sounds, L/no rhonchi/no subcutaneous emphysema

## 2020-09-01 NOTE — H&P PST ADULT - NSICDXPROBLEM_GEN_ALL_CORE_FT
PROBLEM DIAGNOSES  Problem: Lung cancer  Assessment and Plan: ultrasound Guided liver biopsy with anesthesia     Problem: HTN (hypertension)  Assessment and Plan: Medical clerance  continue medication at this time     Problem: Afib  Assessment and Plan: hold xarelto 48 hr prior to procedure and resume when safe

## 2020-09-01 NOTE — H&P PST ADULT - HISTORY OF PRESENT ILLNESS
80 year old male with history of renal ca s/p left nephrectomy and partial right nephrectomy (cyst), synchronous laryngeal ca ( well differentiated squamous cell ca) s/p XRT and SHELLI poorly differentiated  squamous cell cancer in 11/2019, s/p left upper lobectomy present to PST for liver biopsy . Patient had a recent abnormal chest CT that revealed multiple new metastatic lesions   On surveillance CT chest on 8/3/20 - IMPRESSION:  Enlarging para-aortic lymph nodes suspicious for metastatic disease.  Lytic destructive lesion within the right 8th rib (series 6 image 73) likely metastatic.  4 cm indistinct hypodense lesion within the right lobe of the liver, suspicious for metastatic disease. Further evaluation can be performed with PET-CT or MRI.

## 2020-09-01 NOTE — H&P PST ADULT - NEGATIVE CARDIOVASCULAR SYMPTOMS
no claudication/no orthopnea/no paroxysmal nocturnal dyspnea/no chest pain/no peripheral edema/no dyspnea on exertion/no palpitations

## 2020-09-01 NOTE — ASU PATIENT PROFILE, ADULT - LEARNING ASSESSMENT (PATIENT) ADDITIONAL COMMENTS
Instructed pt on pre-op instructions/teaching, tips for safer surgery, pain management scale, pre-surgical infection prevention instructions, covid swab done today, NP, Instructed pt on pre-op instructions/teaching, tips for safer surgery, pain management scale, pre-surgical infection prevention instructions, covid swab done today, verbalized understanding of all instructions given by NP.

## 2020-09-01 NOTE — H&P PST ADULT - NEGATIVE ENMT SYMPTOMS
no dysphagia/no sinus symptoms/no nasal congestion/no vertigo/no throat pain/voice disturbance/no hearing difficulty

## 2020-09-01 NOTE — H&P PST ADULT - NSICDXPASTSURGICALHX_GEN_ALL_CORE_FT
PAST SURGICAL HISTORY:  H/O: knee surgery right plate and 3 screws    History of lobectomy of lung left upper lobe    S/P excision of vocal cord nodule     S/P Nephrectomy left, partial right PAST SURGICAL HISTORY:  H/O: knee surgery right plate and 3 screws    History of lobectomy of lung left upper lobe    History of therapeutic radiation     S/P excision of vocal cord nodule     S/P Nephrectomy left, partial right

## 2020-09-02 LAB — SARS-COV-2 RNA SPEC QL NAA+PROBE: SIGNIFICANT CHANGE UP

## 2020-09-03 ENCOUNTER — OUTPATIENT (OUTPATIENT)
Dept: OUTPATIENT SERVICES | Facility: HOSPITAL | Age: 80
LOS: 1 days | End: 2020-09-03
Payer: MEDICARE

## 2020-09-03 ENCOUNTER — RESULT REVIEW (OUTPATIENT)
Age: 80
End: 2020-09-03

## 2020-09-03 VITALS
DIASTOLIC BLOOD PRESSURE: 83 MMHG | HEART RATE: 88 BPM | SYSTOLIC BLOOD PRESSURE: 121 MMHG | RESPIRATION RATE: 16 BRPM | OXYGEN SATURATION: 95 % | TEMPERATURE: 97 F

## 2020-09-03 VITALS
TEMPERATURE: 98 F | RESPIRATION RATE: 20 BRPM | OXYGEN SATURATION: 95 % | WEIGHT: 184.97 LBS | HEART RATE: 76 BPM | DIASTOLIC BLOOD PRESSURE: 86 MMHG | SYSTOLIC BLOOD PRESSURE: 134 MMHG | HEIGHT: 69 IN

## 2020-09-03 DIAGNOSIS — C34.90 MALIGNANT NEOPLASM OF UNSPECIFIED PART OF UNSPECIFIED BRONCHUS OR LUNG: ICD-10-CM

## 2020-09-03 DIAGNOSIS — Z98.890 OTHER SPECIFIED POSTPROCEDURAL STATES: Chronic | ICD-10-CM

## 2020-09-03 DIAGNOSIS — Z92.3 PERSONAL HISTORY OF IRRADIATION: Chronic | ICD-10-CM

## 2020-09-03 DIAGNOSIS — Z90.2 ACQUIRED ABSENCE OF LUNG [PART OF]: Chronic | ICD-10-CM

## 2020-09-03 LAB
APTT BLD: 33.6 SEC — SIGNIFICANT CHANGE UP (ref 27.5–35.5)
GLUCOSE BLDC GLUCOMTR-MCNC: 117 MG/DL — HIGH (ref 70–99)
INR BLD: 1.24 RATIO — HIGH (ref 0.88–1.16)
POTASSIUM SERPL-MCNC: 4.4 MMOL/L — SIGNIFICANT CHANGE UP (ref 3.5–5.3)
POTASSIUM SERPL-SCNC: 4.4 MMOL/L — SIGNIFICANT CHANGE UP (ref 3.5–5.3)
PROTHROM AB SERPL-ACNC: 14.2 SEC — HIGH (ref 10.6–13.6)

## 2020-09-03 PROCEDURE — 88342 IMHCHEM/IMCYTCHM 1ST ANTB: CPT

## 2020-09-03 PROCEDURE — 47000 NEEDLE BIOPSY OF LIVER PERQ: CPT

## 2020-09-03 PROCEDURE — 85730 THROMBOPLASTIN TIME PARTIAL: CPT

## 2020-09-03 PROCEDURE — 36415 COLL VENOUS BLD VENIPUNCTURE: CPT

## 2020-09-03 PROCEDURE — 88342 IMHCHEM/IMCYTCHM 1ST ANTB: CPT | Mod: 26

## 2020-09-03 PROCEDURE — 77012 CT SCAN FOR NEEDLE BIOPSY: CPT | Mod: 26

## 2020-09-03 PROCEDURE — 88341 IMHCHEM/IMCYTCHM EA ADD ANTB: CPT | Mod: 26

## 2020-09-03 PROCEDURE — 88341 IMHCHEM/IMCYTCHM EA ADD ANTB: CPT

## 2020-09-03 PROCEDURE — 88307 TISSUE EXAM BY PATHOLOGIST: CPT

## 2020-09-03 PROCEDURE — 85610 PROTHROMBIN TIME: CPT

## 2020-09-03 PROCEDURE — 84132 ASSAY OF SERUM POTASSIUM: CPT

## 2020-09-03 PROCEDURE — 77012 CT SCAN FOR NEEDLE BIOPSY: CPT

## 2020-09-03 PROCEDURE — 88333 PATH CONSLTJ SURG CYTO XM 1: CPT

## 2020-09-03 PROCEDURE — 88333 PATH CONSLTJ SURG CYTO XM 1: CPT | Mod: 26

## 2020-09-03 PROCEDURE — 88307 TISSUE EXAM BY PATHOLOGIST: CPT | Mod: 26

## 2020-09-03 PROCEDURE — 82962 GLUCOSE BLOOD TEST: CPT

## 2020-09-03 RX ORDER — ALLOPURINOL 300 MG
1 TABLET ORAL
Qty: 0 | Refills: 0 | DISCHARGE

## 2020-09-03 RX ORDER — CEFAZOLIN SODIUM 1 G
2000 VIAL (EA) INJECTION ONCE
Refills: 0 | Status: DISCONTINUED | OUTPATIENT
Start: 2020-09-03 | End: 2020-09-03

## 2020-09-03 RX ORDER — ONDANSETRON 8 MG/1
4 TABLET, FILM COATED ORAL ONCE
Refills: 0 | Status: COMPLETED | OUTPATIENT
Start: 2020-09-03 | End: 2020-09-03

## 2020-09-03 RX ORDER — ACETAMINOPHEN 500 MG
975 TABLET ORAL ONCE
Refills: 0 | Status: DISCONTINUED | OUTPATIENT
Start: 2020-09-03 | End: 2020-09-17

## 2020-09-03 RX ADMIN — ONDANSETRON 4 MILLIGRAM(S): 8 TABLET, FILM COATED ORAL at 13:41

## 2020-09-09 LAB — SURGICAL PATHOLOGY STUDY: SIGNIFICANT CHANGE UP

## 2020-09-14 ENCOUNTER — OUTPATIENT (OUTPATIENT)
Dept: OUTPATIENT SERVICES | Facility: HOSPITAL | Age: 80
LOS: 1 days | Discharge: HOME | End: 2020-09-14

## 2020-09-14 ENCOUNTER — APPOINTMENT (OUTPATIENT)
Dept: OTOLARYNGOLOGY | Facility: CLINIC | Age: 80
End: 2020-09-14
Payer: MEDICARE

## 2020-09-14 VITALS — SYSTOLIC BLOOD PRESSURE: 156 MMHG | HEART RATE: 105 BPM | DIASTOLIC BLOOD PRESSURE: 101 MMHG

## 2020-09-14 DIAGNOSIS — Z98.890 OTHER SPECIFIED POSTPROCEDURAL STATES: Chronic | ICD-10-CM

## 2020-09-14 DIAGNOSIS — Z92.3 PERSONAL HISTORY OF IRRADIATION: Chronic | ICD-10-CM

## 2020-09-14 DIAGNOSIS — Z90.2 ACQUIRED ABSENCE OF LUNG [PART OF]: Chronic | ICD-10-CM

## 2020-09-14 DIAGNOSIS — C34.90 MALIGNANT NEOPLASM OF UNSPECIFIED PART OF UNSPECIFIED BRONCHUS OR LUNG: ICD-10-CM

## 2020-09-14 PROCEDURE — 99214 OFFICE O/P EST MOD 30 MIN: CPT | Mod: 25

## 2020-09-14 PROCEDURE — 31575 DIAGNOSTIC LARYNGOSCOPY: CPT

## 2020-09-14 NOTE — PROCEDURE
[None] : none [Flexible Endoscope] : examined with the flexible endoscope [Serial Number: ___] : Serial Number: [unfilled] [de-identified] : No lesions in the NPx, OPx, HPx or larynx.  Stable posttreatment changes, VC are mobile, airway patent.\par  [de-identified] : Glottic SCCa

## 2020-09-14 NOTE — PHYSICAL EXAM
[Laryngoscopy Performed] : laryngoscopy was performed, see procedure section for findings [Normal] : cranial nerves 2-12 intact [FreeTextEntry1] : No concerning lesions in the OC/OPx on inspection or palpation.\par

## 2020-09-14 NOTE — CONSULT LETTER
[Please see my note below.] : Please see my note below. [Courtesy Letter:] : I had the pleasure of seeing your patient, [unfilled], in my office today. [Dear  ___] : Dear  [unfilled], [Consult Closing:] : Thank you very much for allowing me to participate in the care of this patient.  If you have any questions, please do not hesitate to contact me. [Sincerely,] : Sincerely, [FreeTextEntry2] : Dr. Pablo Paz\[address],\[place] [address]  [FreeTextEntry3] : Dev

## 2020-09-14 NOTE — HISTORY OF PRESENT ILLNESS
[de-identified] : 81 y/o male who completed radiation tx for VC SCCa with Dr. Loaiza on 1/2/2020 is here for f/up, pt has no c.o in the throat area,but the lung cancer return possible mets to the liver, liver bx 9/3/2020 and will f/u with his oncology tomorrow.  Pt has mild sob but denies dysphonia, dysphagia, pain, otalgia.  His weight is stable.  He denies any hemoptysis.  He reports pain along the left surgical site in the chest and along his back.\par \par

## 2020-09-15 ENCOUNTER — OUTPATIENT (OUTPATIENT)
Dept: OUTPATIENT SERVICES | Facility: HOSPITAL | Age: 80
LOS: 1 days | Discharge: HOME | End: 2020-09-15
Payer: MEDICARE

## 2020-09-15 ENCOUNTER — APPOINTMENT (OUTPATIENT)
Dept: HEMATOLOGY ONCOLOGY | Facility: CLINIC | Age: 80
End: 2020-09-15
Payer: MEDICARE

## 2020-09-15 ENCOUNTER — RESULT REVIEW (OUTPATIENT)
Age: 80
End: 2020-09-15

## 2020-09-15 VITALS
SYSTOLIC BLOOD PRESSURE: 136 MMHG | WEIGHT: 194.01 LBS | DIASTOLIC BLOOD PRESSURE: 85 MMHG | OXYGEN SATURATION: 94 % | HEIGHT: 70 IN | BODY MASS INDEX: 27.77 KG/M2 | HEART RATE: 82 BPM | TEMPERATURE: 97.3 F

## 2020-09-15 DIAGNOSIS — Z98.890 OTHER SPECIFIED POSTPROCEDURAL STATES: Chronic | ICD-10-CM

## 2020-09-15 DIAGNOSIS — Z92.3 PERSONAL HISTORY OF IRRADIATION: Chronic | ICD-10-CM

## 2020-09-15 DIAGNOSIS — Z90.2 ACQUIRED ABSENCE OF LUNG [PART OF]: Chronic | ICD-10-CM

## 2020-09-15 LAB
BASOPHILS # BLD AUTO: 0.1 K/UL — SIGNIFICANT CHANGE UP (ref 0–0.2)
BASOPHILS NFR BLD AUTO: 0.7 % — SIGNIFICANT CHANGE UP (ref 0–2)
EOSINOPHIL # BLD AUTO: 0.1 K/UL — SIGNIFICANT CHANGE UP (ref 0–0.5)
EOSINOPHIL NFR BLD AUTO: 1.8 % — SIGNIFICANT CHANGE UP (ref 0–6)
HCT VFR BLD CALC: 47.4 % — SIGNIFICANT CHANGE UP (ref 39–50)
HGB BLD-MCNC: 15.4 G/DL — SIGNIFICANT CHANGE UP (ref 13–17)
LYMPHOCYTES # BLD AUTO: 1.1 K/UL — SIGNIFICANT CHANGE UP (ref 1–3.3)
LYMPHOCYTES # BLD AUTO: 14.9 % — SIGNIFICANT CHANGE UP (ref 13–44)
MCHC RBC-ENTMCNC: 29.5 PG — SIGNIFICANT CHANGE UP (ref 27–34)
MCHC RBC-ENTMCNC: 32.6 G/DL — SIGNIFICANT CHANGE UP (ref 32–36)
MCV RBC AUTO: 90.6 FL — SIGNIFICANT CHANGE UP (ref 80–100)
MONOCYTES # BLD AUTO: 0.7 K/UL — SIGNIFICANT CHANGE UP (ref 0–0.9)
MONOCYTES NFR BLD AUTO: 10.4 % — SIGNIFICANT CHANGE UP (ref 2–14)
NEUTROPHILS # BLD AUTO: 5.2 K/UL — SIGNIFICANT CHANGE UP (ref 1.8–7.4)
NEUTROPHILS NFR BLD AUTO: 72.2 % — SIGNIFICANT CHANGE UP (ref 43–77)
PLATELET # BLD AUTO: 232 K/UL — SIGNIFICANT CHANGE UP (ref 150–400)
RBC # BLD: 5.24 M/UL — SIGNIFICANT CHANGE UP (ref 4.2–5.8)
RBC # FLD: 13.8 % — SIGNIFICANT CHANGE UP (ref 10.3–14.5)
WBC # BLD: 7.2 K/UL — SIGNIFICANT CHANGE UP (ref 3.8–10.5)
WBC # FLD AUTO: 7.2 K/UL — SIGNIFICANT CHANGE UP (ref 3.8–10.5)

## 2020-09-15 PROCEDURE — 99214 OFFICE O/P EST MOD 30 MIN: CPT

## 2020-09-15 NOTE — ASSESSMENT
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 No adjuvant chemotherapy as he was poor Cisplatin candidate due to partial solitary kidney. \par PDL1 0% () on original path. \par \par Reviewed brain MRI and PET CT results with patient.\par Liver biopsy confirms metastatic squamous cell carcinoma. No evidence of recurrence in head & neck region.\par Treatment for stage IV lung cancer include Carbo / Taxol / Pembrolizumab every 3 weeks x 4 cycles, followed by pembrolizumab maintenance.  He will also be a candidate for Xgeva for bone metastasis. \par \par PLan:\par Carbo AUC 5, Taxol 160 mg/m2, and pembrolizumab every 3 weeks x 4 cycles, followed by maintenance pembrolizumab if there is no POD.\par Neoplasm related pain - lidocaine patch renewed for left 8th rib metastasis, advised to try oxycodone 7.5 mg to see if it's less sedating than 10 mg.  Rad-onc follow up for palliative RT to L 8th rib\par Follow up 2 weeks post cycle 1

## 2020-09-15 NOTE — REVIEW OF SYSTEMS
[FreeTextEntry2] : negative except as reviewed in interval history [FreeTextEntry9] : left sided chest wall pain

## 2020-09-15 NOTE — PHYSICAL EXAM
[Normal] : affect appropriate [de-identified] : supple [de-identified] : CTA b/l [de-identified] : S1/S2 [de-identified] : soft, NT, nD [de-identified] : no edema

## 2020-09-15 NOTE — CONSULT LETTER
[Dear  ___] : Dear  [unfilled], [Courtesy Letter:] : I had the pleasure of seeing your patient, [unfilled], in my office today. [Please see my note below.] : Please see my note below. [Sincerely,] : Sincerely, [DrElo  ___] : Dr. POLLOCK [DrElo ___] : Dr. POLLOCK [FreeTextEntry3] : Isak Zhu MD\par Medical Oncology/Hematology\par [facility] [place], [facility]\par \par \par [facility] [employer] [facility] at [facility]\par

## 2020-09-16 PROBLEM — C32.9 MALIGNANT NEOPLASM OF LARYNX, UNSPECIFIED: Chronic | Status: ACTIVE | Noted: 2020-09-01

## 2020-09-16 PROBLEM — C34.90 MALIGNANT NEOPLASM OF UNSPECIFIED PART OF UNSPECIFIED BRONCHUS OR LUNG: Chronic | Status: ACTIVE | Noted: 2020-09-01

## 2020-09-16 PROBLEM — E11.9 TYPE 2 DIABETES MELLITUS WITHOUT COMPLICATIONS: Chronic | Status: ACTIVE | Noted: 2019-08-06

## 2020-09-16 PROBLEM — J38.3 OTHER DISEASES OF VOCAL CORDS: Chronic | Status: ACTIVE | Noted: 2019-08-06

## 2020-09-16 LAB
ALBUMIN SERPL ELPH-MCNC: 4.4 G/DL
ALP BLD-CCNC: 101 U/L
ALT SERPL-CCNC: 18 U/L
ANION GAP SERPL CALC-SCNC: 13 MMOL/L
AST SERPL-CCNC: 14 U/L
BILIRUB SERPL-MCNC: 0.3 MG/DL
BUN SERPL-MCNC: 22 MG/DL
CALCIUM SERPL-MCNC: 9.8 MG/DL
CHLORIDE SERPL-SCNC: 102 MMOL/L
CO2 SERPL-SCNC: 26 MMOL/L
CREAT SERPL-MCNC: 1.61 MG/DL
POTASSIUM SERPL-SCNC: 4.8 MMOL/L
PROT SERPL-MCNC: 6.7 G/DL
SODIUM SERPL-SCNC: 141 MMOL/L

## 2020-09-16 RX ORDER — PROCHLORPERAZINE MALEATE 10 MG/1
10 TABLET ORAL EVERY 6 HOURS
Qty: 28 | Refills: 2 | Status: ACTIVE | COMMUNITY
Start: 2020-09-16 | End: 1900-01-01

## 2020-09-16 RX ORDER — ONDANSETRON 8 MG/1
8 TABLET ORAL
Qty: 30 | Refills: 5 | Status: ACTIVE | COMMUNITY
Start: 2020-09-16 | End: 1900-01-01

## 2020-09-17 ENCOUNTER — APPOINTMENT (OUTPATIENT)
Dept: RADIATION ONCOLOGY | Facility: CLINIC | Age: 80
End: 2020-09-17
Payer: MEDICARE

## 2020-09-17 VITALS — RESPIRATION RATE: 18 BRPM | HEART RATE: 72 BPM | OXYGEN SATURATION: 98 %

## 2020-09-17 DIAGNOSIS — T45.1X5A NAUSEA WITH VOMITING, UNSPECIFIED: ICD-10-CM

## 2020-09-17 DIAGNOSIS — R11.2 NAUSEA WITH VOMITING, UNSPECIFIED: ICD-10-CM

## 2020-09-17 PROCEDURE — 99215 OFFICE O/P EST HI 40 MIN: CPT

## 2020-09-17 PROCEDURE — 77263 THER RADIOLOGY TX PLNG CPLX: CPT

## 2020-09-17 PROCEDURE — 77290 THER RAD SIMULAJ FIELD CPLX: CPT | Mod: 26

## 2020-09-17 PROCEDURE — 77334 RADIATION TREATMENT AID(S): CPT | Mod: 26

## 2020-09-17 RX ORDER — PROCHLORPERAZINE MALEATE 10 MG/1
10 TABLET ORAL
Qty: 45 | Refills: 3 | Status: DISCONTINUED | COMMUNITY
Start: 2020-09-17 | End: 2020-09-17

## 2020-09-17 RX ORDER — ONDANSETRON 8 MG/1
8 TABLET ORAL
Qty: 15 | Refills: 3 | Status: DISCONTINUED | COMMUNITY
Start: 2020-09-17 | End: 2020-09-17

## 2020-09-17 NOTE — DISEASE MANAGEMENT
[de-identified] : 6,300 cGy [de-identified] : Larynx [FreeTextEntry4] : squamous cell carcinoma [TTNM] : - [NTNM] : - [MTNM] : 1 [IV] : IV

## 2020-09-17 NOTE — PHYSICAL EXAM
[Extraocular Movements] : extraocular movements were intact [Outer Ear] : the ears and nose were normal in appearance [] : no respiratory distress [Respiration, Rhythm And Depth] : normal respiratory rhythm and effort [Exaggerated Use Of Accessory Muscles For Inspiration] : no accessory muscle use [Heart Rate And Rhythm] : heart rate and rhythm were normal [Normal] : oriented to person, place and time, the affect was normal, the mood was normal and not anxious [de-identified] : tenderness along upper T-spine [de-identified] : tenderness to palpation left inferior scapula to lateral chest

## 2020-09-17 NOTE — HISTORY OF PRESENT ILLNESS
[FreeTextEntry1] : This 79 y/o man is being seen for emergent follow-up for palliative radiation therapy to the left 8th rib and T1 spinous process.  He  presents with extreme nausea and vomiting d/t taking 1 1/2 oxycodone pills.  He reports severe pain in the left lateral rib area as well as his upper spine/neck area which is constant and prevents him from lying down.  He also reports he was taking 12 - 15 Tylenol tablets daily. \par \par Mr. Zarate has a significant smoking history (120 pack years) and a remote history of kidney cancer, s/p left total nephrectomy and partial right nephrectomy.  Patient is s/p wedge resection of SHELLI, completion left upper lobectomy, MLND on 10/21/19 for pulmonary nodule.  Pathology -- T1bN1 poorly differentiated squamous cell carcinoma.   No adjuvant chemotherapy was given -- poor Cisplatin candidate due to partial solitary kidney. 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 Reported dysgeusia, xerostomia improved, excessive mucus production is less and does not keep him awake at night.  Tolerates regular diet.  He had developed intermittent left chest wall pain , at times requiring narcotic analgesia at night over 4 months.  Axiial imaging of the chest has not revealed any abnormalities except remote surgical changes and degenerative changes in the spine.\par \par Follows with Dr. Zhu.\par \par Interval history:\par \par CT scan chest 8/3/2020:\par Enlarging para-aortic lymph nodes suspicious for metastatic disease.  Lytic destructive lesion within the right 8th rib likely metastatic. 4 cm indistinct hypodense lesion within the right lobe of the liver, suspicious for metastatic disease.\par \par Liver core biopsy 9/3/20 - metastatic carcinoma, poorly differentiated, favoring squamous cell carcinoma\par He has back pain and left sided lateral chest wall pain.\par Notes he takes 1 oxycodone and denies benefit, he takes 2 and feels groggy.\par Tramadol was tried but made him throw up.\par Using lidocaine patch to some effect to left lateral chest wall. \par \par 8/18/20 MRI brain - negative for mets\par \par 8/18/20 PET - IMPRESSION -- since PET/CT August 29, 2019:\par 1. New FDG avid hepatic lesions suspicious for metastatic disease.\par 2. Destructive lytic lesion left eighth rib and additional lesions left ninth rib and T1 spinous process, suspicious for metastatic disease.\par 3. FDG avid prevascular lymph nodes probably metastatic.\par 4. No definite FDG avid recurrent disease in the head and neck.\par 5. Too small to characterize 0.7 cm right upper lobe pulmonary nodule. Postsurgical changes of left upper lobectomy.\par \par Liver core biopsy 9/3/20 - metastatic carcinoma, poorly differentiated, favoring squamous cell carcinoma\par He has back pain and left sided lateral chest wall pain.\par Notes he takes 1 oxycodone and denies benefit, he takes 2 and feels groggy.\par Tramadol was tried but made him throw up.\par Using lidocaine patch to some effect to left lateral chest wall. \par \par Per Dr. Zhu's plan:  Carbo AUC 5, Taxol 160 mg/m2, and pembrolizumab every 3 weeks x 4 cycles, followed by maintenance pembrolizumab if there is no POD.  Neoplasm related pain - lidocaine patch renewed for left 8th rib metastasis, advised to try oxycodone 7.5 mg to see if it's less sedating, then 10 mg.

## 2020-09-17 NOTE — LETTER CLOSING
[FreeTextEntry3] : Dominick Loaiza MD\par Physician in Chief\par Department of Radiation Medicine\par [facility] [place] [facility] [place]\par [facility] [place]\par \par  of Radiation Medicine\par [facility]\par at  hospitals/[facility] [place] [facility]\par \par Radiation \par [facility]/\par [facility] [place] [facility] [place]\par [address]\par [place], [place] [address]\par \par Tel: [phone]\par Fax: [phone]\par \par

## 2020-09-17 NOTE — REVIEW OF SYSTEMS
[FreeTextEntry4] : persistent xerostomia and dysgeusia (minimal) [FreeTextEntry6] : s/p left upper lobectomy for squamous cell carcinoma [FreeTextEntry8] : history of kidney cancer -- left total nephrectomy, right partial nephrectomy [FreeTextEntry9] : intermittent left chest wall ache, at times disruptive of sleep.

## 2020-09-17 NOTE — ASSESSMENT
[FreeTextEntry1] : modest resolving treatment related sequelae. Left chest wall ache likely musculoskeletal in etiology.

## 2020-09-17 NOTE — REASON FOR VISIT
[Emergent Follow-Up] : an emergent follow-up visit for [Lung Cancer] : lung cancer [Family Member] : family member

## 2020-09-21 ENCOUNTER — LABORATORY RESULT (OUTPATIENT)
Age: 80
End: 2020-09-21

## 2020-09-21 ENCOUNTER — APPOINTMENT (OUTPATIENT)
Dept: HEMATOLOGY ONCOLOGY | Facility: CLINIC | Age: 80
End: 2020-09-21

## 2020-09-21 ENCOUNTER — APPOINTMENT (OUTPATIENT)
Age: 80
End: 2020-09-21

## 2020-09-21 ENCOUNTER — RESULT REVIEW (OUTPATIENT)
Age: 80
End: 2020-09-21

## 2020-09-21 LAB
BASOPHILS # BLD AUTO: 0.1 K/UL — SIGNIFICANT CHANGE UP (ref 0–0.2)
BASOPHILS NFR BLD AUTO: 0.9 % — SIGNIFICANT CHANGE UP (ref 0–2)
EOSINOPHIL # BLD AUTO: 0.1 K/UL — SIGNIFICANT CHANGE UP (ref 0–0.5)
EOSINOPHIL NFR BLD AUTO: 1.3 % — SIGNIFICANT CHANGE UP (ref 0–6)
HCT VFR BLD CALC: 45.3 % — SIGNIFICANT CHANGE UP (ref 39–50)
HGB BLD-MCNC: 15 G/DL — SIGNIFICANT CHANGE UP (ref 13–17)
LYMPHOCYTES # BLD AUTO: 1 K/UL — SIGNIFICANT CHANGE UP (ref 1–3.3)
LYMPHOCYTES # BLD AUTO: 11.8 % — LOW (ref 13–44)
MCHC RBC-ENTMCNC: 29.7 PG — SIGNIFICANT CHANGE UP (ref 27–34)
MCHC RBC-ENTMCNC: 33 G/DL — SIGNIFICANT CHANGE UP (ref 32–36)
MCV RBC AUTO: 89.8 FL — SIGNIFICANT CHANGE UP (ref 80–100)
MONOCYTES # BLD AUTO: 0.8 K/UL — SIGNIFICANT CHANGE UP (ref 0–0.9)
MONOCYTES NFR BLD AUTO: 9.4 % — SIGNIFICANT CHANGE UP (ref 2–14)
NEUTROPHILS # BLD AUTO: 6.5 K/UL — SIGNIFICANT CHANGE UP (ref 1.8–7.4)
NEUTROPHILS NFR BLD AUTO: 76.5 % — SIGNIFICANT CHANGE UP (ref 43–77)
PLATELET # BLD AUTO: 207 K/UL — SIGNIFICANT CHANGE UP (ref 150–400)
RBC # BLD: 5.04 M/UL — SIGNIFICANT CHANGE UP (ref 4.2–5.8)
RBC # FLD: 12.8 % — SIGNIFICANT CHANGE UP (ref 10.3–14.5)
WBC # BLD: 8.6 K/UL — SIGNIFICANT CHANGE UP (ref 3.8–10.5)
WBC # FLD AUTO: 8.6 K/UL — SIGNIFICANT CHANGE UP (ref 3.8–10.5)

## 2020-09-21 PROCEDURE — 93010 ELECTROCARDIOGRAM REPORT: CPT

## 2020-09-21 PROCEDURE — 77307 TELETHX ISODOSE PLAN CPLX: CPT | Mod: 26

## 2020-09-21 PROCEDURE — 77334 RADIATION TREATMENT AID(S): CPT | Mod: 26

## 2020-09-22 DIAGNOSIS — Z51.11 ENCOUNTER FOR ANTINEOPLASTIC CHEMOTHERAPY: ICD-10-CM

## 2020-09-22 DIAGNOSIS — Z51.89 ENCOUNTER FOR OTHER SPECIFIED AFTERCARE: ICD-10-CM

## 2020-09-22 DIAGNOSIS — R11.2 NAUSEA WITH VOMITING, UNSPECIFIED: ICD-10-CM

## 2020-09-24 PROCEDURE — 77280 THER RAD SIMULAJ FIELD SMPL: CPT | Mod: 26

## 2020-09-25 PROCEDURE — 77427 RADIATION TX MANAGEMENT X5: CPT

## 2020-09-28 VITALS
BODY MASS INDEX: 27.49 KG/M2 | HEIGHT: 70 IN | WEIGHT: 192 LBS | SYSTOLIC BLOOD PRESSURE: 138 MMHG | OXYGEN SATURATION: 96 % | DIASTOLIC BLOOD PRESSURE: 85 MMHG | TEMPERATURE: 98 F | HEART RATE: 109 BPM

## 2020-09-28 NOTE — DISEASE MANAGEMENT
[IV] : IV [Clinical] : TNM Stage: c [FreeTextEntry4] : bone metastasis  [TTNM] : - [NTNM] : - [MTNM] : 1 [de-identified] : 800 cGy [de-identified] : 2000 cGy [de-identified] : left 8th rib and C7 - T3

## 2020-09-28 NOTE — PHYSICAL EXAM
[Extraocular Movements] : extraocular movements were intact [Outer Ear] : the ears and nose were normal in appearance [Normal] : oriented to person, place and time, the affect was normal, the mood was normal and not anxious

## 2020-09-28 NOTE — REASON FOR VISIT
[Routine On-Treatment] : a routine on-treatment visit for [Bone Metastasis] : bone metastasis [Spouse] : spouse

## 2020-09-28 NOTE — REVIEW OF SYSTEMS
[Esophagitis: Grade 0] : Esophagitis: Grade 0 [Skin Hyperpigmentation: Grade 0] : Skin Hyperpigmentation: Grade 0 [Dermatitis Radiation: Grade 0] : Dermatitis Radiation: Grade 0

## 2020-09-28 NOTE — VITALS
[Least Pain Intensity: 0/10] : 0/10 [80: Normal activity with effort; some signs or symptoms of disease.] : 80: Normal activity with effort; some signs or symptoms of disease.

## 2020-10-05 ENCOUNTER — APPOINTMENT (OUTPATIENT)
Dept: HEMATOLOGY ONCOLOGY | Facility: CLINIC | Age: 80
End: 2020-10-05
Payer: MEDICARE

## 2020-10-05 ENCOUNTER — RESULT REVIEW (OUTPATIENT)
Age: 80
End: 2020-10-05

## 2020-10-05 ENCOUNTER — OUTPATIENT (OUTPATIENT)
Dept: OUTPATIENT SERVICES | Facility: HOSPITAL | Age: 80
LOS: 1 days | End: 2020-10-05
Payer: MEDICARE

## 2020-10-05 VITALS
DIASTOLIC BLOOD PRESSURE: 85 MMHG | SYSTOLIC BLOOD PRESSURE: 131 MMHG | OXYGEN SATURATION: 96 % | BODY MASS INDEX: 27.35 KG/M2 | TEMPERATURE: 97.2 F | HEART RATE: 97 BPM | WEIGHT: 191.01 LBS | HEIGHT: 70 IN

## 2020-10-05 DIAGNOSIS — Z98.890 OTHER SPECIFIED POSTPROCEDURAL STATES: Chronic | ICD-10-CM

## 2020-10-05 DIAGNOSIS — Z92.3 PERSONAL HISTORY OF IRRADIATION: Chronic | ICD-10-CM

## 2020-10-05 DIAGNOSIS — Z90.2 ACQUIRED ABSENCE OF LUNG [PART OF]: Chronic | ICD-10-CM

## 2020-10-05 DIAGNOSIS — C79.51 SECONDARY MALIGNANT NEOPLASM OF BONE: ICD-10-CM

## 2020-10-05 LAB
BASOPHILS # BLD AUTO: 0.1 K/UL — SIGNIFICANT CHANGE UP (ref 0–0.2)
BASOPHILS NFR BLD AUTO: 0.8 % — SIGNIFICANT CHANGE UP (ref 0–2)
EOSINOPHIL # BLD AUTO: 0.1 K/UL — SIGNIFICANT CHANGE UP (ref 0–0.5)
EOSINOPHIL NFR BLD AUTO: 0.6 % — SIGNIFICANT CHANGE UP (ref 0–6)
HCT VFR BLD CALC: 40.2 % — SIGNIFICANT CHANGE UP (ref 39–50)
HGB BLD-MCNC: 13.4 G/DL — SIGNIFICANT CHANGE UP (ref 13–17)
LYMPHOCYTES # BLD AUTO: 0.7 K/UL — LOW (ref 1–3.3)
LYMPHOCYTES # BLD AUTO: 5.8 % — LOW (ref 13–44)
MCHC RBC-ENTMCNC: 29.3 PG — SIGNIFICANT CHANGE UP (ref 27–34)
MCHC RBC-ENTMCNC: 33.2 G/DL — SIGNIFICANT CHANGE UP (ref 32–36)
MCV RBC AUTO: 88.1 FL — SIGNIFICANT CHANGE UP (ref 80–100)
MONOCYTES # BLD AUTO: 0.9 K/UL — SIGNIFICANT CHANGE UP (ref 0–0.9)
MONOCYTES NFR BLD AUTO: 8 % — SIGNIFICANT CHANGE UP (ref 2–14)
NEUTROPHILS # BLD AUTO: 9.5 K/UL — HIGH (ref 1.8–7.4)
NEUTROPHILS NFR BLD AUTO: 84.8 % — HIGH (ref 43–77)
PLATELET # BLD AUTO: 147 K/UL — LOW (ref 150–400)
RBC # BLD: 4.57 M/UL — SIGNIFICANT CHANGE UP (ref 4.2–5.8)
RBC # FLD: 13.9 % — SIGNIFICANT CHANGE UP (ref 10.3–14.5)
WBC # BLD: 11.2 K/UL — HIGH (ref 3.8–10.5)
WBC # FLD AUTO: 11.2 K/UL — HIGH (ref 3.8–10.5)

## 2020-10-05 PROCEDURE — 71046 X-RAY EXAM CHEST 2 VIEWS: CPT | Mod: 26

## 2020-10-05 PROCEDURE — 99214 OFFICE O/P EST MOD 30 MIN: CPT

## 2020-10-05 NOTE — HISTORY OF PRESENT ILLNESS
[de-identified] : 80 year old gentleman diagnosed with synchronous H&N and lung cancer. He also has a history of RCC, s/p left total nephrectomy and s/p right partial nephrectomy for ?cyst. 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 Completed 6300 cGy radiation therapy to larynx on 1/2/2020. 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  No adjuvant therapy as he was not a candidate for Cisplatin due to solitary partial kidney, and no clear role for carboplatin based therapy in the adjuvant setting. \par \par \par On surveillance CT chest on 8/3/20 - IMPRESSION:\par Enlarging para-aortic lymph nodes suspicious for metastatic disease.\par Lytic destructive lesion within the right 8th rib (series 6 image 73) likely metastatic.\par 4 cm indistinct hypodense lesion within the right lobe of the liver, suspicious for metastatic disease. Further evaluation can be performed with PET-CT or MRI.\par \par He notes left sided chest wall pain since April 2020.\par No significant weight loss. NO SOB. NO headaches, or dizziness.  No changes in vision.\par  [de-identified] : S/p C1 chemo/immuno w/ OnPro on 9/21/20. Since last seen, denies fevers, SOB w/ exertion, + cough, thick yellow, no CP has a home monitor, and HR is high 90s- 100, he does not feel any palpitations. Has hx of afib (maintains on Xarelto and metoprolol). appetite is fair, he decreased desire and taste, but is maintaining intake, and notes no wgt loss, no N/V post chemo. + Constipation: resolved w/ laxative, some straining, LBM this AM, no hemorrhoidal bleeding. No diarrhea, no pain/burning w/ urination. No LE edema. Energy level is fair. He is performing all of his ADLs, but is in lounge chair 5 hrs during day. Completed RT to left lateral ribcage and C7-T3 on 10/01/20. On 10/03, while sleeping, rolled over and felt a painful "pop" to Left lateral ribs. Is currently sleeping in recliner.  The remainder of ROS is negative.\par

## 2020-10-05 NOTE — ASSESSMENT
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 No adjuvant chemotherapy as he was poor Cisplatin candidate due to partial solitary kidney. PDL1 0% () on original path. Brain MRI 8/18/20 w/o evidence for metastatic disease. PET CT 8/18/20 sows new FDG avid hepatic lesions suspicious for metastatic disease and destructive lytic lesion left eighth rib and additional lesions left ninth rib and T1 spinous process, suspicious for metastatic disease. Liver core bx 9/03/20 confirms metastatic squamous cell carcinoma. No evidence of recurrence in head & neck region. Treatment for stage IV lung cancer include Carbo AUC 5, Taxol 160 mg/m2, and pembrolizumab every 3 weeks x 4 cycles, followed by maintenance pembrolizumab if there is no POD. Xgeva every 6 weeks for bone metastasis. Completed palliative radiation to the left lateral ribcage and C7-T3 on 10/01/20.\par \par S/p C1 Carbo/Taxol/Pembrolizumab w/ OnPro and Xgeva on 9/21/20, today he is 2 weeks post tx. CBC reviewed: WBC 11.2 (no s/s of infection, likely elevation r/t OnPro). Hgb 13.4,  K, no bruising or bleeding noted. \par On 10/03 while rolling over in bed to get up, felt a "pop" to left lateral rib cage. He gets too drowsy w/ Oxycodone, and is using Lidocaine patch for left lateral rib pain, that was present before this episode. HR 97 today, he does not believe he is drinking enough liquids. O2 sat 96%.\par \par Plan:\par - CXRAY today to r/o rib fx. No palpable dislocation on exam, but very tender. Will call tomorrow w/ results.\par - Neoplasm related pain: continue lidocaine patch. Consult w/ Dr. Patrick on 10/07/20.\par - Tachycardia: HR 97, on Xarelto and metoprolol, may be lack of hydration? Sent to lab for CMP today, if dry, will bring in for hydration. Encouraged ~ 2L of fluid, not just H2O daily.\par - Continue Rad-onc f/u\par - C2 scheduled for 10/13/20. RTO w/ Dr. Zhu  2 weeks post cycle 2 on 10/27

## 2020-10-05 NOTE — PHYSICAL EXAM
[Normal] : affect appropriate [Ambulatory and capable of all self care but unable to carry out any work activities] : Status 2- Ambulatory and capable of all self care but unable to carry out any work activities. Up and about more than 50% of waking hours [de-identified] : Pleasant, interactive in NAD. [de-identified] : supple [de-identified] : CTA b/l [de-identified] : S1/S2 [de-identified] : negative pedal edema or calve tenderness [de-identified] : BS+, soft, nontender on palpation. [de-identified] : upper thorax tendernen palpation. Pain to Left lateral ribcage.

## 2020-10-07 ENCOUNTER — APPOINTMENT (OUTPATIENT)
Dept: HEMATOLOGY ONCOLOGY | Facility: CLINIC | Age: 80
End: 2020-10-07
Payer: MEDICARE

## 2020-10-07 VITALS
HEIGHT: 70 IN | BODY MASS INDEX: 27.4 KG/M2 | HEART RATE: 103 BPM | WEIGHT: 191.38 LBS | SYSTOLIC BLOOD PRESSURE: 138 MMHG | OXYGEN SATURATION: 96 % | DIASTOLIC BLOOD PRESSURE: 75 MMHG

## 2020-10-07 DIAGNOSIS — E87.5 HYPERKALEMIA: ICD-10-CM

## 2020-10-07 LAB
ALBUMIN SERPL ELPH-MCNC: 4.5 G/DL
ALP BLD-CCNC: 164 U/L
ALT SERPL-CCNC: 18 U/L
ANION GAP SERPL CALC-SCNC: 14 MMOL/L
AST SERPL-CCNC: 15 U/L
BILIRUB SERPL-MCNC: 0.5 MG/DL
BUN SERPL-MCNC: 22 MG/DL
CALCIUM SERPL-MCNC: 9 MG/DL
CHLORIDE SERPL-SCNC: 104 MMOL/L
CO2 SERPL-SCNC: 23 MMOL/L
CREAT SERPL-MCNC: 1.54 MG/DL
GLUCOSE SERPL-MCNC: 119 MG/DL
POTASSIUM SERPL-SCNC: 5.6 MMOL/L
PROT SERPL-MCNC: 7.1 G/DL
SODIUM SERPL-SCNC: 141 MMOL/L

## 2020-10-07 PROCEDURE — 99214 OFFICE O/P EST MOD 30 MIN: CPT

## 2020-10-08 NOTE — HISTORY OF PRESENT ILLNESS
[de-identified] : Chart/Case reviewed in detail.81 y/o w/m w h/o metastatic lung cancerr w prominent bone mets, here for eval of pain.He currently is taking tramadol w/o much effect, and uses a lidocaine patch.He has chronic kidney issues rel to prior RCC Sx so his toleranc eof meds, including gabapentin, is limited.His pain is predom. in l rib region corresponding to known imaging lesion of l 8/9th rib.Has alreasdy received RT recently, w completion of it to l 8th rib per .Meds reviewed in detail..Note that pt took marijuana from his son, but took 4x dose and wound up in hospital. ( pain was improved, however)

## 2020-10-08 NOTE — PHYSICAL EXAM
[Ambulatory and capable of all self care but unable to carry out any work activities] : Status 2- Ambulatory and capable of all self care but unable to carry out any work activities. Up and about more than 50% of waking hours [Normal] : affect appropriate [de-identified] : pain rad to l CW upon a/p palp of sternum.

## 2020-10-08 NOTE — ASSESSMENT
[FreeTextEntry1] : Malignant bone pain in setting of met lung Ca ( SCC ).Pt intol to marijuana but may respond to PEA which was explained to pt, prob equivalent..Can take /use up to 3 lidocaine patches across CW for 24 h startight, which he will try as well.Tylenol limit of 9469-2416 mg.Not to use NSAIDS.RTC 1 mos.

## 2020-10-12 RX ORDER — SODIUM POLYSTYRENE SULFONATE 15 G/60ML
15 SUSPENSION ORAL; RECTAL
Qty: 400 | Refills: 0 | Status: ACTIVE | COMMUNITY
Start: 2020-10-07 | End: 1900-01-01

## 2020-10-13 ENCOUNTER — LABORATORY RESULT (OUTPATIENT)
Age: 80
End: 2020-10-13

## 2020-10-13 ENCOUNTER — RESULT REVIEW (OUTPATIENT)
Age: 80
End: 2020-10-13

## 2020-10-13 ENCOUNTER — APPOINTMENT (OUTPATIENT)
Age: 80
End: 2020-10-13

## 2020-10-13 LAB
BASOPHILS # BLD AUTO: 0.1 K/UL — SIGNIFICANT CHANGE UP (ref 0–0.2)
BASOPHILS NFR BLD AUTO: 1.4 % — SIGNIFICANT CHANGE UP (ref 0–2)
BUN SERPL-MCNC: 25 MG/DL — HIGH (ref 7–23)
CA-I BLDA-SCNC: 1.2 MMOL/L — SIGNIFICANT CHANGE UP (ref 1.12–1.3)
CHLORIDE SERPL-SCNC: 102 MMOL/L — SIGNIFICANT CHANGE UP (ref 96–108)
CO2 SERPL-SCNC: 26 MMOL/L — SIGNIFICANT CHANGE UP (ref 22–31)
CREAT SERPL-MCNC: 1.6 MG/DL — HIGH (ref 0.5–1.3)
EOSINOPHIL # BLD AUTO: 0.1 K/UL — SIGNIFICANT CHANGE UP (ref 0–0.5)
EOSINOPHIL NFR BLD AUTO: 1.9 % — SIGNIFICANT CHANGE UP (ref 0–6)
GLUCOSE SERPL-MCNC: 99 MG/DL — SIGNIFICANT CHANGE UP (ref 70–99)
HCT VFR BLD CALC: 42.5 % — SIGNIFICANT CHANGE UP (ref 39–50)
HGB BLD-MCNC: 13.8 G/DL — SIGNIFICANT CHANGE UP (ref 13–17)
LYMPHOCYTES # BLD AUTO: 0.6 K/UL — LOW (ref 1–3.3)
LYMPHOCYTES # BLD AUTO: 8.2 % — LOW (ref 13–44)
MCHC RBC-ENTMCNC: 29.6 PG — SIGNIFICANT CHANGE UP (ref 27–34)
MCHC RBC-ENTMCNC: 32.5 G/DL — SIGNIFICANT CHANGE UP (ref 32–36)
MCV RBC AUTO: 91.2 FL — SIGNIFICANT CHANGE UP (ref 80–100)
MONOCYTES # BLD AUTO: 0.8 K/UL — SIGNIFICANT CHANGE UP (ref 0–0.9)
MONOCYTES NFR BLD AUTO: 10.5 % — SIGNIFICANT CHANGE UP (ref 2–14)
NEUTROPHILS # BLD AUTO: 6 K/UL — SIGNIFICANT CHANGE UP (ref 1.8–7.4)
NEUTROPHILS NFR BLD AUTO: 78 % — HIGH (ref 43–77)
PLATELET # BLD AUTO: 220 K/UL — SIGNIFICANT CHANGE UP (ref 150–400)
POTASSIUM SERPL-MCNC: 5.2 MMOL/L — SIGNIFICANT CHANGE UP (ref 3.5–5.3)
POTASSIUM SERPL-SCNC: 5.2 MMOL/L — SIGNIFICANT CHANGE UP (ref 3.5–5.3)
RBC # BLD: 4.66 M/UL — SIGNIFICANT CHANGE UP (ref 4.2–5.8)
RBC # FLD: 14 % — SIGNIFICANT CHANGE UP (ref 10.3–14.5)
SODIUM SERPL-SCNC: 139 MMOL/L — SIGNIFICANT CHANGE UP (ref 135–145)
WBC # BLD: 7.7 K/UL — SIGNIFICANT CHANGE UP (ref 3.8–10.5)
WBC # FLD AUTO: 7.7 K/UL — SIGNIFICANT CHANGE UP (ref 3.8–10.5)

## 2020-10-14 ENCOUNTER — APPOINTMENT (OUTPATIENT)
Dept: RADIATION ONCOLOGY | Facility: CLINIC | Age: 80
End: 2020-10-14

## 2020-10-22 ENCOUNTER — OUTPATIENT (OUTPATIENT)
Dept: OUTPATIENT SERVICES | Facility: HOSPITAL | Age: 80
LOS: 1 days | Discharge: HOME | End: 2020-10-22

## 2020-10-22 DIAGNOSIS — Z90.2 ACQUIRED ABSENCE OF LUNG [PART OF]: Chronic | ICD-10-CM

## 2020-10-22 DIAGNOSIS — C34.90 MALIGNANT NEOPLASM OF UNSPECIFIED PART OF UNSPECIFIED BRONCHUS OR LUNG: ICD-10-CM

## 2020-10-22 DIAGNOSIS — Z98.890 OTHER SPECIFIED POSTPROCEDURAL STATES: Chronic | ICD-10-CM

## 2020-10-22 DIAGNOSIS — Z92.3 PERSONAL HISTORY OF IRRADIATION: Chronic | ICD-10-CM

## 2020-10-27 ENCOUNTER — APPOINTMENT (OUTPATIENT)
Dept: HEMATOLOGY ONCOLOGY | Facility: CLINIC | Age: 80
End: 2020-10-27
Payer: MEDICARE

## 2020-10-27 ENCOUNTER — RESULT REVIEW (OUTPATIENT)
Age: 80
End: 2020-10-27

## 2020-10-27 VITALS
SYSTOLIC BLOOD PRESSURE: 154 MMHG | HEART RATE: 98 BPM | DIASTOLIC BLOOD PRESSURE: 94 MMHG | BODY MASS INDEX: 36.32 KG/M2 | WEIGHT: 192.38 LBS | OXYGEN SATURATION: 96 % | HEIGHT: 61 IN

## 2020-10-27 LAB
BASOPHILS # BLD AUTO: 0.1 K/UL — SIGNIFICANT CHANGE UP (ref 0–0.2)
BASOPHILS NFR BLD AUTO: 1.3 % — SIGNIFICANT CHANGE UP (ref 0–2)
EOSINOPHIL # BLD AUTO: 0.1 K/UL — SIGNIFICANT CHANGE UP (ref 0–0.5)
EOSINOPHIL NFR BLD AUTO: 0.7 % — SIGNIFICANT CHANGE UP (ref 0–6)
HCT VFR BLD CALC: 37.5 % — LOW (ref 39–50)
HGB BLD-MCNC: 12.2 G/DL — LOW (ref 13–17)
LYMPHOCYTES # BLD AUTO: 0.7 K/UL — LOW (ref 1–3.3)
LYMPHOCYTES # BLD AUTO: 8.2 % — LOW (ref 13–44)
MCHC RBC-ENTMCNC: 29.7 PG — SIGNIFICANT CHANGE UP (ref 27–34)
MCHC RBC-ENTMCNC: 32.4 G/DL — SIGNIFICANT CHANGE UP (ref 32–36)
MCV RBC AUTO: 91.8 FL — SIGNIFICANT CHANGE UP (ref 80–100)
MONOCYTES # BLD AUTO: 0.8 K/UL — SIGNIFICANT CHANGE UP (ref 0–0.9)
MONOCYTES NFR BLD AUTO: 8.7 % — SIGNIFICANT CHANGE UP (ref 2–14)
NEUTROPHILS # BLD AUTO: 7 K/UL — SIGNIFICANT CHANGE UP (ref 1.8–7.4)
NEUTROPHILS NFR BLD AUTO: 81.1 % — HIGH (ref 43–77)
PLATELET # BLD AUTO: 114 K/UL — LOW (ref 150–400)
RBC # BLD: 4.09 M/UL — LOW (ref 4.2–5.8)
RBC # FLD: 15 % — HIGH (ref 10.3–14.5)
WBC # BLD: 8.7 K/UL — SIGNIFICANT CHANGE UP (ref 3.8–10.5)
WBC # FLD AUTO: 8.7 K/UL — SIGNIFICANT CHANGE UP (ref 3.8–10.5)

## 2020-10-27 PROCEDURE — 99214 OFFICE O/P EST MOD 30 MIN: CPT

## 2020-10-27 RX ORDER — LIDOCAINE 5% 700 MG/1
5 PATCH TOPICAL
Qty: 60 | Refills: 0 | Status: ACTIVE | COMMUNITY
Start: 2020-09-15 | End: 1900-01-01

## 2020-10-27 NOTE — ASSESSMENT
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 No adjuvant chemotherapy as he was poor Cisplatin candidate due to partial solitary kidney. PDL1 0% () on original path.  PET CT 8/18/20 shows new FDG avid hepatic lesions suspicious for metastatic disease and destructive lytic lesion left eighth rib and additional lesions left ninth rib and T1 spinous process, suspicious for metastatic disease. Liver core bx 9/03/20 confirms metastatic squamous cell carcinoma. No evidence of recurrence in head & neck region. \par \par Treatment for stage IV lung cancer include Carbo AUC 5, Taxol 160 mg/m2, and pembrolizumab every 3 weeks x 4 cycles, followed by maintenance pembrolizumab if there is no POD. Xgeva every 6 weeks for bone metastasis. Completed palliative radiation to the left lateral ribcage and C7-T3 on 10/01/20.\par \par S/p 2 cycles of carbo/taxol/pembrolizumab.\par Tolerating chemotherapy well aside from fatigue. 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 Mild thrombocytopenia. \par \par \par Plan:\par -continue carbo/taxol/pembrolizumab x 2 more cycles and then restage\par -bone mets - continue Xgeva q 6 weeks\par -Neoplasm related pain - continue lidocaine patch to L chest wall\par -CIPN - grade 1 - monitor\par -Follow up in 3 weeks

## 2020-10-27 NOTE — PHYSICAL EXAM
[Ambulatory and capable of all self care but unable to carry out any work activities] : Status 2- Ambulatory and capable of all self care but unable to carry out any work activities. Up and about more than 50% of waking hours [Normal] : affect appropriate [de-identified] : Pleasant, interactive in NAD. [de-identified] : supple [de-identified] : CTA b/l [de-identified] : S1/S2 [de-identified] : no edema [de-identified] : soft,NT, ND

## 2020-10-27 NOTE — HISTORY OF PRESENT ILLNESS
[de-identified] : 80 year old gentleman diagnosed with synchronous H&N and lung cancer. He also has a history of RCC, s/p left total nephrectomy and s/p right partial nephrectomy for ?cyst. 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 Completed 6300 cGy radiation therapy to larynx on 1/2/2020. 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  No adjuvant therapy as he was not a candidate for Cisplatin due to solitary partial kidney, and no clear role for carboplatin based therapy in the adjuvant setting. \par \par \par On surveillance CT chest on 8/3/20 - IMPRESSION:\par Enlarging para-aortic lymph nodes suspicious for metastatic disease.\par Lytic destructive lesion within the right 8th rib (series 6 image 73) likely metastatic.\par 4 cm indistinct hypodense lesion within the right lobe of the liver, suspicious for metastatic disease. Further evaluation can be performed with PET-CT or MRI.\par \par He notes left sided chest wall pain since April 2020.\par No significant weight loss. NO SOB. NO headaches, or dizziness.  No changes in vision.\par  [de-identified] : Returns for follow up.\par Notes feeling fatigued all the time.\par Appetite is good.\par No nausea/vomiting.\par No SOB. No cough. \par He has alternating constipation / diarrhea. \par Notes tingling in his fingertips, \par Denies back pain.\par Continues to have left chest wall pain.\par He took PEA supplement for pain x 3 weeks but noted no difference. \par \par

## 2020-11-02 ENCOUNTER — RESULT REVIEW (OUTPATIENT)
Age: 80
End: 2020-11-02

## 2020-11-02 ENCOUNTER — APPOINTMENT (OUTPATIENT)
Age: 80
End: 2020-11-02

## 2020-11-02 ENCOUNTER — LABORATORY RESULT (OUTPATIENT)
Age: 80
End: 2020-11-02

## 2020-11-02 LAB
BASOPHILS # BLD AUTO: 0.1 K/UL — SIGNIFICANT CHANGE UP (ref 0–0.2)
BASOPHILS NFR BLD AUTO: 1.2 % — SIGNIFICANT CHANGE UP (ref 0–2)
EOSINOPHIL # BLD AUTO: 0.1 K/UL — SIGNIFICANT CHANGE UP (ref 0–0.5)
EOSINOPHIL NFR BLD AUTO: 1.3 % — SIGNIFICANT CHANGE UP (ref 0–6)
HCT VFR BLD CALC: 38.7 % — LOW (ref 39–50)
HGB BLD-MCNC: 12.6 G/DL — LOW (ref 13–17)
LYMPHOCYTES # BLD AUTO: 0.7 K/UL — LOW (ref 1–3.3)
LYMPHOCYTES # BLD AUTO: 10.3 % — LOW (ref 13–44)
MCHC RBC-ENTMCNC: 30.6 PG — SIGNIFICANT CHANGE UP (ref 27–34)
MCHC RBC-ENTMCNC: 32.6 G/DL — SIGNIFICANT CHANGE UP (ref 32–36)
MCV RBC AUTO: 93.9 FL — SIGNIFICANT CHANGE UP (ref 80–100)
MONOCYTES # BLD AUTO: 0.8 K/UL — SIGNIFICANT CHANGE UP (ref 0–0.9)
MONOCYTES NFR BLD AUTO: 11.7 % — SIGNIFICANT CHANGE UP (ref 2–14)
NEUTROPHILS # BLD AUTO: 5 K/UL — SIGNIFICANT CHANGE UP (ref 1.8–7.4)
NEUTROPHILS NFR BLD AUTO: 75.4 % — SIGNIFICANT CHANGE UP (ref 43–77)
PLATELET # BLD AUTO: 170 K/UL — SIGNIFICANT CHANGE UP (ref 150–400)
RBC # BLD: 4.12 M/UL — LOW (ref 4.2–5.8)
RBC # FLD: 16.3 % — HIGH (ref 10.3–14.5)
WBC # BLD: 6.7 K/UL — SIGNIFICANT CHANGE UP (ref 3.8–10.5)
WBC # FLD AUTO: 6.7 K/UL — SIGNIFICANT CHANGE UP (ref 3.8–10.5)

## 2020-11-04 ENCOUNTER — APPOINTMENT (OUTPATIENT)
Dept: HEMATOLOGY ONCOLOGY | Facility: CLINIC | Age: 80
End: 2020-11-04
Payer: MEDICARE

## 2020-11-04 VITALS
BODY MASS INDEX: 36.63 KG/M2 | OXYGEN SATURATION: 95 % | TEMPERATURE: 97.3 F | SYSTOLIC BLOOD PRESSURE: 125 MMHG | DIASTOLIC BLOOD PRESSURE: 78 MMHG | WEIGHT: 194 LBS | HEART RATE: 101 BPM | HEIGHT: 61 IN

## 2020-11-04 DIAGNOSIS — Z51.89 ENCOUNTER FOR OTHER SPECIFIED AFTERCARE: ICD-10-CM

## 2020-11-04 DIAGNOSIS — Z51.5 ENCOUNTER FOR PALLIATIVE CARE: ICD-10-CM

## 2020-11-04 DIAGNOSIS — R11.2 NAUSEA WITH VOMITING, UNSPECIFIED: ICD-10-CM

## 2020-11-04 DIAGNOSIS — Z51.11 ENCOUNTER FOR ANTINEOPLASTIC CHEMOTHERAPY: ICD-10-CM

## 2020-11-04 PROCEDURE — 99213 OFFICE O/P EST LOW 20 MIN: CPT

## 2020-11-05 ENCOUNTER — APPOINTMENT (OUTPATIENT)
Dept: RADIATION ONCOLOGY | Facility: CLINIC | Age: 80
End: 2020-11-05
Payer: MEDICARE

## 2020-11-05 PROCEDURE — 99024 POSTOP FOLLOW-UP VISIT: CPT

## 2020-11-07 PROBLEM — Z51.5 PALLIATIVE CARE BY SPECIALIST: Status: ACTIVE | Noted: 2020-10-08

## 2020-11-07 NOTE — ASSESSMENT
[FreeTextEntry1] : Malignant bone pain in setting of met lung Ca ( SCC ).Pain well controlled post RT.To f/u w primary team.

## 2020-11-07 NOTE — PHYSICAL EXAM
[Ambulatory and capable of all self care but unable to carry out any work activities] : Status 2- Ambulatory and capable of all self care but unable to carry out any work activities. Up and about more than 50% of waking hours [Normal] : affect appropriate [de-identified] : pain rad to l CW upon a/p palp of sternum.

## 2020-11-07 NOTE — HISTORY OF PRESENT ILLNESS
[de-identified] : Chart/Case reviewed in detail.81 y/o w/m w h/o metastatic lung cancerr w prominent bone mets, here for eval of pain.He currently is taking tramadol w/o much effect, and uses a lidocaine patch.He has chronic kidney issues rel to prior RCC Sx so his toleranc eof meds, including gabapentin, is limited.His pain is predom. in l rib region corresponding to known imaging lesion of l 8/9th rib.Has alreasdy received RT recently, w completion of it to l 8th rib per .Meds reviewed in detail..Note that pt took marijuana from his son, but took 4x dose and wound up in hospital. ( pain was improved, however) [de-identified] : Feeling much better." I just came in to say hello".Pt informed of my senior care.Pain well controlled.

## 2020-11-08 NOTE — REASON FOR VISIT
[Post-Treatment Evaluation] : post-treatment evaluation for [Bone Metastasis] : bone metastasis [Head and Neck Cancer] : head and neck cancer [Lung Cancer] : lung cancer [Other: _____] : [unfilled]

## 2020-11-08 NOTE — HISTORY OF PRESENT ILLNESS
[FreeTextEntry1] : This 79 y/o gentleman is being seen for radiation medicine consultation.  He has a history of multiple cancers.  He completed RT for stage I carcinoma of the glottic larynx, most recently with bone metastases from NSCLC (poorly differentiated squamous cell carcinoma SHELLI, s/p left upper lobectomy). Patient completed palliative radiation to the left lateral ribcage and C7-T3 (2000 cGy) on 10/1/2020. He notes complete relief of upper spine pain, but not yet complete relied from rib pain, but better.\par \par He follows with Dr. Zhu.\par

## 2020-11-08 NOTE — LETTER GREETING
[Dear Doctor] : Dear Doctor, [Follow-Up] : Your patient, [unfilled] was seen in my office today for follow-up [Please see my note below.] : Please see my note below. [FreeTextEntry2] : Isak Zhu MD

## 2020-11-08 NOTE — DISEASE MANAGEMENT
[Clinical] : TNM Stage: c [IV] : IV [FreeTextEntry4] : metastatic  [TTNM] : 1 [NTNM] : x [MTNM] : 1 [de-identified] : 2000 cGy [de-identified] : left 8th rib and C7 - T3

## 2020-11-08 NOTE — REVIEW OF SYSTEMS
[Negative] : Allergic/Immunologic [FreeTextEntry9] : still some persistent pain in area of treated left posterolateral ribs.

## 2020-11-08 NOTE — ASSESSMENT
[Metastatic disease without local control] : Metastatic disease without local control [FreeTextEntry1] : No appreciable sequelae of RT , but incomplete relief of rib pain thus far.

## 2020-11-10 ENCOUNTER — NON-APPOINTMENT (OUTPATIENT)
Age: 80
End: 2020-11-10

## 2020-11-13 ENCOUNTER — APPOINTMENT (OUTPATIENT)
Dept: HEMATOLOGY ONCOLOGY | Facility: CLINIC | Age: 80
End: 2020-11-13
Payer: MEDICARE

## 2020-11-13 ENCOUNTER — RESULT REVIEW (OUTPATIENT)
Age: 80
End: 2020-11-13

## 2020-11-13 VITALS
TEMPERATURE: 97.2 F | DIASTOLIC BLOOD PRESSURE: 91 MMHG | SYSTOLIC BLOOD PRESSURE: 151 MMHG | HEIGHT: 61 IN | WEIGHT: 193.04 LBS | OXYGEN SATURATION: 98 % | BODY MASS INDEX: 36.45 KG/M2 | HEART RATE: 90 BPM

## 2020-11-13 DIAGNOSIS — G89.3 NEOPLASM RELATED PAIN (ACUTE) (CHRONIC): ICD-10-CM

## 2020-11-13 LAB
BASOPHILS # BLD AUTO: 0.1 K/UL — SIGNIFICANT CHANGE UP (ref 0–0.2)
BASOPHILS NFR BLD AUTO: 0.9 % — SIGNIFICANT CHANGE UP (ref 0–2)
EOSINOPHIL # BLD AUTO: 0.1 K/UL — SIGNIFICANT CHANGE UP (ref 0–0.5)
EOSINOPHIL NFR BLD AUTO: 0.5 % — SIGNIFICANT CHANGE UP (ref 0–6)
HCT VFR BLD CALC: 36.1 % — LOW (ref 39–50)
HGB BLD-MCNC: 11.6 G/DL — LOW (ref 13–17)
LYMPHOCYTES # BLD AUTO: 0.6 K/UL — LOW (ref 1–3.3)
LYMPHOCYTES # BLD AUTO: 5.7 % — LOW (ref 13–44)
MCHC RBC-ENTMCNC: 30.8 PG — SIGNIFICANT CHANGE UP (ref 27–34)
MCHC RBC-ENTMCNC: 32.2 G/DL — SIGNIFICANT CHANGE UP (ref 32–36)
MCV RBC AUTO: 95.6 FL — SIGNIFICANT CHANGE UP (ref 80–100)
MONOCYTES # BLD AUTO: 0.8 K/UL — SIGNIFICANT CHANGE UP (ref 0–0.9)
MONOCYTES NFR BLD AUTO: 6.9 % — SIGNIFICANT CHANGE UP (ref 2–14)
NEUTROPHILS # BLD AUTO: 9.7 K/UL — HIGH (ref 1.8–7.4)
NEUTROPHILS NFR BLD AUTO: 86.1 % — HIGH (ref 43–77)
PLATELET # BLD AUTO: 108 K/UL — LOW (ref 150–400)
RBC # BLD: 3.78 M/UL — LOW (ref 4.2–5.8)
RBC # FLD: 17.4 % — HIGH (ref 10.3–14.5)
WBC # BLD: 11.3 K/UL — HIGH (ref 3.8–10.5)
WBC # FLD AUTO: 11.3 K/UL — HIGH (ref 3.8–10.5)

## 2020-11-13 PROCEDURE — 99214 OFFICE O/P EST MOD 30 MIN: CPT

## 2020-11-13 NOTE — PHYSICAL EXAM
[Ambulatory and capable of all self care but unable to carry out any work activities] : Status 2- Ambulatory and capable of all self care but unable to carry out any work activities. Up and about more than 50% of waking hours [Normal] : affect appropriate [de-identified] : Pleasant, interactive in NAD. [de-identified] : supple [de-identified] : CTA b/l [de-identified] : S1/S2 [de-identified] : no edema [de-identified] : soft,NT, ND

## 2020-11-13 NOTE — HISTORY OF PRESENT ILLNESS
[de-identified] : 80 year old gentleman diagnosed with synchronous H&N and lung cancer. He also has a history of RCC, s/p left total nephrectomy and s/p right partial nephrectomy for ?cyst. 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 Completed 6300 cGy radiation therapy to larynx on 1/2/2020. 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  No adjuvant therapy as he was not a candidate for Cisplatin due to solitary partial kidney, and no clear role for carboplatin based therapy in the adjuvant setting. \par \par \par On surveillance CT chest on 8/3/20 - IMPRESSION:\par Enlarging para-aortic lymph nodes suspicious for metastatic disease.\par Lytic destructive lesion within the right 8th rib (series 6 image 73) likely metastatic.\par 4 cm indistinct hypodense lesion within the right lobe of the liver, suspicious for metastatic disease. Further evaluation can be performed with PET-CT or MRI.\par \par He notes left sided chest wall pain since April 2020.\par No significant weight loss. NO SOB. NO headaches, or dizziness.  No changes in vision.\par  [de-identified] : Returns for follow up.\par Notes severe constipation and took dulcolax / colace did not work, but mag citrate is what helped. \par No fevers.\par No loss of appetite.\par He feels SOB, about the same. \par He feels tired all the time. \par Left sided chest wall pain is much better, but still there if he doesn't do the lidocaine patch.\par

## 2020-11-13 NOTE — ASSESSMENT
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 No adjuvant chemotherapy as he was poor Cisplatin candidate due to partial solitary kidney. PDL1 0% () on original path.  PET CT 8/18/20 shows new FDG avid hepatic lesions suspicious for metastatic disease and destructive lytic lesion left eighth rib and additional lesions left ninth rib and T1 spinous process, suspicious for metastatic disease. Liver core bx 9/03/20 confirms metastatic squamous cell carcinoma. No evidence of recurrence in head & neck region. \par \par Treatment for stage IV lung cancer include Carbo AUC 5, Taxol 160 mg/m2, and pembrolizumab every 3 weeks x 4 cycles, followed by maintenance pembrolizumab if there is no POD. Xgeva every 6 weeks for bone metastasis. 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 \par Labs reviewed. Mild thrombocytopenia. 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

## 2020-11-19 ENCOUNTER — OUTPATIENT (OUTPATIENT)
Dept: OUTPATIENT SERVICES | Facility: HOSPITAL | Age: 80
LOS: 1 days | Discharge: HOME | End: 2020-11-19

## 2020-11-19 DIAGNOSIS — Z98.890 OTHER SPECIFIED POSTPROCEDURAL STATES: Chronic | ICD-10-CM

## 2020-11-19 DIAGNOSIS — Z92.3 PERSONAL HISTORY OF IRRADIATION: Chronic | ICD-10-CM

## 2020-11-19 DIAGNOSIS — C34.90 MALIGNANT NEOPLASM OF UNSPECIFIED PART OF UNSPECIFIED BRONCHUS OR LUNG: ICD-10-CM

## 2020-11-19 DIAGNOSIS — Z90.2 ACQUIRED ABSENCE OF LUNG [PART OF]: Chronic | ICD-10-CM

## 2020-11-24 ENCOUNTER — LABORATORY RESULT (OUTPATIENT)
Age: 80
End: 2020-11-24

## 2020-11-24 ENCOUNTER — RESULT REVIEW (OUTPATIENT)
Age: 80
End: 2020-11-24

## 2020-11-24 ENCOUNTER — APPOINTMENT (OUTPATIENT)
Age: 80
End: 2020-11-24

## 2020-11-24 LAB
BASOPHILS # BLD AUTO: 0.1 K/UL — SIGNIFICANT CHANGE UP (ref 0–0.2)
BASOPHILS NFR BLD AUTO: 1 % — SIGNIFICANT CHANGE UP (ref 0–2)
EOSINOPHIL # BLD AUTO: 0.1 K/UL — SIGNIFICANT CHANGE UP (ref 0–0.5)
EOSINOPHIL NFR BLD AUTO: 1.4 % — SIGNIFICANT CHANGE UP (ref 0–6)
HCT VFR BLD CALC: 36.8 % — LOW (ref 39–50)
HGB BLD-MCNC: 11.8 G/DL — LOW (ref 13–17)
LYMPHOCYTES # BLD AUTO: 0.5 K/UL — LOW (ref 1–3.3)
LYMPHOCYTES # BLD AUTO: 7.4 % — LOW (ref 13–44)
MCHC RBC-ENTMCNC: 30.1 PG — SIGNIFICANT CHANGE UP (ref 27–34)
MCHC RBC-ENTMCNC: 32 G/DL — SIGNIFICANT CHANGE UP (ref 32–36)
MCV RBC AUTO: 94.1 FL — SIGNIFICANT CHANGE UP (ref 80–100)
MONOCYTES # BLD AUTO: 0.7 K/UL — SIGNIFICANT CHANGE UP (ref 0–0.9)
MONOCYTES NFR BLD AUTO: 10.8 % — SIGNIFICANT CHANGE UP (ref 2–14)
NEUTROPHILS # BLD AUTO: 5.3 K/UL — SIGNIFICANT CHANGE UP (ref 1.8–7.4)
NEUTROPHILS NFR BLD AUTO: 79.4 % — HIGH (ref 43–77)
PLATELET # BLD AUTO: 160 K/UL — SIGNIFICANT CHANGE UP (ref 150–400)
RBC # BLD: 3.91 M/UL — LOW (ref 4.2–5.8)
RBC # FLD: 16.9 % — HIGH (ref 10.3–14.5)
WBC # BLD: 6.7 K/UL — SIGNIFICANT CHANGE UP (ref 3.8–10.5)
WBC # FLD AUTO: 6.7 K/UL — SIGNIFICANT CHANGE UP (ref 3.8–10.5)

## 2020-11-25 DIAGNOSIS — R11.2 NAUSEA WITH VOMITING, UNSPECIFIED: ICD-10-CM

## 2020-11-25 DIAGNOSIS — Z51.11 ENCOUNTER FOR ANTINEOPLASTIC CHEMOTHERAPY: ICD-10-CM

## 2020-12-04 ENCOUNTER — APPOINTMENT (OUTPATIENT)
Dept: NUCLEAR MEDICINE | Facility: CLINIC | Age: 80
End: 2020-12-04
Payer: MEDICARE

## 2020-12-04 ENCOUNTER — OUTPATIENT (OUTPATIENT)
Dept: OUTPATIENT SERVICES | Facility: HOSPITAL | Age: 80
LOS: 1 days | End: 2020-12-04

## 2020-12-04 DIAGNOSIS — Z92.3 PERSONAL HISTORY OF IRRADIATION: Chronic | ICD-10-CM

## 2020-12-04 DIAGNOSIS — Z00.8 ENCOUNTER FOR OTHER GENERAL EXAMINATION: ICD-10-CM

## 2020-12-04 DIAGNOSIS — Z98.890 OTHER SPECIFIED POSTPROCEDURAL STATES: Chronic | ICD-10-CM

## 2020-12-04 DIAGNOSIS — Z90.2 ACQUIRED ABSENCE OF LUNG [PART OF]: Chronic | ICD-10-CM

## 2020-12-04 PROCEDURE — 78815 PET IMAGE W/CT SKULL-THIGH: CPT | Mod: 26,PS

## 2020-12-08 ENCOUNTER — APPOINTMENT (OUTPATIENT)
Dept: HEMATOLOGY ONCOLOGY | Facility: CLINIC | Age: 80
End: 2020-12-08
Payer: MEDICARE

## 2020-12-08 ENCOUNTER — RESULT REVIEW (OUTPATIENT)
Age: 80
End: 2020-12-08

## 2020-12-08 ENCOUNTER — LABORATORY RESULT (OUTPATIENT)
Age: 80
End: 2020-12-08

## 2020-12-08 VITALS
OXYGEN SATURATION: 97 % | HEIGHT: 69 IN | BODY MASS INDEX: 28.59 KG/M2 | DIASTOLIC BLOOD PRESSURE: 81 MMHG | HEART RATE: 97 BPM | WEIGHT: 193.01 LBS | SYSTOLIC BLOOD PRESSURE: 134 MMHG

## 2020-12-08 LAB
BASOPHILS # BLD AUTO: 0.1 K/UL — SIGNIFICANT CHANGE UP (ref 0–0.2)
BASOPHILS NFR BLD AUTO: 0.7 % — SIGNIFICANT CHANGE UP (ref 0–2)
EOSINOPHIL # BLD AUTO: 0 K/UL — SIGNIFICANT CHANGE UP (ref 0–0.5)
EOSINOPHIL NFR BLD AUTO: 0.4 % — SIGNIFICANT CHANGE UP (ref 0–6)
HCT VFR BLD CALC: 36.9 % — LOW (ref 39–50)
HGB BLD-MCNC: 11.8 G/DL — LOW (ref 13–17)
LYMPHOCYTES # BLD AUTO: 0.7 K/UL — LOW (ref 1–3.3)
LYMPHOCYTES # BLD AUTO: 8.4 % — LOW (ref 13–44)
MCHC RBC-ENTMCNC: 30.6 PG — SIGNIFICANT CHANGE UP (ref 27–34)
MCHC RBC-ENTMCNC: 31.9 G/DL — LOW (ref 32–36)
MCV RBC AUTO: 95.7 FL — SIGNIFICANT CHANGE UP (ref 80–100)
MONOCYTES # BLD AUTO: 0.7 K/UL — SIGNIFICANT CHANGE UP (ref 0–0.9)
MONOCYTES NFR BLD AUTO: 8.1 % — SIGNIFICANT CHANGE UP (ref 2–14)
NEUTROPHILS # BLD AUTO: 6.7 K/UL — SIGNIFICANT CHANGE UP (ref 1.8–7.4)
NEUTROPHILS NFR BLD AUTO: 82.4 % — HIGH (ref 43–77)
PLATELET # BLD AUTO: 99 K/UL — LOW (ref 150–400)
RBC # BLD: 3.86 M/UL — LOW (ref 4.2–5.8)
RBC # FLD: 16.2 % — HIGH (ref 10.3–14.5)
WBC # BLD: 8.2 K/UL — SIGNIFICANT CHANGE UP (ref 3.8–10.5)
WBC # FLD AUTO: 8.2 K/UL — SIGNIFICANT CHANGE UP (ref 3.8–10.5)

## 2020-12-08 PROCEDURE — 99214 OFFICE O/P EST MOD 30 MIN: CPT

## 2020-12-08 NOTE — ASSESSMENT
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 No adjuvant chemotherapy as he was poor Cisplatin candidate due to partial solitary kidney. PDL1 0% () on original path.  PET CT 8/18/20 shows new FDG avid hepatic lesions suspicious for metastatic disease and destructive lytic lesion left eighth rib and additional lesions left ninth rib and T1 spinous process, suspicious for metastatic disease. Liver core bx 9/03/20 confirms metastatic squamous cell carcinoma. No evidence of recurrence in head & neck region. \par Completed palliative radiation to the left lateral ribcage and C7-T3 on 10/01/20.\par He received Carbo AUC 5, Taxol 160 mg/m2, and pembrolizumab every 3 weeks x 4 cycles completed on 11/24/20. Xgeva every 6 weeks for bone metastasis.\par \par Discussed 12/4/20 PET CT which shows improvement in prevascular nodules, stable liver lesion, and improved bone mets. No new disease. \par \par \par Plan:\par -will begin maintenance keytruda every 3 weeks on 12/14/20. Side effects of immunotherapy again reviewed. 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

## 2020-12-08 NOTE — PHYSICAL EXAM
[Ambulatory and capable of all self care but unable to carry out any work activities] : Status 2- Ambulatory and capable of all self care but unable to carry out any work activities. Up and about more than 50% of waking hours [Normal] : affect appropriate [de-identified] : Pleasant, interactive in NAD. [de-identified] : supple [de-identified] : no edema

## 2020-12-08 NOTE — HISTORY OF PRESENT ILLNESS
[de-identified] : 80 year old gentleman diagnosed with synchronous H&N and lung cancer. He also has a history of RCC, s/p left total nephrectomy and s/p right partial nephrectomy for ?cyst. 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 Completed 6300 cGy radiation therapy to larynx on 1/2/2020. 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  No adjuvant therapy as he was not a candidate for Cisplatin due to solitary partial kidney, and no clear role for carboplatin based therapy in the adjuvant setting. \par \par \par On surveillance CT chest on 8/3/20 - IMPRESSION:\par Enlarging para-aortic lymph nodes suspicious for metastatic disease.\par Lytic destructive lesion within the right 8th rib (series 6 image 73) likely metastatic.\par 4 cm indistinct hypodense lesion within the right lobe of the liver, suspicious for metastatic disease. Further evaluation can be performed with PET-CT or MRI.\par \par He notes left sided chest wall pain since April 2020.\par No significant weight loss. NO SOB. NO headaches, or dizziness.  No changes in vision.\par  [de-identified] : Returns for follow up.\par Left sided chest wall pain is nearly resolved.\par No fever. \par \par 12/6/20 PET CT\par 1. FDG avid metastatic posterior right hepatic lobe lesion, not significantly changed. Additional FDG avid hepatic lesions, not definitely identified.\par 2. Decreased FDG avidity and size of prevascular lymph nodes.\par 3. New diffuse bone marrow hypermetabolism likely related to interval treatment. Previously noted FDG avidity in several bones, not identifiable. Decreased extraosseous soft tissue extension associated with lytic posterolateral left eighth rib lesion.\par 4. unchanged subcentimeter right upper lobe pulmonary nodule, too small to characterize. Postsurgical changes of left upper lobectomy, again noted.\par 5. No definite FDG avid recurrent disease in the head and neck, although limited evaluation due to patient motion.\par

## 2020-12-14 ENCOUNTER — APPOINTMENT (OUTPATIENT)
Dept: OTOLARYNGOLOGY | Facility: CLINIC | Age: 80
End: 2020-12-14
Payer: MEDICARE

## 2020-12-14 ENCOUNTER — LABORATORY RESULT (OUTPATIENT)
Age: 80
End: 2020-12-14

## 2020-12-14 ENCOUNTER — RESULT REVIEW (OUTPATIENT)
Age: 80
End: 2020-12-14

## 2020-12-14 ENCOUNTER — APPOINTMENT (OUTPATIENT)
Age: 80
End: 2020-12-14

## 2020-12-14 VITALS — SYSTOLIC BLOOD PRESSURE: 142 MMHG | HEART RATE: 125 BPM | DIASTOLIC BLOOD PRESSURE: 87 MMHG

## 2020-12-14 LAB
BASOPHILS # BLD AUTO: 0.1 K/UL — SIGNIFICANT CHANGE UP (ref 0–0.2)
BASOPHILS NFR BLD AUTO: 1.2 % — SIGNIFICANT CHANGE UP (ref 0–2)
EOSINOPHIL # BLD AUTO: 0.2 K/UL — SIGNIFICANT CHANGE UP (ref 0–0.5)
EOSINOPHIL NFR BLD AUTO: 2.8 % — SIGNIFICANT CHANGE UP (ref 0–6)
HCT VFR BLD CALC: 37.8 % — LOW (ref 39–50)
HGB BLD-MCNC: 12.4 G/DL — LOW (ref 13–17)
LYMPHOCYTES # BLD AUTO: 0.5 K/UL — LOW (ref 1–3.3)
LYMPHOCYTES # BLD AUTO: 8.4 % — LOW (ref 13–44)
MCHC RBC-ENTMCNC: 32.2 PG — SIGNIFICANT CHANGE UP (ref 27–34)
MCHC RBC-ENTMCNC: 32.8 G/DL — SIGNIFICANT CHANGE UP (ref 32–36)
MCV RBC AUTO: 98 FL — SIGNIFICANT CHANGE UP (ref 80–100)
MONOCYTES # BLD AUTO: 0.6 K/UL — SIGNIFICANT CHANGE UP (ref 0–0.9)
MONOCYTES NFR BLD AUTO: 9.9 % — SIGNIFICANT CHANGE UP (ref 2–14)
NEUTROPHILS # BLD AUTO: 4.4 K/UL — SIGNIFICANT CHANGE UP (ref 1.8–7.4)
NEUTROPHILS NFR BLD AUTO: 77.6 % — HIGH (ref 43–77)
PLATELET # BLD AUTO: 99 K/UL — LOW (ref 150–400)
RBC # BLD: 3.85 M/UL — LOW (ref 4.2–5.8)
RBC # FLD: 17.1 % — HIGH (ref 10.3–14.5)
WBC # BLD: 5.6 K/UL — SIGNIFICANT CHANGE UP (ref 3.8–10.5)
WBC # FLD AUTO: 5.6 K/UL — SIGNIFICANT CHANGE UP (ref 3.8–10.5)

## 2020-12-14 PROCEDURE — 31575 DIAGNOSTIC LARYNGOSCOPY: CPT

## 2020-12-14 PROCEDURE — 99214 OFFICE O/P EST MOD 30 MIN: CPT | Mod: 25

## 2020-12-14 NOTE — HISTORY OF PRESENT ILLNESS
[de-identified] : 79 y/o male who completed radiation tx for VC SCCa with Dr. Loaiza on 1/2/2020, last PET ct 12/4/2020,  But the lung cancer mets to the liver and bone and completed Radiation and chemo and now on immunotherapy 12/14/2020. pt is here for f/up, pt has no c.o in the throat area, + fatigue.  Pt denies of  sob, dysphonia, dysphagia, pain, otalgia.  His weight is stable.  He denies any hemoptysis.  He reports pain along the left surgical site in the chest and along his back has resolved.\par \par

## 2020-12-15 ENCOUNTER — NON-APPOINTMENT (OUTPATIENT)
Age: 80
End: 2020-12-15

## 2020-12-16 RX ORDER — HYDROXYZINE HYDROCHLORIDE 50 MG/1
50 TABLET ORAL
Qty: 30 | Refills: 0 | Status: ACTIVE | COMMUNITY
Start: 2020-12-16 | End: 1900-01-01

## 2020-12-23 ENCOUNTER — APPOINTMENT (OUTPATIENT)
Dept: DERMATOLOGY | Facility: CLINIC | Age: 80
End: 2020-12-23
Payer: MEDICARE

## 2020-12-23 PROCEDURE — 99203 OFFICE O/P NEW LOW 30 MIN: CPT

## 2020-12-24 ENCOUNTER — OUTPATIENT (OUTPATIENT)
Dept: OUTPATIENT SERVICES | Facility: HOSPITAL | Age: 80
LOS: 1 days | Discharge: HOME | End: 2020-12-24

## 2020-12-24 DIAGNOSIS — Z98.890 OTHER SPECIFIED POSTPROCEDURAL STATES: Chronic | ICD-10-CM

## 2020-12-24 DIAGNOSIS — C34.90 MALIGNANT NEOPLASM OF UNSPECIFIED PART OF UNSPECIFIED BRONCHUS OR LUNG: ICD-10-CM

## 2020-12-24 DIAGNOSIS — Z92.3 PERSONAL HISTORY OF IRRADIATION: Chronic | ICD-10-CM

## 2020-12-24 DIAGNOSIS — Z90.2 ACQUIRED ABSENCE OF LUNG [PART OF]: Chronic | ICD-10-CM

## 2020-12-28 ENCOUNTER — NON-APPOINTMENT (OUTPATIENT)
Age: 80
End: 2020-12-28

## 2020-12-28 DIAGNOSIS — R42 DIZZINESS AND GIDDINESS: ICD-10-CM

## 2020-12-29 ENCOUNTER — NON-APPOINTMENT (OUTPATIENT)
Age: 80
End: 2020-12-29

## 2020-12-29 ENCOUNTER — OUTPATIENT (OUTPATIENT)
Dept: OUTPATIENT SERVICES | Facility: HOSPITAL | Age: 80
LOS: 1 days | End: 2020-12-29

## 2020-12-29 ENCOUNTER — APPOINTMENT (OUTPATIENT)
Dept: MRI IMAGING | Facility: CLINIC | Age: 80
End: 2020-12-29
Payer: MEDICARE

## 2020-12-29 ENCOUNTER — RESULT REVIEW (OUTPATIENT)
Age: 80
End: 2020-12-29

## 2020-12-29 DIAGNOSIS — Z92.3 PERSONAL HISTORY OF IRRADIATION: Chronic | ICD-10-CM

## 2020-12-29 DIAGNOSIS — Z90.2 ACQUIRED ABSENCE OF LUNG [PART OF]: Chronic | ICD-10-CM

## 2020-12-29 DIAGNOSIS — Z98.890 OTHER SPECIFIED POSTPROCEDURAL STATES: Chronic | ICD-10-CM

## 2020-12-29 DIAGNOSIS — C34.90 MALIGNANT NEOPLASM OF UNSPECIFIED PART OF UNSPECIFIED BRONCHUS OR LUNG: ICD-10-CM

## 2020-12-29 DIAGNOSIS — R42 DIZZINESS AND GIDDINESS: ICD-10-CM

## 2020-12-29 PROCEDURE — 70553 MRI BRAIN STEM W/O & W/DYE: CPT | Mod: 26

## 2020-12-31 ENCOUNTER — APPOINTMENT (OUTPATIENT)
Dept: RADIATION ONCOLOGY | Facility: CLINIC | Age: 80
End: 2020-12-31
Payer: MEDICARE

## 2020-12-31 DIAGNOSIS — J38.3 OTHER DISEASES OF VOCAL CORDS: ICD-10-CM

## 2020-12-31 DIAGNOSIS — Z92.3 PERSONAL HISTORY OF IRRADIATION: ICD-10-CM

## 2020-12-31 DIAGNOSIS — Z85.528 PERSONAL HISTORY OF OTHER MALIGNANT NEOPLASM OF KIDNEY: ICD-10-CM

## 2020-12-31 PROCEDURE — 99442: CPT | Mod: 95

## 2020-12-31 NOTE — REASON FOR VISIT
[Routine Follow-Up] : routine follow-up visit for [Bone Metastasis] : bone metastasis [Head and Neck Cancer] : head and neck cancer [Lung Cancer] : lung cancer [Other: _____] : [unfilled]

## 2021-01-02 PROBLEM — Z85.528 PERSONAL HISTORY OF KIDNEY CANCER: Status: ACTIVE | Noted: 2020-07-22

## 2021-01-02 PROBLEM — J38.3 LESION OF TRUE VOCAL CORD: Status: ACTIVE | Noted: 2019-07-22

## 2021-01-02 PROBLEM — Z92.3 HISTORY OF RADIATION TO HEAD AND NECK REGION: Status: ACTIVE | Noted: 2020-02-14

## 2021-01-02 NOTE — DISEASE MANAGEMENT
[Clinical] : TNM Stage: c [IV] : IV [FreeTextEntry4] : metastatic NSCLC  [TTNM] : 1 [NTNM] : x [MTNM] : 1 [de-identified] : 2000 cGy [de-identified] : left 8th rib and C7 - T3 8

## 2021-01-02 NOTE — LETTER CLOSING
[Sincerely yours,] : Sincerely yours, [FreeTextEntry3] : Dominick Loaiza MD\par Physician in Chief\par Department of Radiation Medicine\par [facility] [place]\par [facility] [place]\par \par  of Radiation Medicine\par [facility]\par at  [facility]\par \par Radiation \par [facility]/\par [facility] [place]\par [address]\par [place], [place] [address]\par \par Tel: [phone]\par Fax: [phone]\par

## 2021-01-02 NOTE — ASSESSMENT
[Metastatic disease without local control] : Metastatic disease without local control [FreeTextEntry1] : No appreciable sequelae of RT

## 2021-01-02 NOTE — HISTORY OF PRESENT ILLNESS
[FreeTextEntry1] : This 81 y/o gentleman is being seen for follow-up.  He has a history of multiple cancers.  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 Patient completed palliative radiation to the left lateral ribcage and C7 - T3 (2000 cGy each site) on 10/1/2020. \par \par At last visit, he noted complete relief of upper spine pain, but not yet complete relief from rib pain. If uncontrolled rib pain continues, will consider re-irradiation to this site.\par \par Continues with systemic therapy per Dr. Zhu.  Mr. Zarate recently presented to Dr. Zhu's office with dizziness X2 days. He was sent for stat brain MRI on 12/29/2020.  Impression -- stable exam.  No mass effect, acute cerebral ischemia or evidence of metastatic disease.\par \par Today he denies any pain and is tolerating his systemic treatment well.\par

## 2021-01-05 ENCOUNTER — RESULT REVIEW (OUTPATIENT)
Age: 81
End: 2021-01-05

## 2021-01-05 ENCOUNTER — LABORATORY RESULT (OUTPATIENT)
Age: 81
End: 2021-01-05

## 2021-01-05 ENCOUNTER — APPOINTMENT (OUTPATIENT)
Age: 81
End: 2021-01-05

## 2021-01-05 ENCOUNTER — APPOINTMENT (OUTPATIENT)
Dept: HEMATOLOGY ONCOLOGY | Facility: CLINIC | Age: 81
End: 2021-01-05
Payer: MEDICARE

## 2021-01-05 VITALS
OXYGEN SATURATION: 93 % | WEIGHT: 190.04 LBS | BODY MASS INDEX: 28.15 KG/M2 | DIASTOLIC BLOOD PRESSURE: 68 MMHG | SYSTOLIC BLOOD PRESSURE: 116 MMHG | HEART RATE: 120 BPM | HEIGHT: 69 IN

## 2021-01-05 DIAGNOSIS — L29.9 PRURITUS, UNSPECIFIED: ICD-10-CM

## 2021-01-05 DIAGNOSIS — T45.1X5A DRUG-INDUCED POLYNEUROPATHY: ICD-10-CM

## 2021-01-05 DIAGNOSIS — G62.0 DRUG-INDUCED POLYNEUROPATHY: ICD-10-CM

## 2021-01-05 LAB
BASOPHILS # BLD AUTO: 0.1 K/UL — SIGNIFICANT CHANGE UP (ref 0–0.2)
BASOPHILS NFR BLD AUTO: 1.6 % — SIGNIFICANT CHANGE UP (ref 0–2)
EOSINOPHIL # BLD AUTO: 0.3 K/UL — SIGNIFICANT CHANGE UP (ref 0–0.5)
EOSINOPHIL NFR BLD AUTO: 4.8 % — SIGNIFICANT CHANGE UP (ref 0–6)
HCT VFR BLD CALC: 40.8 % — SIGNIFICANT CHANGE UP (ref 39–50)
HGB BLD-MCNC: 13.6 G/DL — SIGNIFICANT CHANGE UP (ref 13–17)
LYMPHOCYTES # BLD AUTO: 0.5 K/UL — LOW (ref 1–3.3)
LYMPHOCYTES # BLD AUTO: 9.5 % — LOW (ref 13–44)
MCHC RBC-ENTMCNC: 32.5 PG — SIGNIFICANT CHANGE UP (ref 27–34)
MCHC RBC-ENTMCNC: 33.2 G/DL — SIGNIFICANT CHANGE UP (ref 32–36)
MCV RBC AUTO: 98 FL — SIGNIFICANT CHANGE UP (ref 80–100)
MONOCYTES # BLD AUTO: 0.6 K/UL — SIGNIFICANT CHANGE UP (ref 0–0.9)
MONOCYTES NFR BLD AUTO: 10.1 % — SIGNIFICANT CHANGE UP (ref 2–14)
NEUTROPHILS # BLD AUTO: 4.1 K/UL — SIGNIFICANT CHANGE UP (ref 1.8–7.4)
NEUTROPHILS NFR BLD AUTO: 74.1 % — SIGNIFICANT CHANGE UP (ref 43–77)
PLATELET # BLD AUTO: 191 K/UL — SIGNIFICANT CHANGE UP (ref 150–400)
RBC # BLD: 4.17 M/UL — LOW (ref 4.2–5.8)
RBC # FLD: 14 % — SIGNIFICANT CHANGE UP (ref 10.3–14.5)
WBC # BLD: 5.5 K/UL — SIGNIFICANT CHANGE UP (ref 3.8–10.5)
WBC # FLD AUTO: 5.5 K/UL — SIGNIFICANT CHANGE UP (ref 3.8–10.5)

## 2021-01-05 PROCEDURE — 99072 ADDL SUPL MATRL&STAF TM PHE: CPT

## 2021-01-05 PROCEDURE — 99213 OFFICE O/P EST LOW 20 MIN: CPT

## 2021-01-05 RX ORDER — TRAMADOL HYDROCHLORIDE 50 MG/1
50 TABLET, COATED ORAL
Qty: 60 | Refills: 0 | Status: DISCONTINUED | COMMUNITY
Start: 2020-08-31 | End: 2021-01-05

## 2021-01-06 DIAGNOSIS — Z51.11 ENCOUNTER FOR ANTINEOPLASTIC CHEMOTHERAPY: ICD-10-CM

## 2021-01-06 PROBLEM — L29.9 PRURITUS OF SKIN: Status: ACTIVE | Noted: 2020-12-16

## 2021-01-06 PROBLEM — G62.0 CHEMOTHERAPY-INDUCED PERIPHERAL NEUROPATHY: Status: ACTIVE | Noted: 2020-10-27

## 2021-01-06 NOTE — ASSESSMENT
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 No adjuvant chemotherapy as he was poor Cisplatin candidate due to partial solitary kidney. PDL1 0% () on original path.  PET CT 8/18/20 shows new FDG avid hepatic lesions suspicious for metastatic disease and destructive lytic lesion left eighth rib and additional lesions left ninth rib and T1 spinous process, suspicious for metastatic disease. Liver core bx 9/03/20 confirms metastatic squamous cell carcinoma. No evidence of recurrence in head & neck region. \par Completed palliative radiation to the left lateral ribcage and C7-T3 on 10/01/20.\par He received Carbo AUC 5, Taxol 160 mg/m2, and pembrolizumab every 3 weeks x 4 cycles completed on 11/24/20. Xgeva every 6 weeks for bone metastasis. PET CT 12/04/20 shows improvement in prevascular nodules, stable liver lesion, and improved bone mets. No new disease. Started maintenance Keytruda every 3 weeks on 12/14/20.\par \par Feels more fatigued, past couple of weeks, having difficulty opening bottles, feels may be worsening of peripheral neuropathy. On 12/29/20 had MRI brain which was prescribed for new c/o dizziness, and shows no mass effect, acute cerebral edema or evidence of metastatic disease. He was seen by his PCP and meclizine was ordered. He does not feel it is helping. He is more fatigued today, and I am wondering if meclizine can be contributing. HR today is 120. He remains on Xarelto. \par \par \par Plan:\par - Proceed w/ maintenance Keytruda today and every 3 weeks.\par - Bone mets - continue Xgeva q 6 week\par - Tachycardia: states he has rapid HR for a very long time, remains on Metoprolol. Advised to see his cardiologist and he refuses. TSH 12/14 wnl's await today's result.\par - Neoplasm related pain - continue lidocaine patch to L chest wall\par - CIPN - has been grade 1, now w/ increased difficulty opening caps. No pain. Rx'd Alpha Lipoic Acid and Vit B6 for CIPN. Advised a consult w/ Dr. Cortes for neuropathy, he refuses at this time, secondary to not wanting to rely on others for transportation..\par - SW: refer to [employer] for transportation options.\par - Plan for restaging scans at end of March 2020\par

## 2021-01-06 NOTE — PHYSICAL EXAM
[Ambulatory and capable of all self care but unable to carry out any work activities] : Status 2- Ambulatory and capable of all self care but unable to carry out any work activities. Up and about more than 50% of waking hours [Normal] : affect appropriate [de-identified] : Pleasant, interactive in NAD. [de-identified] : negative cervical, supra/infraclavicular adenopathy. [de-identified] : CTA [de-identified] : S1S2,  tachy rate. [de-identified] : negative pedal edema or calve tenderness [de-identified] : BS+, soft, nontender on palpation. [de-identified] : without spinal or cva tenderness.

## 2021-01-06 NOTE — HISTORY OF PRESENT ILLNESS
[de-identified] : 80 year old gentleman diagnosed with synchronous H&N and lung cancer. He also has a history of RCC, s/p left total nephrectomy and s/p right partial nephrectomy for ?cyst. 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 Completed 6300 cGy radiation therapy to larynx on 1/2/2020. 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  No adjuvant therapy as he was not a candidate for Cisplatin due to solitary partial kidney, and no clear role for carboplatin based therapy in the adjuvant setting. \par \par \par On surveillance CT chest on 8/3/20 - IMPRESSION:\par Enlarging para-aortic lymph nodes suspicious for metastatic disease.\par Lytic destructive lesion within the right 8th rib (series 6 image 73) likely metastatic.\par 4 cm indistinct hypodense lesion within the right lobe of the liver, suspicious for metastatic disease. Further evaluation can be performed with PET-CT or MRI.\par \par He notes left sided chest wall pain since April 2020.\par No significant weight loss. NO SOB. NO headaches, or dizziness.  No changes in vision.\par  [de-identified] : Since last seen, denies fevers, SOB w/ minimal exertion, relieved w/ resting, no CP, appetite is fair, no constipation or diarrhea, no pain/burning w/ urination. No LE edema. Energy level is fair. Feels the numbness/tingling to hands may be a little increased. He finds it difficult now to remove a bottle cap. He feels generalized weakness, and is using a cane. The remainder of ROS is negative.\par

## 2021-01-21 ENCOUNTER — LABORATORY RESULT (OUTPATIENT)
Age: 81
End: 2021-01-21

## 2021-01-21 ENCOUNTER — APPOINTMENT (OUTPATIENT)
Dept: HEMATOLOGY ONCOLOGY | Facility: CLINIC | Age: 81
End: 2021-01-21

## 2021-01-22 ENCOUNTER — OUTPATIENT (OUTPATIENT)
Dept: OUTPATIENT SERVICES | Facility: HOSPITAL | Age: 81
LOS: 1 days | Discharge: HOME | End: 2021-01-22

## 2021-01-22 DIAGNOSIS — Z98.890 OTHER SPECIFIED POSTPROCEDURAL STATES: Chronic | ICD-10-CM

## 2021-01-22 DIAGNOSIS — C34.90 MALIGNANT NEOPLASM OF UNSPECIFIED PART OF UNSPECIFIED BRONCHUS OR LUNG: ICD-10-CM

## 2021-01-22 DIAGNOSIS — Z92.3 PERSONAL HISTORY OF IRRADIATION: Chronic | ICD-10-CM

## 2021-01-22 DIAGNOSIS — Z90.2 ACQUIRED ABSENCE OF LUNG [PART OF]: Chronic | ICD-10-CM

## 2021-01-26 ENCOUNTER — LABORATORY RESULT (OUTPATIENT)
Age: 81
End: 2021-01-26

## 2021-01-26 ENCOUNTER — APPOINTMENT (OUTPATIENT)
Age: 81
End: 2021-01-26

## 2021-01-26 ENCOUNTER — RESULT REVIEW (OUTPATIENT)
Age: 81
End: 2021-01-26

## 2021-01-26 LAB
BASOPHILS # BLD AUTO: 0 K/UL — SIGNIFICANT CHANGE UP (ref 0–0.2)
BASOPHILS NFR BLD AUTO: 0.6 % — SIGNIFICANT CHANGE UP (ref 0–2)
EOSINOPHIL # BLD AUTO: 0.2 K/UL — SIGNIFICANT CHANGE UP (ref 0–0.5)
EOSINOPHIL NFR BLD AUTO: 3.2 % — SIGNIFICANT CHANGE UP (ref 0–6)
HCT VFR BLD CALC: 37.9 % — LOW (ref 39–50)
HGB BLD-MCNC: 11.9 G/DL — LOW (ref 13–17)
LYMPHOCYTES # BLD AUTO: 0.6 K/UL — LOW (ref 1–3.3)
LYMPHOCYTES # BLD AUTO: 10.6 % — LOW (ref 13–44)
MCHC RBC-ENTMCNC: 29.9 PG — SIGNIFICANT CHANGE UP (ref 27–34)
MCHC RBC-ENTMCNC: 31.5 G/DL — LOW (ref 32–36)
MCV RBC AUTO: 95.1 FL — SIGNIFICANT CHANGE UP (ref 80–100)
MONOCYTES # BLD AUTO: 0.7 K/UL — SIGNIFICANT CHANGE UP (ref 0–0.9)
MONOCYTES NFR BLD AUTO: 11.6 % — SIGNIFICANT CHANGE UP (ref 2–14)
NEUTROPHILS # BLD AUTO: 4.5 K/UL — SIGNIFICANT CHANGE UP (ref 1.8–7.4)
NEUTROPHILS NFR BLD AUTO: 74 % — SIGNIFICANT CHANGE UP (ref 43–77)
PLATELET # BLD AUTO: 175 K/UL — SIGNIFICANT CHANGE UP (ref 150–400)
RBC # BLD: 3.99 M/UL — LOW (ref 4.2–5.8)
RBC # FLD: 13.1 % — SIGNIFICANT CHANGE UP (ref 10.3–14.5)
WBC # BLD: 6.1 K/UL — SIGNIFICANT CHANGE UP (ref 3.8–10.5)
WBC # FLD AUTO: 6.1 K/UL — SIGNIFICANT CHANGE UP (ref 3.8–10.5)

## 2021-01-27 DIAGNOSIS — C78.7 SECONDARY MALIGNANT NEOPLASM OF LIVER AND INTRAHEPATIC BILE DUCT: ICD-10-CM

## 2021-01-27 DIAGNOSIS — Z51.11 ENCOUNTER FOR ANTINEOPLASTIC CHEMOTHERAPY: ICD-10-CM

## 2021-01-27 DIAGNOSIS — C79.51 SECONDARY MALIGNANT NEOPLASM OF BONE: ICD-10-CM

## 2021-01-27 DIAGNOSIS — R11.2 NAUSEA WITH VOMITING, UNSPECIFIED: ICD-10-CM

## 2021-02-08 ENCOUNTER — APPOINTMENT (OUTPATIENT)
Dept: HEMATOLOGY ONCOLOGY | Facility: CLINIC | Age: 81
End: 2021-02-08
Payer: MEDICARE

## 2021-02-08 ENCOUNTER — RESULT REVIEW (OUTPATIENT)
Age: 81
End: 2021-02-08

## 2021-02-08 VITALS
HEART RATE: 94 BPM | BODY MASS INDEX: 27.25 KG/M2 | TEMPERATURE: 97.5 F | DIASTOLIC BLOOD PRESSURE: 85 MMHG | WEIGHT: 184 LBS | SYSTOLIC BLOOD PRESSURE: 131 MMHG | HEIGHT: 69 IN | OXYGEN SATURATION: 97 %

## 2021-02-08 LAB
BASOPHILS # BLD AUTO: 0 K/UL — SIGNIFICANT CHANGE UP (ref 0–0.2)
BASOPHILS NFR BLD AUTO: 0.4 % — SIGNIFICANT CHANGE UP (ref 0–2)
EOSINOPHIL # BLD AUTO: 0.2 K/UL — SIGNIFICANT CHANGE UP (ref 0–0.5)
EOSINOPHIL NFR BLD AUTO: 3.5 % — SIGNIFICANT CHANGE UP (ref 0–6)
HCT VFR BLD CALC: 39.3 % — SIGNIFICANT CHANGE UP (ref 39–50)
HGB BLD-MCNC: 12.1 G/DL — LOW (ref 13–17)
LYMPHOCYTES # BLD AUTO: 0.7 K/UL — LOW (ref 1–3.3)
LYMPHOCYTES # BLD AUTO: 10 % — LOW (ref 13–44)
MCHC RBC-ENTMCNC: 28.5 PG — SIGNIFICANT CHANGE UP (ref 27–34)
MCHC RBC-ENTMCNC: 30.8 G/DL — LOW (ref 32–36)
MCV RBC AUTO: 92.6 FL — SIGNIFICANT CHANGE UP (ref 80–100)
MONOCYTES # BLD AUTO: 0.7 K/UL — SIGNIFICANT CHANGE UP (ref 0–0.9)
MONOCYTES NFR BLD AUTO: 10.5 % — SIGNIFICANT CHANGE UP (ref 2–14)
NEUTROPHILS # BLD AUTO: 5.1 K/UL — SIGNIFICANT CHANGE UP (ref 1.8–7.4)
NEUTROPHILS NFR BLD AUTO: 75.6 % — SIGNIFICANT CHANGE UP (ref 43–77)
PLATELET # BLD AUTO: 192 K/UL — SIGNIFICANT CHANGE UP (ref 150–400)
RBC # BLD: 4.24 M/UL — SIGNIFICANT CHANGE UP (ref 4.2–5.8)
RBC # FLD: 13.2 % — SIGNIFICANT CHANGE UP (ref 10.3–14.5)
WBC # BLD: 6.7 K/UL — SIGNIFICANT CHANGE UP (ref 3.8–10.5)
WBC # FLD AUTO: 6.7 K/UL — SIGNIFICANT CHANGE UP (ref 3.8–10.5)

## 2021-02-08 PROCEDURE — 99214 OFFICE O/P EST MOD 30 MIN: CPT

## 2021-02-08 PROCEDURE — 99072 ADDL SUPL MATRL&STAF TM PHE: CPT

## 2021-02-08 RX ORDER — ALPRAZOLAM 0.5 MG/1
0.5 TABLET ORAL
Qty: 3 | Refills: 0 | Status: ACTIVE | COMMUNITY
Start: 2019-11-12 | End: 1900-01-01

## 2021-02-08 NOTE — ASSESSMENT
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 No adjuvant chemotherapy as he was poor Cisplatin candidate due to partial solitary kidney. PDL1 0% () on original path.  PET CT 8/18/20 shows new FDG avid hepatic lesions suspicious for metastatic disease and destructive lytic lesion left eighth rib and additional lesions left ninth rib and T1 spinous process, suspicious for metastatic disease. Liver core bx 9/03/20 confirms metastatic squamous cell carcinoma. No evidence of recurrence in head & neck region. \par Completed palliative radiation to the left lateral ribcage and C7-T3 on 10/01/20.\par He received Carbo AUC 5, Taxol 160 mg/m2, and pembrolizumab every 3 weeks x 4 cycles completed on 11/24/20. Xgeva every 6 weeks for bone metastasis. PET CT 12/04/20 shows improvement in prevascular nodules, stable liver lesion, and improved bone mets. No new disease. Started maintenance Keytruda every 3 weeks on 12/14/20.\par \par Feels more fatigued, past couple of weeks, having difficulty opening bottles, feels may be worsening of peripheral neuropathy. On 12/29/20 had MRI brain which was prescribed for new c/o dizziness, and shows no mass effect, acute cerebral edema or evidence of metastatic disease. He was seen by his PCP and meclizine was ordered. He does not feel it is helping. He is more fatigued today, and I am wondering if meclizine can be contributing. HR today is 120. He remains on Xarelto. \par \par \par Plan:\par - Proceed w/ maintenance Keytruda today and every 3 weeks.\par - Bone mets - continue Xgeva q 6 week\par - Tachycardia: remains on Metoprolol. Advised to see his cardiologist and he refuses. F/u TSH \par - Neoplasm related pain - continue lidocaine patch to L chest wall\par - CIPN - grade 1, now w/ increased difficulty opening caps. No pain. Rx'd Alpha Lipoic Acid and Vit B6 for CIPN. Advised a consult w/ Dr. Cortes for neuropathy, he refuses at this time, secondary to transportation issue. \par - SW: refer to [employer] for transportation options.\par - Plan for restaging scans at end of March 2020 and f/u early April with Dr. Zhu\par  [Palliative] : Goals of care discussed with patient: Palliative

## 2021-02-08 NOTE — RESULTS/DATA
[FreeTextEntry1] : 12/29/20 MRI brain:  JORGE ALBERTO\par \par 12/20/20 PET:  \par 1. FDG avid metastatic posterior right hepatic lobe lesion, not significantly changed. Additional FDG avid hepatic lesions, not definitely identified.\par 2. Decreased FDG avidity and size of prevascular lymph nodes.\par 3. New diffuse bone marrow hypermetabolism likely related to interval treatment. Previously noted FDG avidity in several bones, not identifiable. Decreased extraosseous soft tissue extension associated with lytic posterolateral left eighth rib lesion.\par 4. Unchanged subcentimeter right upper lobe pulmonary nodule, too small to characterize. Postsurgical changes of left upper lobectomy, again noted.\par 5. No definite FDG avid recurrent disease in the head and neck, although limited evaluation due to patient motion.\par

## 2021-02-08 NOTE — PHYSICAL EXAM
[Ambulatory and capable of all self care but unable to carry out any work activities] : Status 2- Ambulatory and capable of all self care but unable to carry out any work activities. Up and about more than 50% of waking hours [Normal] : affect appropriate [de-identified] : Pleasant, interactive in NAD. [de-identified] : negative cervical, supra/infraclavicular adenopathy. [de-identified] : CTA [de-identified] : S1S2,  tachy rate. [de-identified] : negative pedal edema or calve tenderness [de-identified] : BS+, soft, nontender on palpation. [de-identified] : without spinal or cva tenderness.

## 2021-02-08 NOTE — HISTORY OF PRESENT ILLNESS
[de-identified] : 80 year old gentleman diagnosed with synchronous H&N and lung cancer. He also has a history of RCC, s/p left total nephrectomy and s/p right partial nephrectomy for ?cyst. 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 Completed 6300 cGy radiation therapy to larynx on 1/2/2020. 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  No adjuvant therapy as he was not a candidate for Cisplatin due to solitary partial kidney, and no clear role for carboplatin based therapy in the adjuvant setting. \par \par \par On surveillance CT chest on 8/3/20 - IMPRESSION:\par Enlarging para-aortic lymph nodes suspicious for metastatic disease.\par Lytic destructive lesion within the right 8th rib (series 6 image 73) likely metastatic.\par 4 cm indistinct hypodense lesion within the right lobe of the liver, suspicious for metastatic disease. Further evaluation can be performed with PET-CT or MRI.\par \par He notes left sided chest wall pain since April 2020.\par No significant weight loss. NO SOB. NO headaches, or dizziness.  No changes in vision.\par  [de-identified] : Since last seen, denies fevers, SOB w/ minimal exertion, relieved w/ resting, no CP, appetite is fair, no constipation or diarrhea, no pain/burning w/ urination. No LE edema. Energy level is fair. Feels the numbness/tingling to hands may be a little increased. He finds it difficult now to remove a bottle cap. He feels generalized weakness, and is using a cane. The remainder of ROS is negative.\par

## 2021-02-09 LAB
ALBUMIN SERPL ELPH-MCNC: 3.8 G/DL
ALP BLD-CCNC: 172 U/L
ALT SERPL-CCNC: 18 U/L
ANION GAP SERPL CALC-SCNC: 12 MMOL/L
AST SERPL-CCNC: 18 U/L
BILIRUB SERPL-MCNC: 0.3 MG/DL
BUN SERPL-MCNC: 18 MG/DL
CALCIUM SERPL-MCNC: 8.5 MG/DL
CHLORIDE SERPL-SCNC: 101 MMOL/L
CO2 SERPL-SCNC: 24 MMOL/L
CREAT SERPL-MCNC: 1.46 MG/DL
GLUCOSE SERPL-MCNC: 150 MG/DL
MAGNESIUM SERPL-MCNC: 2.1 MG/DL
POTASSIUM SERPL-SCNC: 4.5 MMOL/L
PROT SERPL-MCNC: 6.9 G/DL
SODIUM SERPL-SCNC: 137 MMOL/L

## 2021-02-16 ENCOUNTER — RESULT REVIEW (OUTPATIENT)
Age: 81
End: 2021-02-16

## 2021-02-16 ENCOUNTER — LABORATORY RESULT (OUTPATIENT)
Age: 81
End: 2021-02-16

## 2021-02-16 ENCOUNTER — APPOINTMENT (OUTPATIENT)
Age: 81
End: 2021-02-16

## 2021-02-16 LAB
BASOPHILS # BLD AUTO: 0 K/UL — SIGNIFICANT CHANGE UP (ref 0–0.2)
BASOPHILS NFR BLD AUTO: 0.7 % — SIGNIFICANT CHANGE UP (ref 0–2)
EOSINOPHIL # BLD AUTO: 0.2 K/UL — SIGNIFICANT CHANGE UP (ref 0–0.5)
EOSINOPHIL NFR BLD AUTO: 2.4 % — SIGNIFICANT CHANGE UP (ref 0–6)
HCT VFR BLD CALC: 38.9 % — LOW (ref 39–50)
HGB BLD-MCNC: 12.1 G/DL — LOW (ref 13–17)
LYMPHOCYTES # BLD AUTO: 0.6 K/UL — LOW (ref 1–3.3)
LYMPHOCYTES # BLD AUTO: 8 % — LOW (ref 13–44)
MCHC RBC-ENTMCNC: 28.9 PG — SIGNIFICANT CHANGE UP (ref 27–34)
MCHC RBC-ENTMCNC: 31.1 G/DL — LOW (ref 32–36)
MCV RBC AUTO: 93 FL — SIGNIFICANT CHANGE UP (ref 80–100)
MONOCYTES # BLD AUTO: 0.8 K/UL — SIGNIFICANT CHANGE UP (ref 0–0.9)
MONOCYTES NFR BLD AUTO: 10.5 % — SIGNIFICANT CHANGE UP (ref 2–14)
NEUTROPHILS # BLD AUTO: 5.6 K/UL — SIGNIFICANT CHANGE UP (ref 1.8–7.4)
NEUTROPHILS NFR BLD AUTO: 78.4 % — HIGH (ref 43–77)
PLATELET # BLD AUTO: 208 K/UL — SIGNIFICANT CHANGE UP (ref 150–400)
RBC # BLD: 4.18 M/UL — LOW (ref 4.2–5.8)
RBC # FLD: 13.2 % — SIGNIFICANT CHANGE UP (ref 10.3–14.5)
WBC # BLD: 7.2 K/UL — SIGNIFICANT CHANGE UP (ref 3.8–10.5)
WBC # FLD AUTO: 7.2 K/UL — SIGNIFICANT CHANGE UP (ref 3.8–10.5)

## 2021-02-22 ENCOUNTER — APPOINTMENT (OUTPATIENT)
Dept: HEMATOLOGY ONCOLOGY | Facility: CLINIC | Age: 81
End: 2021-02-22

## 2021-02-22 NOTE — H&P PST ADULT - BACK
Assessment & Plan     Acute non-recurrent frontal sinusitis  Treat with Augmentin to cover the nasolacrimal duct as well.   Follow up if not improving    Chronic pain of left knee  This appears to be a strain of the lateral collateral ligament  Advised using ibuprofen 600  Mg tid with food for 2 weeks  Will obtain x rays today as well   - XR Knee Left 3 Views (Clinic Performed); Future  - amoxicillin-clavulanate (AUGMENTIN) 875-125 MG tablet; Take 1 tablet by mouth 2 times daily for 10 days      15 minutes spent on the date of the encounter doing chart review, review of test results, patient visit and documentation            Return if symptoms worsen or fail to improve.    Kassandra Chaparro MD  [facility] - BHAVNA Palmer is a 61 year old who presents for the following health issues     HPI       Acute Illness  Acute illness concerns: sinus infection  Onset/Duration: about a month off and on   Symptoms:  Fever: no  Chills/Sweats: no  Headache (location?): YES  Sinus Pressure: YES  Conjunctivitis:  no  Ear Pain: YES: bilateral  Rhinorrhea: YES  Congestion: YES  Sore Throat: no  Cough: no  Wheeze: no  Decreased Appetite: no  Nausea: no  Vomiting: no  Diarrhea: no  Dysuria/Freq.: no  Dysuria or Hematuria: no  Fatigue/Achiness: no  Sick/Strep Exposure: no  Therapies tried and outcome: None  Her nasolacrimal duct on the left has been irritated but not draining. She had a significant infection in the duct in the past     Musculoskeletal problem/pain  Onset/Duration: quite some time  Description  Location: knee - left  Joint Swelling: no  Redness: no  Pain: YES  Warmth: no  Intensity:  mild  Progression of Symptoms:  same  Accompanying signs and symptoms:   Fevers: no  Numbness/tingling/weakness: no  History  Trauma to the area: no  Recent illness:  no  Previous similar problem: no  Previous evaluation:  no  Precipitating or alleviating factors:  Aggravating factors include: kneeling down or  "walking up the stairs  Therapies tried and outcome: nothing    No trauma to the knee       Review of Systems   Constitutional, HEENT, cardiovascular, pulmonary, gi and gu systems are negative, except as otherwise noted.      Objective    /74   Pulse 79   Temp 97  F (36.1  C) (Tympanic)   Resp 19   Ht 1.727 m (5' 8\")   Wt 58.1 kg (128 lb)   SpO2 99%   BMI 19.46 kg/m    Body mass index is 19.46 kg/m .  Physical Exam   GENERAL APPEARANCE: healthy, alert and no distress  EYES: Eyes grossly normal to inspection, PERRL and conjunctivae and sclerae normal  HENT: ear canals and TM's normal  NECK: no adenopathy, no asymmetry, masses, or scars and thyroid normal to palpation  RESP: lungs clear to auscultation - no rales, rhonchi or wheezes  CV: regular rates and rhythm, normal S1 S2, no S3 or S4 and no murmur, click or rub  ORTHO: left Knee Exam: Inspection: AP/lateral alignment normal  Tender: lateral joint line  Non-tender: medial joint line, medial tibial plateau, lateral tibial plateau, medial femoral condyle, lateral femoral condyle  Active Range of Motion: full flexion, full extension  Strength: intact throughout   Special tests: normal Valgus stress test, normal Varus    SKIN: no suspicious lesions or rashes  NEURO: Normal strength and tone, mentation intact and speech normal  PSYCH: mentation appears normal and affect normal/bright                " detailed exam

## 2021-02-23 ENCOUNTER — APPOINTMENT (OUTPATIENT)
Dept: ENDOCRINOLOGY | Facility: CLINIC | Age: 81
End: 2021-02-23

## 2021-03-03 ENCOUNTER — LABORATORY RESULT (OUTPATIENT)
Age: 81
End: 2021-03-03

## 2021-03-03 ENCOUNTER — OUTPATIENT (OUTPATIENT)
Dept: OUTPATIENT SERVICES | Facility: HOSPITAL | Age: 81
LOS: 1 days | Discharge: HOME | End: 2021-03-03

## 2021-03-03 ENCOUNTER — APPOINTMENT (OUTPATIENT)
Dept: HEMATOLOGY ONCOLOGY | Facility: CLINIC | Age: 81
End: 2021-03-03

## 2021-03-03 DIAGNOSIS — Z98.890 OTHER SPECIFIED POSTPROCEDURAL STATES: Chronic | ICD-10-CM

## 2021-03-03 DIAGNOSIS — C34.90 MALIGNANT NEOPLASM OF UNSPECIFIED PART OF UNSPECIFIED BRONCHUS OR LUNG: ICD-10-CM

## 2021-03-03 DIAGNOSIS — Z90.2 ACQUIRED ABSENCE OF LUNG [PART OF]: Chronic | ICD-10-CM

## 2021-03-03 DIAGNOSIS — Z92.3 PERSONAL HISTORY OF IRRADIATION: Chronic | ICD-10-CM

## 2021-03-08 ENCOUNTER — APPOINTMENT (OUTPATIENT)
Dept: OTOLARYNGOLOGY | Facility: CLINIC | Age: 81
End: 2021-03-08
Payer: MEDICARE

## 2021-03-08 VITALS — HEART RATE: 63 BPM | DIASTOLIC BLOOD PRESSURE: 83 MMHG | SYSTOLIC BLOOD PRESSURE: 149 MMHG

## 2021-03-08 DIAGNOSIS — C32.0 MALIGNANT NEOPLASM OF GLOTTIS: ICD-10-CM

## 2021-03-08 DIAGNOSIS — C78.7 SECONDARY MALIGNANT NEOPLASM OF LIVER AND INTRAHEPATIC BILE DUCT: ICD-10-CM

## 2021-03-08 PROCEDURE — 99214 OFFICE O/P EST MOD 30 MIN: CPT | Mod: 25

## 2021-03-08 PROCEDURE — 99072 ADDL SUPL MATRL&STAF TM PHE: CPT

## 2021-03-08 PROCEDURE — 31575 DIAGNOSTIC LARYNGOSCOPY: CPT

## 2021-03-08 NOTE — PROCEDURE
[None] : none [Flexible Endoscope] : examined with the flexible endoscope [Serial Number: ___] : Serial Number: [unfilled] [de-identified] : No lesions in the NPx, OPx, HPx or larynx.  Stable posttreatment changes, VC are mobile, airway patent.\par  [de-identified] : Glottic SCCa

## 2021-03-08 NOTE — HISTORY OF PRESENT ILLNESS
[de-identified] : 81 y/o male who completed radiation tx for VC SCCa with Dr. Loaiza on 1/2/2020, last PET ct 12/4/2020,  But the lung cancer mets to the liver and bone and completed Radiation and chemo and now on immunotherapy 12/14/2020. pt is here for f/up, pt has mucus in the throat and no c.o in the throat area, + fatigue.  Pt denies of  sob, dysphonia, dysphagia, pain, otalgia.  His weight is stable.  He denies any hemoptysis. \par \par

## 2021-03-09 ENCOUNTER — RESULT REVIEW (OUTPATIENT)
Age: 81
End: 2021-03-09

## 2021-03-09 ENCOUNTER — LABORATORY RESULT (OUTPATIENT)
Age: 81
End: 2021-03-09

## 2021-03-09 ENCOUNTER — APPOINTMENT (OUTPATIENT)
Age: 81
End: 2021-03-09

## 2021-03-09 LAB
BASOPHILS # BLD AUTO: 0.1 K/UL — SIGNIFICANT CHANGE UP (ref 0–0.2)
BASOPHILS NFR BLD AUTO: 1.3 % — SIGNIFICANT CHANGE UP (ref 0–2)
EOSINOPHIL # BLD AUTO: 0.2 K/UL — SIGNIFICANT CHANGE UP (ref 0–0.5)
EOSINOPHIL NFR BLD AUTO: 3.4 % — SIGNIFICANT CHANGE UP (ref 0–6)
HCT VFR BLD CALC: 35.8 % — LOW (ref 39–50)
HGB BLD-MCNC: 11.6 G/DL — LOW (ref 13–17)
LYMPHOCYTES # BLD AUTO: 0.6 K/UL — LOW (ref 1–3.3)
LYMPHOCYTES # BLD AUTO: 8.5 % — LOW (ref 13–44)
MCHC RBC-ENTMCNC: 28.7 PG — SIGNIFICANT CHANGE UP (ref 27–34)
MCHC RBC-ENTMCNC: 32.4 G/DL — SIGNIFICANT CHANGE UP (ref 32–36)
MCV RBC AUTO: 88.8 FL — SIGNIFICANT CHANGE UP (ref 80–100)
MONOCYTES # BLD AUTO: 0.6 K/UL — SIGNIFICANT CHANGE UP (ref 0–0.9)
MONOCYTES NFR BLD AUTO: 8.1 % — SIGNIFICANT CHANGE UP (ref 2–14)
NEUTROPHILS # BLD AUTO: 5.6 K/UL — SIGNIFICANT CHANGE UP (ref 1.8–7.4)
NEUTROPHILS NFR BLD AUTO: 78.8 % — HIGH (ref 43–77)
PLATELET # BLD AUTO: 178 K/UL — SIGNIFICANT CHANGE UP (ref 150–400)
RBC # BLD: 4.03 M/UL — LOW (ref 4.2–5.8)
RBC # FLD: 13.6 % — SIGNIFICANT CHANGE UP (ref 10.3–14.5)
WBC # BLD: 7.1 K/UL — SIGNIFICANT CHANGE UP (ref 3.8–10.5)
WBC # FLD AUTO: 7.1 K/UL — SIGNIFICANT CHANGE UP (ref 3.8–10.5)

## 2021-03-09 RX ORDER — LANSOPRAZOLE 15 MG/1
0 CAPSULE, DELAYED RELEASE ORAL
Qty: 90 | Refills: 0 | DISCHARGE

## 2021-03-09 RX ORDER — TRAMADOL HYDROCHLORIDE 50 MG/1
1 TABLET ORAL
Qty: 0 | Refills: 0 | DISCHARGE

## 2021-03-09 RX ORDER — METOPROLOL TARTRATE 50 MG
1 TABLET ORAL
Qty: 0 | Refills: 0 | DISCHARGE

## 2021-03-09 RX ORDER — ALLOPURINOL 300 MG
1 TABLET ORAL
Qty: 0 | Refills: 0 | DISCHARGE

## 2021-03-09 RX ORDER — ALPRAZOLAM 0.25 MG
1 TABLET ORAL
Qty: 0 | Refills: 0 | DISCHARGE

## 2021-03-09 RX ORDER — FONDAPARINUX SODIUM 2.5 MG/.5ML
1 INJECTION, SOLUTION SUBCUTANEOUS
Qty: 0 | Refills: 0 | DISCHARGE

## 2021-03-10 DIAGNOSIS — C79.51 SECONDARY MALIGNANT NEOPLASM OF BONE: ICD-10-CM

## 2021-03-10 DIAGNOSIS — C78.7 SECONDARY MALIGNANT NEOPLASM OF LIVER AND INTRAHEPATIC BILE DUCT: ICD-10-CM

## 2021-03-11 ENCOUNTER — APPOINTMENT (OUTPATIENT)
Dept: HEMATOLOGY ONCOLOGY | Facility: CLINIC | Age: 81
End: 2021-03-11
Payer: MEDICARE

## 2021-03-11 VITALS
HEIGHT: 69 IN | SYSTOLIC BLOOD PRESSURE: 115 MMHG | HEART RATE: 93 BPM | BODY MASS INDEX: 26.99 KG/M2 | OXYGEN SATURATION: 97 % | WEIGHT: 182.25 LBS | DIASTOLIC BLOOD PRESSURE: 69 MMHG

## 2021-03-11 DIAGNOSIS — C34.90 MALIGNANT NEOPLASM OF UNSPECIFIED PART OF UNSPECIFIED BRONCHUS OR LUNG: ICD-10-CM

## 2021-03-11 DIAGNOSIS — C79.51 SECONDARY MALIGNANT NEOPLASM OF BONE: ICD-10-CM

## 2021-03-11 DIAGNOSIS — F32.9 MAJOR DEPRESSIVE DISORDER, SINGLE EPISODE, UNSPECIFIED: ICD-10-CM

## 2021-03-11 PROCEDURE — 99072 ADDL SUPL MATRL&STAF TM PHE: CPT

## 2021-03-11 PROCEDURE — 99215 OFFICE O/P EST HI 40 MIN: CPT

## 2021-03-11 NOTE — PHYSICAL EXAM
[Ambulatory and capable of all self care but unable to carry out any work activities] : Status 2- Ambulatory and capable of all self care but unable to carry out any work activities. Up and about more than 50% of waking hours [Normal] : affect appropriate [de-identified] : Pleasant, interactive in NAD. [de-identified] : negative cervical, supra/infraclavicular adenopathy. [de-identified] : CTA [de-identified] : S1S2,  tachy rate. [de-identified] : negative pedal edema or calve tenderness [de-identified] : BS+, soft, nontender on palpation. [de-identified] : without spinal or cva tenderness.

## 2021-03-11 NOTE — HISTORY OF PRESENT ILLNESS
[de-identified] : 80 year old gentleman diagnosed with synchronous H&N and lung cancer. He also has a history of RCC, s/p left total nephrectomy and s/p right partial nephrectomy for ?cyst. 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 Completed 6300 cGy radiation therapy to larynx on 1/2/2020. 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  No adjuvant therapy as he was not a candidate for Cisplatin due to solitary partial kidney, and no clear role for carboplatin based therapy in the adjuvant setting. \par \par \par On surveillance CT chest on 8/3/20 - IMPRESSION:\par Enlarging para-aortic lymph nodes suspicious for metastatic disease.\par Lytic destructive lesion within the right 8th rib (series 6 image 73) likely metastatic.\par 4 cm indistinct hypodense lesion within the right lobe of the liver, suspicious for metastatic disease. Further evaluation can be performed with PET-CT or MRI.\par \par He notes left sided chest wall pain since April 2020.\par No significant weight loss. NO SOB. NO headaches, or dizziness.  No changes in vision.\par  [de-identified] : Since last seen, denies fevers, SOB w/ minimal exertion, relieved w/ resting, no CP, appetite is fair, no constipation or diarrhea, no pain/burning w/ urination. No LE edema. Energy level is fair. Feels the numbness/tingling to hands may be a little increased. He finds it difficult now to remove a bottle cap. He feels generalized weakness, and is using a cane. The remainder of ROS is negative.\par

## 2021-03-11 NOTE — ASSESSMENT
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 No adjuvant chemotherapy as he was poor Cisplatin candidate due to partial solitary kidney. PDL1 0% () on original path.  PET CT 8/18/20 shows new FDG avid hepatic lesions suspicious for metastatic disease and destructive lytic lesion left eighth rib and additional lesions left ninth rib and T1 spinous process, suspicious for metastatic disease. Liver core bx 9/03/20 confirms metastatic squamous cell carcinoma. No evidence of recurrence in head & neck region. \par Completed palliative radiation to the left lateral ribcage and C7-T3 on 10/01/20.\par He received Carbo AUC 5, Taxol 160 mg/m2, and pembrolizumab every 3 weeks x 4 cycles completed on 11/24/20. Xgeva every 6 weeks for bone metastasis. PET CT 12/04/20 shows improvement in prevascular nodules, stable liver lesion, and improved bone mets. No new disease. Started maintenance Keytruda every 3 weeks on 12/14/20.\par \par Feels more fatigued, past couple of weeks, having difficulty opening bottles, feels may be worsening of peripheral neuropathy. On 12/29/20 had MRI brain which was prescribed for new c/o dizziness, and shows no mass effect, acute cerebral edema or evidence of metastatic disease. He was seen by his PCP and meclizine was ordered. He does not feel it is helping. He is more fatigued today, and I am wondering if meclizine can be contributing. HR today is 120. He remains on Xarelto. \par \par \par Plan:\par - Proceed w/ maintenance Keytruda today and every 3 weeks.\par - Bone mets - continue Xgeva q 6 week\par - Tachycardia: remains on Metoprolol. Advised to see his cardiologist and he refuses. \par - Elevated TSH / scheduled with endocrine -will move up visit\par - Girlfriend and patient discussed 2 month hx of depression and wish to start an antidepressant.  Conferred with PCP Dr. James today, who recommended starting zoloft. 25 mg po qd.  She will send a short supply and he will f/u with Dr. Jean.  \par - Neoplasm related pain - continue lidocaine patch to L chest wall\par - CIPN - grade 1, now w/ increased difficulty opening caps. No pain. Rx'd Alpha Lipoic Acid and Vit B6 for CIPN. Advised a consult w/ Dr. Cortes for neuropathy, he refuses at this time, secondary to transportation issue. \par - SW: refer to [employer] for transportation options.\par - Plan for restaging scans at end of March 2020 and f/u early April with Dr. Zhu\par

## 2021-03-11 NOTE — CONSULT LETTER
[Dear  ___] : Dear  [unfilled], [Consult Letter:] : I had the pleasure of evaluating your patient, [unfilled]. [Please see my note below.] : Please see my note below. [Consult Closing:] : Thank you very much for allowing me to participate in the care of this patient.  If you have any questions, please do not hesitate to contact me. [Sincerely,] : Sincerely, [FreeTextEntry3] : Maribel Traore M.D.\par  [DrElo  ___] : Dr. POLLOCK

## 2021-03-30 ENCOUNTER — LABORATORY RESULT (OUTPATIENT)
Age: 81
End: 2021-03-30

## 2021-03-30 ENCOUNTER — APPOINTMENT (OUTPATIENT)
Age: 81
End: 2021-03-30

## 2021-03-30 ENCOUNTER — RESULT REVIEW (OUTPATIENT)
Age: 81
End: 2021-03-30

## 2021-03-30 LAB
ANISOCYTOSIS BLD QL: SLIGHT — SIGNIFICANT CHANGE UP
BASOPHILS # BLD AUTO: 0 K/UL — SIGNIFICANT CHANGE UP (ref 0–0.2)
EOSINOPHIL # BLD AUTO: 0 K/UL — SIGNIFICANT CHANGE UP (ref 0–0.5)
HCT VFR BLD CALC: 36.5 % — LOW (ref 39–50)
HGB BLD-MCNC: 11.3 G/DL — LOW (ref 13–17)
LG PLATELETS BLD QL AUTO: SLIGHT — SIGNIFICANT CHANGE UP
LYMPHOCYTES # BLD AUTO: 0.5 K/UL — LOW (ref 1–3.3)
LYMPHOCYTES # BLD AUTO: 5 % — LOW (ref 13–44)
MACROCYTES BLD QL: SLIGHT — SIGNIFICANT CHANGE UP
MCHC RBC-ENTMCNC: 27.1 PG — SIGNIFICANT CHANGE UP (ref 27–34)
MCHC RBC-ENTMCNC: 30.9 G/DL — LOW (ref 32–36)
MCV RBC AUTO: 87.6 FL — SIGNIFICANT CHANGE UP (ref 80–100)
MICROCYTES BLD QL: SLIGHT — SIGNIFICANT CHANGE UP
MONOCYTES # BLD AUTO: 0.6 K/UL — SIGNIFICANT CHANGE UP (ref 0–0.9)
MONOCYTES NFR BLD AUTO: 11 % — SIGNIFICANT CHANGE UP (ref 2–14)
MYELOCYTES NFR BLD: 2 % — HIGH (ref 0–0)
NEUTROPHILS # BLD AUTO: 3.9 K/UL — SIGNIFICANT CHANGE UP (ref 1.8–7.4)
NEUTROPHILS NFR BLD AUTO: 81 % — HIGH (ref 43–77)
NEUTS BAND # BLD: 1 % — SIGNIFICANT CHANGE UP (ref 0–8)
PLAT MORPH BLD: NORMAL — SIGNIFICANT CHANGE UP
PLATELET # BLD AUTO: 163 K/UL — SIGNIFICANT CHANGE UP (ref 150–400)
POIKILOCYTOSIS BLD QL AUTO: SLIGHT — SIGNIFICANT CHANGE UP
RBC # BLD: 4.17 M/UL — LOW (ref 4.2–5.8)
RBC # FLD: 14.2 % — SIGNIFICANT CHANGE UP (ref 10.3–14.5)
RBC BLD AUTO: SIGNIFICANT CHANGE UP
WBC # BLD: 5 K/UL — SIGNIFICANT CHANGE UP (ref 3.8–10.5)
WBC # FLD AUTO: 5 K/UL — SIGNIFICANT CHANGE UP (ref 3.8–10.5)

## 2021-03-31 DIAGNOSIS — Z51.11 ENCOUNTER FOR ANTINEOPLASTIC CHEMOTHERAPY: ICD-10-CM

## 2021-04-01 ENCOUNTER — APPOINTMENT (OUTPATIENT)
Dept: CT IMAGING | Facility: CLINIC | Age: 81
End: 2021-04-01

## 2021-04-02 ENCOUNTER — INPATIENT (INPATIENT)
Facility: HOSPITAL | Age: 81
LOS: 1 days | Discharge: HOME | DRG: 177 | End: 2021-04-04
Attending: STUDENT IN AN ORGANIZED HEALTH CARE EDUCATION/TRAINING PROGRAM | Admitting: INTERNAL MEDICINE
Payer: MEDICARE

## 2021-04-02 ENCOUNTER — NON-APPOINTMENT (OUTPATIENT)
Age: 81
End: 2021-04-02

## 2021-04-02 ENCOUNTER — OUTPATIENT (OUTPATIENT)
Dept: OUTPATIENT SERVICES | Facility: HOSPITAL | Age: 81
LOS: 1 days | Discharge: HOME | End: 2021-04-02

## 2021-04-02 VITALS
WEIGHT: 171.96 LBS | TEMPERATURE: 98 F | RESPIRATION RATE: 14 BRPM | HEIGHT: 70 IN | OXYGEN SATURATION: 99 % | DIASTOLIC BLOOD PRESSURE: 82 MMHG | SYSTOLIC BLOOD PRESSURE: 131 MMHG | HEART RATE: 114 BPM

## 2021-04-02 DIAGNOSIS — C78.7 SECONDARY MALIGNANT NEOPLASM OF LIVER AND INTRAHEPATIC BILE DUCT: ICD-10-CM

## 2021-04-02 DIAGNOSIS — R09.89 OTHER SPECIFIED SYMPTOMS AND SIGNS INVOLVING THE CIRCULATORY AND RESPIRATORY SYSTEMS: ICD-10-CM

## 2021-04-02 DIAGNOSIS — Z98.890 OTHER SPECIFIED POSTPROCEDURAL STATES: Chronic | ICD-10-CM

## 2021-04-02 DIAGNOSIS — Z90.2 ACQUIRED ABSENCE OF LUNG [PART OF]: Chronic | ICD-10-CM

## 2021-04-02 DIAGNOSIS — C34.90 MALIGNANT NEOPLASM OF UNSPECIFIED PART OF UNSPECIFIED BRONCHUS OR LUNG: ICD-10-CM

## 2021-04-02 DIAGNOSIS — Z92.3 PERSONAL HISTORY OF IRRADIATION: Chronic | ICD-10-CM

## 2021-04-02 DIAGNOSIS — J18.9 PNEUMONIA, UNSPECIFIED ORGANISM: ICD-10-CM

## 2021-04-02 DIAGNOSIS — C79.51 SECONDARY MALIGNANT NEOPLASM OF BONE: ICD-10-CM

## 2021-04-02 LAB
ALBUMIN SERPL ELPH-MCNC: 3.5 G/DL — SIGNIFICANT CHANGE UP (ref 3.3–5.2)
ALP SERPL-CCNC: 202 U/L — HIGH (ref 40–120)
ALT FLD-CCNC: 23 U/L — SIGNIFICANT CHANGE UP
ANION GAP SERPL CALC-SCNC: 17 MMOL/L — SIGNIFICANT CHANGE UP (ref 5–17)
ANISOCYTOSIS BLD QL: SLIGHT — SIGNIFICANT CHANGE UP
APPEARANCE UR: CLEAR — SIGNIFICANT CHANGE UP
APTT BLD: 29.5 SEC — SIGNIFICANT CHANGE UP (ref 27.5–35.5)
AST SERPL-CCNC: 62 U/L — HIGH
BACTERIA # UR AUTO: ABNORMAL
BASE EXCESS BLDV CALC-SCNC: -1.7 MMOL/L — SIGNIFICANT CHANGE UP (ref -2–2)
BASE EXCESS BLDV CALC-SCNC: -2.2 MMOL/L — LOW (ref -2–2)
BASOPHILS # BLD AUTO: 0 K/UL — SIGNIFICANT CHANGE UP (ref 0–0.2)
BASOPHILS NFR BLD AUTO: 0 % — SIGNIFICANT CHANGE UP (ref 0–2)
BILIRUB SERPL-MCNC: 0.6 MG/DL — SIGNIFICANT CHANGE UP (ref 0.4–2)
BILIRUB UR-MCNC: NEGATIVE — SIGNIFICANT CHANGE UP
BUN SERPL-MCNC: 25 MG/DL — HIGH (ref 8–20)
CA-I SERPL-SCNC: 0.93 MMOL/L — LOW (ref 1.15–1.33)
CA-I SERPL-SCNC: 0.95 MMOL/L — LOW (ref 1.15–1.33)
CALCIUM SERPL-MCNC: 7.7 MG/DL — LOW (ref 8.6–10.2)
CHLORIDE BLDV-SCNC: 97 MMOL/L — LOW (ref 98–107)
CHLORIDE BLDV-SCNC: 99 MMOL/L — SIGNIFICANT CHANGE UP (ref 98–107)
CHLORIDE SERPL-SCNC: 93 MMOL/L — LOW (ref 98–107)
CO2 SERPL-SCNC: 20 MMOL/L — LOW (ref 22–29)
COLOR SPEC: YELLOW — SIGNIFICANT CHANGE UP
COMMENT - URINE: SIGNIFICANT CHANGE UP
CREAT SERPL-MCNC: 1.32 MG/DL — HIGH (ref 0.5–1.3)
D DIMER BLD IA.RAPID-MCNC: 632 NG/ML DDU — HIGH
DACRYOCYTES BLD QL SMEAR: SLIGHT — SIGNIFICANT CHANGE UP
DIFF PNL FLD: ABNORMAL
EOSINOPHIL # BLD AUTO: 0 K/UL — SIGNIFICANT CHANGE UP (ref 0–0.5)
EOSINOPHIL NFR BLD AUTO: 0 % — SIGNIFICANT CHANGE UP (ref 0–6)
EPI CELLS # UR: SIGNIFICANT CHANGE UP
GAS PNL BLDV: 129 MMOL/L — LOW (ref 135–145)
GAS PNL BLDV: 132 MMOL/L — LOW (ref 135–145)
GAS PNL BLDV: SIGNIFICANT CHANGE UP
GIANT PLATELETS BLD QL SMEAR: PRESENT — SIGNIFICANT CHANGE UP
GLUCOSE BLDV-MCNC: 132 MG/DL — HIGH (ref 70–99)
GLUCOSE BLDV-MCNC: 135 MG/DL — HIGH (ref 70–99)
GLUCOSE SERPL-MCNC: 135 MG/DL — HIGH (ref 70–99)
GLUCOSE UR QL: NEGATIVE MG/DL — SIGNIFICANT CHANGE UP
GRAN CASTS # UR COMP ASSIST: ABNORMAL /LPF
HCO3 BLDV-SCNC: 22 MMOL/L — SIGNIFICANT CHANGE UP (ref 20–26)
HCO3 BLDV-SCNC: 22 MMOL/L — SIGNIFICANT CHANGE UP (ref 20–26)
HCT VFR BLD CALC: 37.6 % — LOW (ref 39–50)
HCT VFR BLDA CALC: 30 — LOW (ref 39–50)
HCT VFR BLDA CALC: 38 — LOW (ref 39–50)
HGB BLD CALC-MCNC: 12.6 G/DL — LOW (ref 13–17)
HGB BLD CALC-MCNC: 9.7 G/DL — LOW (ref 13–17)
HGB BLD-MCNC: 12.3 G/DL — LOW (ref 13–17)
HYALINE CASTS # UR AUTO: ABNORMAL /LPF
INR BLD: 1.53 RATIO — HIGH (ref 0.88–1.16)
KETONES UR-MCNC: ABNORMAL
LACTATE BLDV-MCNC: 2.4 MMOL/L — HIGH (ref 0.5–2)
LACTATE BLDV-MCNC: 2.5 MMOL/L — HIGH (ref 0.5–2)
LEUKOCYTE ESTERASE UR-ACNC: NEGATIVE — SIGNIFICANT CHANGE UP
LYMPHOCYTES # BLD AUTO: 0.45 K/UL — LOW (ref 1–3.3)
LYMPHOCYTES # BLD AUTO: 8.7 % — LOW (ref 13–44)
MANUAL SMEAR VERIFICATION: SIGNIFICANT CHANGE UP
MCHC RBC-ENTMCNC: 27.3 PG — SIGNIFICANT CHANGE UP (ref 27–34)
MCHC RBC-ENTMCNC: 32.7 GM/DL — SIGNIFICANT CHANGE UP (ref 32–36)
MCV RBC AUTO: 83.4 FL — SIGNIFICANT CHANGE UP (ref 80–100)
MICROCYTES BLD QL: SLIGHT — SIGNIFICANT CHANGE UP
MONOCYTES # BLD AUTO: 0.76 K/UL — SIGNIFICANT CHANGE UP (ref 0–0.9)
MONOCYTES NFR BLD AUTO: 14.8 % — HIGH (ref 2–14)
NEUTROPHILS # BLD AUTO: 3.84 K/UL — SIGNIFICANT CHANGE UP (ref 1.8–7.4)
NEUTROPHILS NFR BLD AUTO: 74.8 % — SIGNIFICANT CHANGE UP (ref 43–77)
NITRITE UR-MCNC: NEGATIVE — SIGNIFICANT CHANGE UP
OTHER CELLS CSF MANUAL: 11 ML/DL — LOW (ref 18–22)
OTHER CELLS CSF MANUAL: 11 ML/DL — LOW (ref 18–22)
OVALOCYTES BLD QL SMEAR: SLIGHT — SIGNIFICANT CHANGE UP
PCO2 BLDV: 41 MMHG — LOW (ref 42–55)
PCO2 BLDV: 43 MMHG — SIGNIFICANT CHANGE UP (ref 42–55)
PH BLDV: 7.36 — SIGNIFICANT CHANGE UP (ref 7.32–7.43)
PH BLDV: 7.36 — SIGNIFICANT CHANGE UP (ref 7.32–7.43)
PH UR: 6 — SIGNIFICANT CHANGE UP (ref 5–8)
PLAT MORPH BLD: NORMAL — SIGNIFICANT CHANGE UP
PLATELET # BLD AUTO: 194 K/UL — SIGNIFICANT CHANGE UP (ref 150–400)
PO2 BLDV: 38 MMHG — SIGNIFICANT CHANGE UP (ref 25–45)
PO2 BLDV: 56 MMHG — HIGH (ref 25–45)
POIKILOCYTOSIS BLD QL AUTO: SLIGHT — SIGNIFICANT CHANGE UP
POLYCHROMASIA BLD QL SMEAR: SLIGHT — SIGNIFICANT CHANGE UP
POTASSIUM BLDV-SCNC: 3.8 MMOL/L — SIGNIFICANT CHANGE UP (ref 3.4–4.5)
POTASSIUM BLDV-SCNC: 4.1 MMOL/L — SIGNIFICANT CHANGE UP (ref 3.4–4.5)
POTASSIUM SERPL-MCNC: 4.1 MMOL/L — SIGNIFICANT CHANGE UP (ref 3.5–5.3)
POTASSIUM SERPL-SCNC: 4.1 MMOL/L — SIGNIFICANT CHANGE UP (ref 3.5–5.3)
PROCALCITONIN SERPL-MCNC: 0.23 NG/ML — HIGH (ref 0.02–0.1)
PROT SERPL-MCNC: 7.4 G/DL — SIGNIFICANT CHANGE UP (ref 6.6–8.7)
PROT UR-MCNC: 100 MG/DL
PROTHROM AB SERPL-ACNC: 17.4 SEC — HIGH (ref 10.6–13.6)
RBC # BLD: 4.51 M/UL — SIGNIFICANT CHANGE UP (ref 4.2–5.8)
RBC # FLD: 14.8 % — HIGH (ref 10.3–14.5)
RBC BLD AUTO: ABNORMAL
RBC CASTS # UR COMP ASSIST: ABNORMAL /HPF (ref 0–4)
SAO2 % BLDV: 64 % — SIGNIFICANT CHANGE UP
SAO2 % BLDV: 86 % — SIGNIFICANT CHANGE UP
SARS-COV-2 RNA SPEC QL NAA+PROBE: DETECTED
SODIUM SERPL-SCNC: 130 MMOL/L — LOW (ref 135–145)
SP GR SPEC: 1.02 — SIGNIFICANT CHANGE UP (ref 1.01–1.02)
UROBILINOGEN FLD QL: 1 MG/DL
VARIANT LYMPHS # BLD: 1.7 % — SIGNIFICANT CHANGE UP (ref 0–6)
WBC # BLD: 5.14 K/UL — SIGNIFICANT CHANGE UP (ref 3.8–10.5)
WBC # FLD AUTO: 5.14 K/UL — SIGNIFICANT CHANGE UP (ref 3.8–10.5)
WBC UR QL: SIGNIFICANT CHANGE UP

## 2021-04-02 PROCEDURE — 99223 1ST HOSP IP/OBS HIGH 75: CPT

## 2021-04-02 PROCEDURE — 99291 CRITICAL CARE FIRST HOUR: CPT

## 2021-04-02 PROCEDURE — G1004: CPT

## 2021-04-02 PROCEDURE — 71045 X-RAY EXAM CHEST 1 VIEW: CPT | Mod: 26

## 2021-04-02 PROCEDURE — 74177 CT ABD & PELVIS W/CONTRAST: CPT | Mod: 26,MG

## 2021-04-02 PROCEDURE — 93970 EXTREMITY STUDY: CPT | Mod: 26

## 2021-04-02 RX ORDER — PANTOPRAZOLE SODIUM 20 MG/1
40 TABLET, DELAYED RELEASE ORAL
Refills: 0 | Status: DISCONTINUED | OUTPATIENT
Start: 2021-04-02 | End: 2021-04-04

## 2021-04-02 RX ORDER — METOPROLOL TARTRATE 50 MG
100 TABLET ORAL DAILY
Refills: 0 | Status: DISCONTINUED | OUTPATIENT
Start: 2021-04-02 | End: 2021-04-04

## 2021-04-02 RX ORDER — SODIUM CHLORIDE 9 MG/ML
1000 INJECTION, SOLUTION INTRAVENOUS ONCE
Refills: 0 | Status: COMPLETED | OUTPATIENT
Start: 2021-04-02 | End: 2021-04-02

## 2021-04-02 RX ORDER — CEFEPIME 1 G/1
2000 INJECTION, POWDER, FOR SOLUTION INTRAMUSCULAR; INTRAVENOUS ONCE
Refills: 0 | Status: COMPLETED | OUTPATIENT
Start: 2021-04-02 | End: 2021-04-02

## 2021-04-02 RX ORDER — IOHEXOL 300 MG/ML
30 INJECTION, SOLUTION INTRAVENOUS ONCE
Refills: 0 | Status: COMPLETED | OUTPATIENT
Start: 2021-04-02 | End: 2021-04-02

## 2021-04-02 RX ORDER — ALLOPURINOL 300 MG
100 TABLET ORAL
Refills: 0 | Status: DISCONTINUED | OUTPATIENT
Start: 2021-04-02 | End: 2021-04-04

## 2021-04-02 RX ORDER — RIVAROXABAN 15 MG-20MG
20 KIT ORAL
Refills: 0 | Status: DISCONTINUED | OUTPATIENT
Start: 2021-04-03 | End: 2021-04-04

## 2021-04-02 RX ORDER — ACETAMINOPHEN 500 MG
650 TABLET ORAL ONCE
Refills: 0 | Status: COMPLETED | OUTPATIENT
Start: 2021-04-02 | End: 2021-04-02

## 2021-04-02 RX ORDER — SODIUM CHLORIDE 9 MG/ML
2300 INJECTION, SOLUTION INTRAVENOUS ONCE
Refills: 0 | Status: COMPLETED | OUTPATIENT
Start: 2021-04-02 | End: 2021-04-02

## 2021-04-02 RX ADMIN — CEFEPIME 2000 MILLIGRAM(S): 1 INJECTION, POWDER, FOR SOLUTION INTRAMUSCULAR; INTRAVENOUS at 15:19

## 2021-04-02 RX ADMIN — CEFEPIME 100 MILLIGRAM(S): 1 INJECTION, POWDER, FOR SOLUTION INTRAMUSCULAR; INTRAVENOUS at 14:53

## 2021-04-02 RX ADMIN — SODIUM CHLORIDE 2300 MILLILITER(S): 9 INJECTION, SOLUTION INTRAVENOUS at 14:53

## 2021-04-02 RX ADMIN — Medication 650 MILLIGRAM(S): at 15:20

## 2021-04-02 RX ADMIN — SODIUM CHLORIDE 1000 MILLILITER(S): 9 INJECTION, SOLUTION INTRAVENOUS at 18:32

## 2021-04-02 RX ADMIN — IOHEXOL 30 MILLILITER(S): 300 INJECTION, SOLUTION INTRAVENOUS at 15:11

## 2021-04-02 RX ADMIN — Medication 650 MILLIGRAM(S): at 16:51

## 2021-04-02 RX ADMIN — SODIUM CHLORIDE 2300 MILLILITER(S): 9 INJECTION, SOLUTION INTRAVENOUS at 16:20

## 2021-04-02 NOTE — ED PROVIDER NOTE - OBJECTIVE STATEMENT
79yo M w/ esophageal CA w/ bony mets, gets chemo/radiation, had immuno therapy 2-3 days ago, since with decreased PO intake, since last night too weak to get up and walk. no vomiting/diarrhea. patient notes some pain in rt hip/pelvis. denies fevers. denies abdominal pain. no leg swelling. denies any falls.

## 2021-04-02 NOTE — ED PROVIDER NOTE - PMH
Atrial fibrillation    GERD (gastroesophageal reflux disease)    Gout    Hypertension    Laryngeal squamous cell carcinoma  synchronous laryngeal ca  treated with xRT  Lung cancer  SHELLI poorly differentiated  squamous cell cancer  Obesity    Other diseases of vocal cords    Reflux    Renal cancer    Type 2 diabetes mellitus  currently not on medication

## 2021-04-02 NOTE — H&P ADULT - NSHPLABSRESULTS_GEN_ALL_CORE
12.3   5.14  )-----------( 194      ( 02 Apr 2021 14:42 )             37.6     04-02    130<L>  |  93<L>  |  25.0<H>  ----------------------------<  135<H>  4.1   |  20.0<L>  |  1.32<H>    Ca    7.7<L>      02 Apr 2021 14:42    TPro  7.4  /  Alb  3.5  /  TBili  0.6  /  DBili  x   /  AST  62<H>  /  ALT  23  /  AlkPhos  202<H>  04-02        < from: CT Abdomen and Pelvis w/ Oral Cont and w/ IV Cont (04.02.21 @ 17:09) >    IMPRESSION: Multiple pulmonary groundglass opacities are new of uncertain etiology.    Liver metastases appear increased since the prior PET/CT.    < end of copied text >

## 2021-04-02 NOTE — H&P ADULT - HISTORY OF PRESENT ILLNESS
79 y/o M with h/o A fib on Xarelto, Esophageal cancer s/p chemo now on Keytruda ( last dose 3/30)  follows with Dr Zhu, Gout, GERD. Patient presents with fatigue and not feeling well for the past 48hours. Patient reports associated poor appetite and has no energy to get up. Patient was at home and unable to get up. His daughter came to see him and called EMS to bring him to the ER. Denies cough, headache, fever, chills, n/v, chest pain, SOB, abdominal pain, diarrhea, joint pain.

## 2021-04-02 NOTE — ED ADULT NURSE NOTE - NSFALLRSKASSISTTYPE_ED_ALL_ED
----- Message from MARIANA Ivan sent at 1/5/2017  5:23 PM EST -----  Please let patient know that the MRI of right knee shows some wear, but no torn cartilage.  She can come in and try a cortisone injection if still having pain.   Standing/Walking

## 2021-04-02 NOTE — ED PROVIDER NOTE - PROGRESS NOTE DETAILS
pt has not taken his xarelto since teusday d/t decreased PO intaKE. WILL check CTA. spoke with Dr. Villegas patient admitted with presumed PNA -Slowey DO added on d-dimer, patient had dye load already today with elevated Cr, will need to hold on CTA tonight -Slowey DO

## 2021-04-02 NOTE — ED ADULT NURSE NOTE - OBJECTIVE STATEMENT
Patient A&Ox2, complaining of abdominal pain, cannot provide time of onset. Patient seems forgetful, does not know why he is in ED. Spoke to daughter Tamy via telephone. 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 Had partial right lobectomy, is on chemo/immunology [id number]. Stated noticed patient was lethargic, not drinking or eating x2days. Stated was found on toiled at appx 1000 today by a friend. Patient told family & friend that he had been on the toilet all night, unable to get up on his own due to weakness. Hx of a-fib. Respirations even & unlabored. Cardiac monitor in place. Call bell within reach.

## 2021-04-02 NOTE — ED PROVIDER NOTE - CRITICAL CARE ATTENDING CONTRIBUTION TO CARE
patient critically ill requiring medications with intensive monitoring, discussion with consultants, discussion with patient and family, interpretation of diagnostic studies.

## 2021-04-02 NOTE — ED ADULT NURSE REASSESSMENT NOTE - NS ED NURSE REASSESS COMMENT FT1
Unable to draw repeat lactate 30mins s/p bolus due to patient being at CT scan. Will draw upon return to unit.

## 2021-04-02 NOTE — H&P ADULT - ASSESSMENT
Groundglass opacity on CT A/P  - Admit to medicine  - No SOB, Chest pain, cough leukocytosis, or fever to fit symptoms of PNA  - Check Procalcitonin level  - blood cultures pending  - CT chest pending  - Hold off on antibiotics pending work up given no symptoms of PNA    Elevated D dimer r/o DVT and PE   - Already on Xarelto  - US duplex LE pending  - CT Angio chest ordered by ED pending    Fatigue, no energy to get up  - Supportive care  - ? due to Keytruda  - May need PT    Esophageal ca with METs  - Oncology follow up as outpatient    A fib  - Continue Xarelto  - Continue Metoprolol     Gout   - Continue Allopurinol    GERD  - Continue Protonix        Groundglass opacity on CT A/P  - Admit to medicine/COVID unit  - COVID 19 PCR positive   - No SOB, Chest pain, cough leukocytosis, or fever to fit symptoms of PNA  - Check Procalcitonin level  - blood cultures pending  - CT chest pending  - Hold off on antibiotics pending work up given no symptoms of bacterial PNA    Elevated D dimer r/o DVT and PE   - Already on Xarelto  - US duplex LE pending  - CT Angio chest ordered by ED pending    Fatigue, no energy to get up  - Supportive care  - ? due to Keytruda  - May need PT    Esophageal ca with METs  - Oncology follow up as outpatient    A fib  - Continue Xarelto  - Continue Metoprolol     Gout   - Continue Allopurinol    GERD  - Continue Protonix        Groundglass opacity on CT A/P  - Admit to medicine/COVID unit  - COVID 19 PCR positive   - On RA so will hold off on Remdesivir  - Due for 2nd does of the COVID vaccine on 4/9/21  - No SOB, Chest pain, cough leukocytosis, or fever to fit symptoms of PNA  - Check Procalcitonin level  - blood cultures pending  - CT chest pending  - Hold off on antibiotics pending work up given no symptoms of bacterial PNA    Elevated D dimer r/o DVT and PE   - Already on Xarelto  - US duplex LE pending  - CT Angio chest ordered by ED pending    Fatigue, no energy to get up  - Supportive care  - likely due to COVID 19    Esophageal ca with METs  - Oncology follow up as outpatient    A fib  - Continue Xarelto  - Continue Metoprolol     Gout   - Continue Allopurinol    GERD  - Continue Protonix

## 2021-04-02 NOTE — H&P ADULT - NSICDXPASTSURGICALHX_GEN_ALL_CORE_FT
PAST SURGICAL HISTORY:  H/O: knee surgery right plate and 3 screws    History of lobectomy of lung left upper lobe    History of therapeutic radiation     S/P excision of vocal cord nodule     S/P Nephrectomy left, partial right

## 2021-04-02 NOTE — H&P ADULT - NSHPREVIEWOFSYSTEMS_GEN_ALL_CORE
CONSTITUTIONAL:  No Fever or chills. + Fatigue  HEENT:  No diplopia or blurred vision.  No earache, sore throat or runny nose.  CARDIOVASCULAR:  No pressure, squeezing, strangling, tightness, heaviness or aching about the chest, neck, axilla or epigastrium.  RESPIRATORY:  No cough, shortness of breath  GASTROINTESTINAL:  No nausea, vomiting or diarrhea.  GENITOURINARY:  No dysuria, frequency or urgency.  MUSCULOSKELETAL:  no joint aches, no muscle pain  SKIN:  No change in skin, hair or nails.  NEUROLOGIC:  No Headaches, seizures   PSYCHIATRIC:  No disorder of thought or mood.  ENDOCRINE:  No heat or cold intolerance  HEMATOLOGICAL:  No easy bruising or bleeding.

## 2021-04-02 NOTE — H&P ADULT - NSICDXPASTMEDICALHX_GEN_ALL_CORE_FT
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

## 2021-04-02 NOTE — ED ADULT TRIAGE NOTE - CHIEF COMPLAINT QUOTE
Patient alert to self & place, confused to time & situation. Patient comes from home, Hx of Ca with mets, poor historian. Denies any pain or discomfort. As per EMS report, patient has not eaten since chemo/immunotherapy x3 days ago. Daughter concerned for dehydration, called 911. Patient received with 20g to left AC. Received appx 600mL NS PTA.

## 2021-04-02 NOTE — ED PROVIDER NOTE - IV ALTEPLASE INCLUSION HIDDEN
show
I have personally seen and examined this patient.  I have fully participated in the care of this patient. I have reviewed all pertinent clinical information, including history, physical exam, plan and the Resident’s note and agree except as noted.

## 2021-04-02 NOTE — ED PROVIDER NOTE - CLINICAL SUMMARY MEDICAL DECISION MAKING FREE TEXT BOX
patient with fever/tachycardia, unclear source, on chemo will tx as sepsis, posisble GI/ source- urine dark and lower abd pain. hip/pelvis pain possible related to metastatic disease- will eval on ct of pelvis.

## 2021-04-02 NOTE — ED PROVIDER NOTE - PHYSICAL EXAMINATION
Gen: NAD, awakea/alert  Head: NCAT  HEENT: EOMI, oral mucosa moist, normal conjunctiva, neck supple  Lung: CTAB, no respiratory distress  CV: tachy regular, no murmur, Normal perfusion  Abd: soft, diffuse mild lower abd tp, no rebound  MSK: No edema, no visible deformities  Neuro: No focal neurologic deficits  Skin: No rash   Psych: normal affect

## 2021-04-02 NOTE — H&P ADULT - NSHPPHYSICALEXAM_GEN_ALL_CORE
Vital Signs   T(C): 36.6 (02 Apr 2021 19:20), Max: 38.3 (02 Apr 2021 14:41)  T(F): 97.8 (02 Apr 2021 19:20), Max: 100.9 (02 Apr 2021 14:41)  HR: 94 (02 Apr 2021 19:20) (94 - 114)  BP: 128/79 (02 Apr 2021 19:20) (128/79 - 162/102)  BP(mean): 98 (02 Apr 2021 19:20) (98 - 123)  RR: 21 (02 Apr 2021 19:20) (14 - 24)  SpO2: 96% (02 Apr 2021 19:20) (93% - 99%)  Height (cm): 177.8 (04-02 @ 13:54)  Weight (kg): 78 (04-02 @ 13:54)  BMI (kg/m2): 24.7 (04-02 @ 13:54)    GEN: NAD, pleasant  HEENT: normocephalic and atraumatic. EOMI. PERRL.    NECK: Supple.   LUNGS: Clear to auscultation.  HEART: Regular rate and rhythm   ABDOMEN: Soft, nontender, and nondistended.  Positive bowel sounds.    : No CVA tenderness  EXTREMITIES: Without any edema.  MSK: No joint swelling  NEUROLOGIC: no focal deficits   PSYCHIATRIC: Appropriate affect .  SKIN: No rash

## 2021-04-02 NOTE — ED ADULT NURSE NOTE - NSFALLRSKPASTHIST_ED_ALL_ED
[FreeTextEntry1] : flu negative\par strep negative\par but symptoms most suggestive of strep\par will treat empircally\par clarithromycin for 10 days\par throat culture
unable to determine

## 2021-04-02 NOTE — ED PROVIDER NOTE - PSH
H/O: knee surgery  right plate and 3 screws  History of lobectomy of lung  left upper lobe  History of therapeutic radiation    S/P excision of vocal cord nodule    S/P Nephrectomy  left, partial right

## 2021-04-03 ENCOUNTER — TRANSCRIPTION ENCOUNTER (OUTPATIENT)
Age: 81
End: 2021-04-03

## 2021-04-03 LAB
ALBUMIN SERPL ELPH-MCNC: 3 G/DL — LOW (ref 3.3–5.2)
ALP SERPL-CCNC: 158 U/L — HIGH (ref 40–120)
ALT FLD-CCNC: 22 U/L — SIGNIFICANT CHANGE UP
ANION GAP SERPL CALC-SCNC: 11 MMOL/L — SIGNIFICANT CHANGE UP (ref 5–17)
AST SERPL-CCNC: 63 U/L — HIGH
BILIRUB SERPL-MCNC: 0.5 MG/DL — SIGNIFICANT CHANGE UP (ref 0.4–2)
BUN SERPL-MCNC: 19 MG/DL — SIGNIFICANT CHANGE UP (ref 8–20)
CALCIUM SERPL-MCNC: 7.5 MG/DL — LOW (ref 8.6–10.2)
CHLORIDE SERPL-SCNC: 98 MMOL/L — SIGNIFICANT CHANGE UP (ref 98–107)
CO2 SERPL-SCNC: 24 MMOL/L — SIGNIFICANT CHANGE UP (ref 22–29)
COVID-19 SPIKE DOMAIN AB INTERP: NEGATIVE — SIGNIFICANT CHANGE UP
COVID-19 SPIKE DOMAIN ANTIBODY RESULT: 0.4 U/ML — SIGNIFICANT CHANGE UP
CREAT SERPL-MCNC: 1.28 MG/DL — SIGNIFICANT CHANGE UP (ref 0.5–1.3)
GLUCOSE BLDC GLUCOMTR-MCNC: 116 MG/DL — HIGH (ref 70–99)
GLUCOSE BLDC GLUCOMTR-MCNC: 117 MG/DL — HIGH (ref 70–99)
GLUCOSE SERPL-MCNC: 108 MG/DL — HIGH (ref 70–99)
HCT VFR BLD CALC: 31.1 % — LOW (ref 39–50)
HGB BLD-MCNC: 10.2 G/DL — LOW (ref 13–17)
MCHC RBC-ENTMCNC: 27.6 PG — SIGNIFICANT CHANGE UP (ref 27–34)
MCHC RBC-ENTMCNC: 32.8 GM/DL — SIGNIFICANT CHANGE UP (ref 32–36)
MCV RBC AUTO: 84.1 FL — SIGNIFICANT CHANGE UP (ref 80–100)
PLATELET # BLD AUTO: 148 K/UL — LOW (ref 150–400)
POTASSIUM SERPL-MCNC: 4.2 MMOL/L — SIGNIFICANT CHANGE UP (ref 3.5–5.3)
POTASSIUM SERPL-SCNC: 4.2 MMOL/L — SIGNIFICANT CHANGE UP (ref 3.5–5.3)
PROT SERPL-MCNC: 6.2 G/DL — LOW (ref 6.6–8.7)
RBC # BLD: 3.7 M/UL — LOW (ref 4.2–5.8)
RBC # FLD: 14.6 % — HIGH (ref 10.3–14.5)
SARS-COV-2 IGG+IGM SERPL QL IA: 0.4 U/ML — SIGNIFICANT CHANGE UP
SARS-COV-2 IGG+IGM SERPL QL IA: NEGATIVE — SIGNIFICANT CHANGE UP
SODIUM SERPL-SCNC: 132 MMOL/L — LOW (ref 135–145)
WBC # BLD: 4.33 K/UL — SIGNIFICANT CHANGE UP (ref 3.8–10.5)
WBC # FLD AUTO: 4.33 K/UL — SIGNIFICANT CHANGE UP (ref 3.8–10.5)

## 2021-04-03 PROCEDURE — 71275 CT ANGIOGRAPHY CHEST: CPT | Mod: 26

## 2021-04-03 PROCEDURE — 99233 SBSQ HOSP IP/OBS HIGH 50: CPT

## 2021-04-03 RX ORDER — ACETAMINOPHEN 500 MG
650 TABLET ORAL EVERY 8 HOURS
Refills: 0 | Status: DISCONTINUED | OUTPATIENT
Start: 2021-04-03 | End: 2021-04-04

## 2021-04-03 RX ORDER — DEXTROSE 50 % IN WATER 50 %
12.5 SYRINGE (ML) INTRAVENOUS ONCE
Refills: 0 | Status: DISCONTINUED | OUTPATIENT
Start: 2021-04-03 | End: 2021-04-04

## 2021-04-03 RX ORDER — GLUCAGON INJECTION, SOLUTION 0.5 MG/.1ML
1 INJECTION, SOLUTION SUBCUTANEOUS ONCE
Refills: 0 | Status: DISCONTINUED | OUTPATIENT
Start: 2021-04-03 | End: 2021-04-04

## 2021-04-03 RX ORDER — DEXAMETHASONE 0.5 MG/5ML
6 ELIXIR ORAL DAILY
Refills: 0 | Status: DISCONTINUED | OUTPATIENT
Start: 2021-04-03 | End: 2021-04-04

## 2021-04-03 RX ORDER — DEXTROSE 50 % IN WATER 50 %
25 SYRINGE (ML) INTRAVENOUS ONCE
Refills: 0 | Status: DISCONTINUED | OUTPATIENT
Start: 2021-04-03 | End: 2021-04-04

## 2021-04-03 RX ORDER — SODIUM CHLORIDE 9 MG/ML
1000 INJECTION, SOLUTION INTRAVENOUS
Refills: 0 | Status: DISCONTINUED | OUTPATIENT
Start: 2021-04-03 | End: 2021-04-04

## 2021-04-03 RX ORDER — ACETAMINOPHEN 500 MG
650 TABLET ORAL ONCE
Refills: 0 | Status: COMPLETED | OUTPATIENT
Start: 2021-04-03 | End: 2021-04-03

## 2021-04-03 RX ORDER — DEXTROSE 50 % IN WATER 50 %
15 SYRINGE (ML) INTRAVENOUS ONCE
Refills: 0 | Status: DISCONTINUED | OUTPATIENT
Start: 2021-04-03 | End: 2021-04-04

## 2021-04-03 RX ORDER — INSULIN LISPRO 100/ML
VIAL (ML) SUBCUTANEOUS
Refills: 0 | Status: DISCONTINUED | OUTPATIENT
Start: 2021-04-03 | End: 2021-04-04

## 2021-04-03 RX ADMIN — Medication 650 MILLIGRAM(S): at 09:45

## 2021-04-03 RX ADMIN — Medication 650 MILLIGRAM(S): at 08:56

## 2021-04-03 RX ADMIN — Medication 100 MILLIGRAM(S): at 05:42

## 2021-04-03 RX ADMIN — RIVAROXABAN 20 MILLIGRAM(S): KIT at 18:37

## 2021-04-03 RX ADMIN — PANTOPRAZOLE SODIUM 40 MILLIGRAM(S): 20 TABLET, DELAYED RELEASE ORAL at 08:56

## 2021-04-03 NOTE — DISCHARGE NOTE PROVIDER - NSDCCPCAREPLAN_GEN_ALL_CORE_FT
PRINCIPAL DISCHARGE DIAGNOSIS  Diagnosis: Pneumonia due to COVID-19 virus  Assessment and Plan of Treatment:       SECONDARY DISCHARGE DIAGNOSES  Diagnosis: Sepsis  Assessment and Plan of Treatment:

## 2021-04-03 NOTE — DISCHARGE NOTE PROVIDER - NSDCFUSCHEDAPPT_GEN_ALL_CORE_FT
LAYLA GALVAN ; 04/06/2021 ; [facility] [address]  LAYLA GALVAN ; 04/08/2021 ; [facility]  LAYLA GALVAN ; 04/20/2021 ; [facility]  LAYLA GALVAN ; 05/11/2021 ; [facility]  LAYLA GALVAN ; 06/01/2021 ; [facility]  LAYLA GALVAN ; 06/28/2021 ; [facility]

## 2021-04-03 NOTE — DISCHARGE NOTE PROVIDER - NSDCFUADDAPPT_GEN_ALL_CORE_FT
Please follow up with your primary care physician within 1 week    It has been determined that you no longer need hospitalization and can recover while remaining in self-quarantine at home for 14 days. You should follow the prevention steps below until a healthcare provider or [facility] says you can return to your normal activities.  Please remain in quarantine until then. You should restrict activities outside your home except for getting medical care.  Do not go to work, school or public areas.  Avoid using public transportation.  Separate yourself from other people and animals in your home.  Cover your coughs and sneezes.  Clean your hands often. Avoid sharing personal household items. Clean all high touch surfaces. Monitor your symptoms, if you have a medical emergency call 911.

## 2021-04-03 NOTE — PATIENT PROFILE ADULT - NSPROMEDSBROUGHTTOHOSP_GEN_A_NUR
General Surgery  Initial Office Visit    CC: External hemorrhoid, anal pain    HPI: The patient is a pleasant 35 y.o. year-old lady who presents today for evaluation of an external hemorrhoid that has been present for a few years.  She says she has always noticed a small bulge within her external anal skin but it was never tender until about a week ago when she was straining to have a bowel movement and felt severe pain within this region.  The bulge has remained constant in size and protuberant along the external anal skin.  She came to see me concerned that this was an external hemorrhoid requiring surgical intervention.  She denies any melena or hematochezia.    Past Medical History:   Depression  Anxiety  PCO S  Obstructive sleep apnea  Fibromyalgia  History of C. difficile colitis    Past Surgical History:   Total abdominal hysterectomy with bilateral salpingo-oophorectomy   section  Laparoscopic cholecystectomy  Tonsillectomy  Tympanoplasty    Medications:   Cymbalta 60 mg daily  Estradiol 1 mg daily  Lyrica once daily  Valtrex 500 mg daily    Allergies: Amoxicillin, Ceclor, codeine, egg products, penicillins, ceftriaxone, sulfa antibiotics    Family History: No family history of gastrointestinal malignancy in her mother or father    Social History: , unemployed, non-smoker, no regular alcohol use    ROS:   Constitutional: Negative for fevers or chills  HENT: Negative for hearing loss or runny nose  Eyes: Negative for vision changes or scleral icterus  Respiratory: Negative for cough or shortness of breath  Cardiovascular: Negative for chest pain or heart palpitations  Gastrointestinal: Positive for rectal and anal pain; negative for abdominal pain, nausea, vomiting, constipation, melena, or hematochezia  Genitourinary: Negative for hematuria or dysuria  Musculoskeletal: Negative for joint pain or back pain  Neurologic: Negative for headaches or dizziness  Psychiatric: Negative for anxiety or  depression  All other systems reviewed and negative    Physical Exam:  Vitals:    03/12/19 1040   Pulse: 82   SpO2: 98%     General: No acute distress, well-nourished & well-developed  HEAD: normocephalic, atraumatic  EYES: normal conjunctiva, sclera anicteric  EARS: grossly normal hearing  NECK: supple, no thyromegaly  CARDIOVASCULAR: regular rate and rhythm  RESPIRATORY: clear to auscultation bilaterally  GASTROINTESTINAL: soft, nontender, non-distended  MUSCULOSKELETAL: normal gait and station. No gross extremity abnormalities  PSYCHIATRIC: oriented x3, normal mood and affect  RECTUM: Small thrombosed external hemorrhoid that is tender to palpation    Procedure Note:  Pre-Operative Diagnosis: Thrombosed external hemorrhoid  Post-Operative Diagnosis: Same  Procedure performed: Incision and drainage of thrombosed external hemorrhoid  Surgeon: Diane Frausto MD  Assistant: None  Anesthesia: Local (1% Lidocaine with epinephrine)  Complications: None  EBL: Minimal  Specimens: None  Description of Procedure: The patient was brought to the minor procedure room and laid prone on the examining table.  Her perianal skin was prepped and draped sterilely using Hibiclens soap and a surgical timeout completed.  The thrombosed external hemorrhoid was anesthetized using 1% lidocaine with epinephrine and incised sharply.  There was a small amount of clot beneath the skin that was easily evacuated, completely decompressing the thrombosed hemorrhoid.  She tolerated the procedure well.      ASSESSMENT & PLAN  Mrs. Mtz is a 35-year-old lady with a thrombosed external hemorrhoid for which I performed an incision and drainage today in the office.  She tolerated this quite well and can follow-up with me as needed.  She knows to avoid any constipation in the future by using an over-the-counter fiber supplement or stool softener regularly.  If she has any recurrent issues with hemorrhoidal disease she can come back to see me but  these are typically isolated episodes.    Diane Frausto MD  General and Endoscopic Surgery  [facility]    [address], [address]  [place], [place], [address]  P: [phone]  F: [phone]      no

## 2021-04-03 NOTE — DISCHARGE NOTE PROVIDER - HOSPITAL COURSE
81 y/o M with PMH of Afib on Xarelto, esophageal cancer s/p chemo now on Keytruda, gout, GERD was admitted for COVID pneumonia. CTA chest and lower extremity dopplers negative for PE/DVT. 81 y/o M with PMH of Afib on Xarelto, esophageal cancer s/p chemo now on Keytruda, gout, GERD was admitted for COVID pneumonia. CTA chest and lower extremity dopplers negative for PE/DVT. The patient did not require any oxygen throughout hospital course. The patient is hemodynamically stable for discharge.     Vital Signs Last 24 Hrs  T(F): 97.4 (04 Apr 2021 08:41), Max: 98.2 (03 Apr 2021 14:30)  HR: 106 (04 Apr 2021 08:41) (98 - 106)  BP: 106/71 (04 Apr 2021 08:41) (106/71 - 162/107)  RR: 18 (04 Apr 2021 08:41) (18 - 20)  SpO2: 96% (04 Apr 2021 08:41) (94% - 96%)    Physical Exam:  Constitutional: alert and oriented, in no acute distress   Neck: Soft and supple  Respiratory: Clear to auscultation bilaterally, no wheezes or crackles  Cardiovascular: Irregular rhythm   Gastrointestinal: Soft, non-tender to palpation, +bs  Vascular: 2+ peripheral pulses  Neurological: A/O x 3, no focal neurological deficits  Musculoskeletal: 5/5 strength b/l upper and lower extremities, no lower extremity edema bilaterally    Time spent on discharge: 32 minutes

## 2021-04-03 NOTE — DISCHARGE NOTE PROVIDER - NSDCMRMEDTOKEN_GEN_ALL_CORE_FT
allopurinol 100 mg oral tablet: 1 tab(s) orally every other day  ALPRAZOLAM 0.25 MG TABLET:   lansoprazole 30 mg capsule,delayed release:   Metoprolol Succinate  mg oral tablet, extended release: 1 tab(s) orally once a day  oxyCODONE 5 mg oral tablet: 1-2 tab(s) orally every 6 hours, As Needed -for moderate pain MDD:6 tabs   tramadol 50 mg tablet: 1  orally every 6 hours, As Needed  Xarelto 15 mg oral tablet: 1 tab(s) orally once a day, LD 10/17

## 2021-04-03 NOTE — PROGRESS NOTE ADULT - SUBJECTIVE AND OBJECTIVE BOX
Chief complaint: COVID    Patient seen and examined at bedside. No acute overnight events reported. Patient states he is feeling weak but does not have other symptoms at this time. Denies fever, chills, cough, chest pain, nausea or vomiting. Not currently requiring supplemental O2.     Vital Signs Last 24 Hrs  T(F): 97.7 (03 Apr 2021 08:38), Max: 100.9 (02 Apr 2021 14:41)  HR: 96 (03 Apr 2021 08:38) (94 - 114)  BP: 102/64 (03 Apr 2021 08:38) (102/64 - 162/102)  RR: 18 (03 Apr 2021 08:38) (14 - 24)  SpO2: 97% (03 Apr 2021 08:38) (93% - 99%)    Physical Exam:  Constitutional: alert and oriented, in no acute distress   Neck: Soft and supple, No LAD, No JVD  Respiratory: Coarse breath sounds at bases  Cardiovascular: Irregular rhythm  Gastrointestinal: Soft, non-tender to palpation, +bs  Vascular: 2+ peripheral pulses  Neurological: A/O x 3, no focal neurological deficits  Musculoskeletal:  no lower extremity edema bilaterally    Labs:                        10.2   4.33  )-----------( 148      ( 03 Apr 2021 06:50 )             31.1   04-03    132<L>  |  98  |  19.0  ----------------------------<  108<H>  4.2   |  24.0  |  1.28    Ca    7.5<L>      03 Apr 2021 06:49    TPro  6.2<L>  /  Alb  3.0<L>  /  TBili  0.5  /  DBili  x   /  AST  63<H>  /  ALT  22  /  AlkPhos  158<H>  04-03

## 2021-04-03 NOTE — PROGRESS NOTE ADULT - ASSESSMENT
81 y/o M with h/o A fib on Xarelto, Esophageal cancer s/p chemo now on Keytruda ( last dose 3/30)  follows with Dr Zhu, Gout, GERD admitted for COVID.     COVID Pneumonia  - COVID 19 PCR positive   - On RA so will hold off on Remdesivir  - Due for 2nd does of the COVID vaccine on 4/9/21  - No SOB, Chest pain, cough leukocytosis, or fever to fit symptoms of PNA  - Procalcitonin 0.23  - Blood cultures in process  - CT chest pending  - Hold off on antibiotics pending work up given no symptoms of bacterial PNA    Elevated D dimer r/o DVT and PE   - D-dime r632  - Already on Xarelto  - US duplex LE negative  - CT Angio chest ordered by ED pending    Fatigue, no energy to get up  - Supportive care  - likely due to COVID 19    Esophageal ca with METs  - Oncology follow up as outpatient    Chronic Afib  - Continue Xarelto  - Continue Metoprolol 100mg daily    Gout   - Continue Allopurinol    GERD  - Continue Protonix     DVT ppx  - Xarelto    Dispo: 2-3 days pending clinical course 81 y/o M with h/o A fib on Xarelto, Esophageal cancer s/p chemo now on Keytruda ( last dose 3/30)  follows with Dr Zhu, Gout, GERD admitted for COVID.     COVID Pneumonia  - COVID 19 PCR positive   - On RA so will hold off on Remdesivir  - Due for 2nd does of the COVID vaccine on 4/9/21  - No SOB, Chest pain, cough leukocytosis, or fever to fit symptoms of PNA  - Procalcitonin 0.23  - Blood cultures in process  - CT chest pending  - Hold off on antibiotics pending work up given no symptoms of bacterial PNA    Elevated D dimer r/o DVT and PE   - D-dime r632  - Already on Xarelto  - US duplex LE negative  - CT Angio chest negative for PE    Fatigue, no energy to get up  - Supportive care  - likely due to COVID 19    Esophageal ca with METs  - Oncology follow up as outpatient    Chronic Afib  - Continue Xarelto  - Continue Metoprolol 100mg daily    Gout   - Continue Allopurinol    GERD  - Continue Protonix     DVT ppx  - Xarelto    Dispo: 2-3 days pending clinical course    Plan of care discussed with patient's daughter Tamy.

## 2021-04-03 NOTE — PROVIDER CONTACT NOTE (OTHER) - RECOMMENDATIONS
pt currently has an order to check O2 sats upon ambulating and at rest, RN unable to check O2 sats upon ambulation due to safety concerns, pt would benefit from PT consult and tyelenol pain medication

## 2021-04-03 NOTE — PROVIDER CONTACT NOTE (OTHER) - SITUATION
pt very weak, unable to ambulate more than a pivot to and from commode/bed  pt c/o pain in bottom area

## 2021-04-04 ENCOUNTER — TRANSCRIPTION ENCOUNTER (OUTPATIENT)
Age: 81
End: 2021-04-04

## 2021-04-04 VITALS
RESPIRATION RATE: 18 BRPM | OXYGEN SATURATION: 95 % | DIASTOLIC BLOOD PRESSURE: 84 MMHG | SYSTOLIC BLOOD PRESSURE: 143 MMHG | HEART RATE: 109 BPM

## 2021-04-04 LAB
A1C WITH ESTIMATED AVERAGE GLUCOSE RESULT: 6.4 % — HIGH (ref 4–5.6)
ALBUMIN SERPL ELPH-MCNC: 3.1 G/DL — LOW (ref 3.3–5.2)
ALP SERPL-CCNC: 168 U/L — HIGH (ref 40–120)
ALT FLD-CCNC: 24 U/L — SIGNIFICANT CHANGE UP
ANION GAP SERPL CALC-SCNC: 14 MMOL/L — SIGNIFICANT CHANGE UP (ref 5–17)
ANISOCYTOSIS BLD QL: SLIGHT — SIGNIFICANT CHANGE UP
AST SERPL-CCNC: 62 U/L — HIGH
BASOPHILS # BLD AUTO: 0 K/UL — SIGNIFICANT CHANGE UP (ref 0–0.2)
BASOPHILS NFR BLD AUTO: 0 % — SIGNIFICANT CHANGE UP (ref 0–2)
BILIRUB SERPL-MCNC: 0.6 MG/DL — SIGNIFICANT CHANGE UP (ref 0.4–2)
BUN SERPL-MCNC: 20 MG/DL — SIGNIFICANT CHANGE UP (ref 8–20)
BURR CELLS BLD QL SMEAR: PRESENT — SIGNIFICANT CHANGE UP
CALCIUM SERPL-MCNC: 7.3 MG/DL — LOW (ref 8.6–10.2)
CHLORIDE SERPL-SCNC: 94 MMOL/L — LOW (ref 98–107)
CO2 SERPL-SCNC: 24 MMOL/L — SIGNIFICANT CHANGE UP (ref 22–29)
CREAT SERPL-MCNC: 1.3 MG/DL — SIGNIFICANT CHANGE UP (ref 0.5–1.3)
CULTURE RESULTS: SIGNIFICANT CHANGE UP
EOSINOPHIL # BLD AUTO: 0 K/UL — SIGNIFICANT CHANGE UP (ref 0–0.5)
EOSINOPHIL NFR BLD AUTO: 0 % — SIGNIFICANT CHANGE UP (ref 0–6)
ESTIMATED AVERAGE GLUCOSE: 137 MG/DL — HIGH (ref 68–114)
GIANT PLATELETS BLD QL SMEAR: PRESENT — SIGNIFICANT CHANGE UP
GLUCOSE BLDC GLUCOMTR-MCNC: 138 MG/DL — HIGH (ref 70–99)
GLUCOSE BLDC GLUCOMTR-MCNC: 187 MG/DL — HIGH (ref 70–99)
GLUCOSE SERPL-MCNC: 116 MG/DL — HIGH (ref 70–99)
HCT VFR BLD CALC: 34 % — LOW (ref 39–50)
HGB BLD-MCNC: 11.2 G/DL — LOW (ref 13–17)
LYMPHOCYTES # BLD AUTO: 0.21 K/UL — LOW (ref 1–3.3)
LYMPHOCYTES # BLD AUTO: 4.5 % — LOW (ref 13–44)
MANUAL SMEAR VERIFICATION: SIGNIFICANT CHANGE UP
MCHC RBC-ENTMCNC: 27.5 PG — SIGNIFICANT CHANGE UP (ref 27–34)
MCHC RBC-ENTMCNC: 32.9 GM/DL — SIGNIFICANT CHANGE UP (ref 32–36)
MCV RBC AUTO: 83.5 FL — SIGNIFICANT CHANGE UP (ref 80–100)
MICROCYTES BLD QL: SLIGHT — SIGNIFICANT CHANGE UP
MONOCYTES # BLD AUTO: 0.37 K/UL — SIGNIFICANT CHANGE UP (ref 0–0.9)
MONOCYTES NFR BLD AUTO: 8 % — SIGNIFICANT CHANGE UP (ref 2–14)
NEUTROPHILS # BLD AUTO: 4 K/UL — SIGNIFICANT CHANGE UP (ref 1.8–7.4)
NEUTROPHILS NFR BLD AUTO: 87.5 % — HIGH (ref 43–77)
OVALOCYTES BLD QL SMEAR: SLIGHT — SIGNIFICANT CHANGE UP
PLAT MORPH BLD: NORMAL — SIGNIFICANT CHANGE UP
PLATELET # BLD AUTO: 179 K/UL — SIGNIFICANT CHANGE UP (ref 150–400)
POIKILOCYTOSIS BLD QL AUTO: SLIGHT — SIGNIFICANT CHANGE UP
POLYCHROMASIA BLD QL SMEAR: SLIGHT — SIGNIFICANT CHANGE UP
POTASSIUM SERPL-MCNC: 3.9 MMOL/L — SIGNIFICANT CHANGE UP (ref 3.5–5.3)
POTASSIUM SERPL-SCNC: 3.9 MMOL/L — SIGNIFICANT CHANGE UP (ref 3.5–5.3)
PROT SERPL-MCNC: 6.4 G/DL — LOW (ref 6.6–8.7)
RBC # BLD: 4.07 M/UL — LOW (ref 4.2–5.8)
RBC # FLD: 14.9 % — HIGH (ref 10.3–14.5)
RBC BLD AUTO: ABNORMAL
SODIUM SERPL-SCNC: 132 MMOL/L — LOW (ref 135–145)
SPECIMEN SOURCE: SIGNIFICANT CHANGE UP
WBC # BLD: 4.57 K/UL — SIGNIFICANT CHANGE UP (ref 3.8–10.5)
WBC # FLD AUTO: 4.57 K/UL — SIGNIFICANT CHANGE UP (ref 3.8–10.5)

## 2021-04-04 PROCEDURE — 84145 PROCALCITONIN (PCT): CPT

## 2021-04-04 PROCEDURE — 84295 ASSAY OF SERUM SODIUM: CPT

## 2021-04-04 PROCEDURE — 83605 ASSAY OF LACTIC ACID: CPT

## 2021-04-04 PROCEDURE — 82330 ASSAY OF CALCIUM: CPT

## 2021-04-04 PROCEDURE — 82962 GLUCOSE BLOOD TEST: CPT

## 2021-04-04 PROCEDURE — 71045 X-RAY EXAM CHEST 1 VIEW: CPT

## 2021-04-04 PROCEDURE — 85027 COMPLETE CBC AUTOMATED: CPT

## 2021-04-04 PROCEDURE — 99285 EMERGENCY DEPT VISIT HI MDM: CPT | Mod: 25

## 2021-04-04 PROCEDURE — 80053 COMPREHEN METABOLIC PANEL: CPT

## 2021-04-04 PROCEDURE — 36415 COLL VENOUS BLD VENIPUNCTURE: CPT

## 2021-04-04 PROCEDURE — 85014 HEMATOCRIT: CPT

## 2021-04-04 PROCEDURE — 85018 HEMOGLOBIN: CPT

## 2021-04-04 PROCEDURE — 82435 ASSAY OF BLOOD CHLORIDE: CPT

## 2021-04-04 PROCEDURE — 87086 URINE CULTURE/COLONY COUNT: CPT

## 2021-04-04 PROCEDURE — U0005: CPT

## 2021-04-04 PROCEDURE — 85730 THROMBOPLASTIN TIME PARTIAL: CPT

## 2021-04-04 PROCEDURE — 99239 HOSP IP/OBS DSCHRG MGMT >30: CPT

## 2021-04-04 PROCEDURE — 84132 ASSAY OF SERUM POTASSIUM: CPT

## 2021-04-04 PROCEDURE — 86769 SARS-COV-2 COVID-19 ANTIBODY: CPT

## 2021-04-04 PROCEDURE — 93970 EXTREMITY STUDY: CPT

## 2021-04-04 PROCEDURE — 82947 ASSAY GLUCOSE BLOOD QUANT: CPT

## 2021-04-04 PROCEDURE — 82803 BLOOD GASES ANY COMBINATION: CPT

## 2021-04-04 PROCEDURE — 85610 PROTHROMBIN TIME: CPT

## 2021-04-04 PROCEDURE — 83036 HEMOGLOBIN GLYCOSYLATED A1C: CPT

## 2021-04-04 PROCEDURE — U0003: CPT

## 2021-04-04 PROCEDURE — 87040 BLOOD CULTURE FOR BACTERIA: CPT

## 2021-04-04 PROCEDURE — 71275 CT ANGIOGRAPHY CHEST: CPT

## 2021-04-04 PROCEDURE — 96365 THER/PROPH/DIAG IV INF INIT: CPT

## 2021-04-04 PROCEDURE — 74177 CT ABD & PELVIS W/CONTRAST: CPT

## 2021-04-04 PROCEDURE — 85025 COMPLETE CBC W/AUTO DIFF WBC: CPT

## 2021-04-04 PROCEDURE — 85379 FIBRIN DEGRADATION QUANT: CPT

## 2021-04-04 PROCEDURE — 81001 URINALYSIS AUTO W/SCOPE: CPT

## 2021-04-04 PROCEDURE — 93005 ELECTROCARDIOGRAM TRACING: CPT

## 2021-04-04 RX ADMIN — Medication 1: at 12:49

## 2021-04-04 RX ADMIN — Medication 100 MILLIGRAM(S): at 05:49

## 2021-04-04 RX ADMIN — Medication 100 MILLIGRAM(S): at 09:13

## 2021-04-04 RX ADMIN — PANTOPRAZOLE SODIUM 40 MILLIGRAM(S): 20 TABLET, DELAYED RELEASE ORAL at 05:50

## 2021-04-04 RX ADMIN — Medication 6 MILLIGRAM(S): at 05:49

## 2021-04-04 NOTE — DISCHARGE NOTE NURSING/CASE MANAGEMENT/SOCIAL WORK - PATIENT PORTAL LINK FT
You can access the FollowMyHealth Patient Portal offered by [facility] by registering at the following website: [email or web address]. By joining [facility]’s FollowMyHealth portal, you will also be able to view your health information using other applications (apps) compatible with our system.

## 2021-04-04 NOTE — DISCHARGE NOTE NURSING/CASE MANAGEMENT/SOCIAL WORK - NSDCFUADDAPPT_GEN_ALL_CORE_FT
Please follow up with your primary care physician within 1 week    It has been determined that you no longer need hospitalization and can recover while remaining in self-quarantine at home for 14 days. You should follow the prevention steps below until a healthcare provider or [facility] says you can return to your normal activities.  Please remain in quarantine until then. You should restrict activities outside your home except for getting medical care.  Do not go to work, school or public areas.  Avoid using public transportation.  Separate yourself from other people and animals in your home.  Cover your coughs and sneezes.  Clean your hands often. Avoid sharing personal household items. Clean all high touch surfaces. Monitor your symptoms, if you have a medical emergency call 911.

## 2021-04-05 ENCOUNTER — INPATIENT (INPATIENT)
Facility: HOSPITAL | Age: 81
LOS: 2 days | Discharge: HOME | DRG: 177 | End: 2021-04-08
Attending: INTERNAL MEDICINE | Admitting: INTERNAL MEDICINE
Payer: MEDICARE

## 2021-04-05 VITALS
DIASTOLIC BLOOD PRESSURE: 97 MMHG | SYSTOLIC BLOOD PRESSURE: 149 MMHG | HEIGHT: 70 IN | RESPIRATION RATE: 20 BRPM | OXYGEN SATURATION: 93 % | TEMPERATURE: 98 F | HEART RATE: 125 BPM

## 2021-04-05 DIAGNOSIS — Z98.890 OTHER SPECIFIED POSTPROCEDURAL STATES: Chronic | ICD-10-CM

## 2021-04-05 DIAGNOSIS — Z90.2 ACQUIRED ABSENCE OF LUNG [PART OF]: Chronic | ICD-10-CM

## 2021-04-05 DIAGNOSIS — Z92.3 PERSONAL HISTORY OF IRRADIATION: Chronic | ICD-10-CM

## 2021-04-05 LAB
ALBUMIN SERPL ELPH-MCNC: 3.4 G/DL — SIGNIFICANT CHANGE UP (ref 3.3–5.2)
ALP SERPL-CCNC: 176 U/L — HIGH (ref 40–120)
ALT FLD-CCNC: 27 U/L — SIGNIFICANT CHANGE UP
ANION GAP SERPL CALC-SCNC: 18 MMOL/L — HIGH (ref 5–17)
ANISOCYTOSIS BLD QL: SLIGHT — SIGNIFICANT CHANGE UP
APTT BLD: 31.5 SEC — SIGNIFICANT CHANGE UP (ref 27.5–35.5)
AST SERPL-CCNC: 63 U/L — HIGH
BASOPHILS # BLD AUTO: 0.07 K/UL — SIGNIFICANT CHANGE UP (ref 0–0.2)
BASOPHILS NFR BLD AUTO: 0.8 % — SIGNIFICANT CHANGE UP (ref 0–2)
BILIRUB SERPL-MCNC: 0.8 MG/DL — SIGNIFICANT CHANGE UP (ref 0.4–2)
BUN SERPL-MCNC: 24 MG/DL — HIGH (ref 8–20)
CALCIUM SERPL-MCNC: 7.2 MG/DL — LOW (ref 8.6–10.2)
CHLORIDE SERPL-SCNC: 89 MMOL/L — LOW (ref 98–107)
CO2 SERPL-SCNC: 21 MMOL/L — LOW (ref 22–29)
CREAT SERPL-MCNC: 1.38 MG/DL — HIGH (ref 0.5–1.3)
CRP SERPL-MCNC: 65 MG/L — HIGH
D DIMER BLD IA.RAPID-MCNC: 424 NG/ML DDU — HIGH
EOSINOPHIL # BLD AUTO: 0 K/UL — SIGNIFICANT CHANGE UP (ref 0–0.5)
EOSINOPHIL NFR BLD AUTO: 0 % — SIGNIFICANT CHANGE UP (ref 0–6)
FERRITIN SERPL-MCNC: 4831 NG/ML — HIGH (ref 30–400)
FIBRINOGEN PPP-MCNC: 689 MG/DL — HIGH (ref 290–520)
GIANT PLATELETS BLD QL SMEAR: PRESENT — SIGNIFICANT CHANGE UP
GLUCOSE SERPL-MCNC: 145 MG/DL — HIGH (ref 70–99)
HCT VFR BLD CALC: 37.1 % — LOW (ref 39–50)
HGB BLD-MCNC: 11.9 G/DL — LOW (ref 13–17)
INR BLD: 1.91 RATIO — HIGH (ref 0.88–1.16)
LYMPHOCYTES # BLD AUTO: 0.08 K/UL — LOW (ref 1–3.3)
LYMPHOCYTES # BLD AUTO: 0.9 % — LOW (ref 13–44)
MANUAL SMEAR VERIFICATION: SIGNIFICANT CHANGE UP
MCHC RBC-ENTMCNC: 26.6 PG — LOW (ref 27–34)
MCHC RBC-ENTMCNC: 32.1 GM/DL — SIGNIFICANT CHANGE UP (ref 32–36)
MCV RBC AUTO: 82.8 FL — SIGNIFICANT CHANGE UP (ref 80–100)
MICROCYTES BLD QL: SLIGHT — SIGNIFICANT CHANGE UP
MONOCYTES # BLD AUTO: 0.08 K/UL — SIGNIFICANT CHANGE UP (ref 0–0.9)
MONOCYTES NFR BLD AUTO: 0.9 % — LOW (ref 2–14)
NEUTROPHILS # BLD AUTO: 8.19 K/UL — HIGH (ref 1.8–7.4)
NEUTROPHILS NFR BLD AUTO: 96.5 % — HIGH (ref 43–77)
OVALOCYTES BLD QL SMEAR: SLIGHT — SIGNIFICANT CHANGE UP
PLAT MORPH BLD: NORMAL — SIGNIFICANT CHANGE UP
PLATELET # BLD AUTO: 222 K/UL — SIGNIFICANT CHANGE UP (ref 150–400)
POIKILOCYTOSIS BLD QL AUTO: SLIGHT — SIGNIFICANT CHANGE UP
POLYCHROMASIA BLD QL SMEAR: SLIGHT — SIGNIFICANT CHANGE UP
POTASSIUM SERPL-MCNC: 4 MMOL/L — SIGNIFICANT CHANGE UP (ref 3.5–5.3)
POTASSIUM SERPL-SCNC: 4 MMOL/L — SIGNIFICANT CHANGE UP (ref 3.5–5.3)
PROCALCITONIN SERPL-MCNC: 0.4 NG/ML — HIGH (ref 0.02–0.1)
PROT SERPL-MCNC: 7 G/DL — SIGNIFICANT CHANGE UP (ref 6.6–8.7)
PROTHROM AB SERPL-ACNC: 21.5 SEC — HIGH (ref 10.6–13.6)
RBC # BLD: 4.48 M/UL — SIGNIFICANT CHANGE UP (ref 4.2–5.8)
RBC # FLD: 14.8 % — HIGH (ref 10.3–14.5)
RBC BLD AUTO: ABNORMAL
SODIUM SERPL-SCNC: 128 MMOL/L — LOW (ref 135–145)
TROPONIN T SERPL-MCNC: <0.01 NG/ML — SIGNIFICANT CHANGE UP (ref 0–0.06)
VARIANT LYMPHS # BLD: 0.9 % — SIGNIFICANT CHANGE UP (ref 0–6)
WBC # BLD: 8.49 K/UL — SIGNIFICANT CHANGE UP (ref 3.8–10.5)
WBC # FLD AUTO: 8.49 K/UL — SIGNIFICANT CHANGE UP (ref 3.8–10.5)

## 2021-04-05 PROCEDURE — 71045 X-RAY EXAM CHEST 1 VIEW: CPT | Mod: 26

## 2021-04-05 PROCEDURE — 93010 ELECTROCARDIOGRAM REPORT: CPT

## 2021-04-05 PROCEDURE — 99285 EMERGENCY DEPT VISIT HI MDM: CPT

## 2021-04-05 RX ORDER — SODIUM CHLORIDE 9 MG/ML
1000 INJECTION INTRAMUSCULAR; INTRAVENOUS; SUBCUTANEOUS ONCE
Refills: 0 | Status: COMPLETED | OUTPATIENT
Start: 2021-04-05 | End: 2021-04-05

## 2021-04-05 RX ORDER — ACETAMINOPHEN 500 MG
975 TABLET ORAL ONCE
Refills: 0 | Status: COMPLETED | OUTPATIENT
Start: 2021-04-05 | End: 2021-04-05

## 2021-04-05 RX ORDER — ONDANSETRON 8 MG/1
4 TABLET, FILM COATED ORAL ONCE
Refills: 0 | Status: COMPLETED | OUTPATIENT
Start: 2021-04-05 | End: 2021-04-05

## 2021-04-05 RX ADMIN — ONDANSETRON 4 MILLIGRAM(S): 8 TABLET, FILM COATED ORAL at 22:30

## 2021-04-05 RX ADMIN — SODIUM CHLORIDE 1000 MILLILITER(S): 9 INJECTION INTRAMUSCULAR; INTRAVENOUS; SUBCUTANEOUS at 22:30

## 2021-04-05 RX ADMIN — Medication 975 MILLIGRAM(S): at 22:30

## 2021-04-05 NOTE — ED PROVIDER NOTE - CLINICAL SUMMARY MEDICAL DECISION MAKING FREE TEXT BOX
Pt is COVID+ presenting with worsening weakness. Found to be hypoxic requiring oxygen. Tachycardic and in Afib. Labs and oxygen. Will likely require admission.

## 2021-04-05 NOTE — ED PROVIDER NOTE - OBJECTIVE STATEMENT
81 y/o M pt with significant PMHx of Afib, GERD, presents to the ED c/o generalized weakness and difficulty breathing. Pt is COVID+ and was discharged home yesterday. He vomited yesterday. Has no appetite. 81 y/o M pt with significant PMHx of Afib, GERD, gout, HTN, laryngeal squamous cell carcinoma, lung CA, obesity, vocal cord disease, reflux, renal CA, and DM presents to the ED c/o generalized weakness and difficulty breathing. Pt is COVID+ and was discharged home yesterday. He vomited yesterday. Has no appetite.

## 2021-04-05 NOTE — ED PROVIDER NOTE - PHYSICAL EXAMINATION
Gen: Well appearing in NAD  Head: NC/AT  Neck: trachea midline  Resp:  No distress. Diffuse rhonchi bilaterally.   CV: Tachycardic, irregular rhythm  GI: abd soft, NT, and ND.   Ext: no deformities  Neuro:  A&O appears non focal  Skin:  Warm and dry as visualized  Psych:  Normal affect and mood

## 2021-04-05 NOTE — ED PROVIDER NOTE - CARE PLAN
Principal Discharge DX:	Acute hypoxemic respiratory failure due to COVID-19  Secondary Diagnosis:	Atrial fibrillation with RVR

## 2021-04-06 ENCOUNTER — APPOINTMENT (OUTPATIENT)
Dept: ENDOCRINOLOGY | Facility: CLINIC | Age: 81
End: 2021-04-06

## 2021-04-06 DIAGNOSIS — U07.1 COVID-19: ICD-10-CM

## 2021-04-06 LAB
ANION GAP SERPL CALC-SCNC: 13 MMOL/L — SIGNIFICANT CHANGE UP (ref 5–17)
BUN SERPL-MCNC: 26 MG/DL — HIGH (ref 8–20)
CALCIUM SERPL-MCNC: 7.1 MG/DL — LOW (ref 8.6–10.2)
CHLORIDE SERPL-SCNC: 96 MMOL/L — LOW (ref 98–107)
CO2 SERPL-SCNC: 22 MMOL/L — SIGNIFICANT CHANGE UP (ref 22–29)
CREAT SERPL-MCNC: 1.21 MG/DL — SIGNIFICANT CHANGE UP (ref 0.5–1.3)
GLUCOSE BLDC GLUCOMTR-MCNC: 128 MG/DL — HIGH (ref 70–99)
GLUCOSE BLDC GLUCOMTR-MCNC: 161 MG/DL — HIGH (ref 70–99)
GLUCOSE BLDC GLUCOMTR-MCNC: 169 MG/DL — HIGH (ref 70–99)
GLUCOSE SERPL-MCNC: 168 MG/DL — HIGH (ref 70–99)
POTASSIUM SERPL-MCNC: 4.4 MMOL/L — SIGNIFICANT CHANGE UP (ref 3.5–5.3)
POTASSIUM SERPL-SCNC: 4.4 MMOL/L — SIGNIFICANT CHANGE UP (ref 3.5–5.3)
SARS-COV-2 RNA SPEC QL NAA+PROBE: DETECTED
SODIUM SERPL-SCNC: 131 MMOL/L — LOW (ref 135–145)

## 2021-04-06 PROCEDURE — 99497 ADVNCD CARE PLAN 30 MIN: CPT

## 2021-04-06 PROCEDURE — 99223 1ST HOSP IP/OBS HIGH 75: CPT

## 2021-04-06 RX ORDER — ONDANSETRON 8 MG/1
4 TABLET, FILM COATED ORAL EVERY 6 HOURS
Refills: 0 | Status: DISCONTINUED | OUTPATIENT
Start: 2021-04-06 | End: 2021-04-08

## 2021-04-06 RX ORDER — PANTOPRAZOLE SODIUM 20 MG/1
40 TABLET, DELAYED RELEASE ORAL
Refills: 0 | Status: DISCONTINUED | OUTPATIENT
Start: 2021-04-06 | End: 2021-04-08

## 2021-04-06 RX ORDER — DEXAMETHASONE 0.5 MG/5ML
6 ELIXIR ORAL ONCE
Refills: 0 | Status: COMPLETED | OUTPATIENT
Start: 2021-04-06 | End: 2021-04-06

## 2021-04-06 RX ORDER — SODIUM CHLORIDE 9 MG/ML
1000 INJECTION, SOLUTION INTRAVENOUS
Refills: 0 | Status: DISCONTINUED | OUTPATIENT
Start: 2021-04-06 | End: 2021-04-08

## 2021-04-06 RX ORDER — ACETAMINOPHEN 500 MG
650 TABLET ORAL EVERY 6 HOURS
Refills: 0 | Status: DISCONTINUED | OUTPATIENT
Start: 2021-04-06 | End: 2021-04-08

## 2021-04-06 RX ORDER — DEXTROSE 50 % IN WATER 50 %
15 SYRINGE (ML) INTRAVENOUS ONCE
Refills: 0 | Status: DISCONTINUED | OUTPATIENT
Start: 2021-04-06 | End: 2021-04-08

## 2021-04-06 RX ORDER — DEXTROSE 50 % IN WATER 50 %
25 SYRINGE (ML) INTRAVENOUS ONCE
Refills: 0 | Status: DISCONTINUED | OUTPATIENT
Start: 2021-04-06 | End: 2021-04-08

## 2021-04-06 RX ORDER — DEXTROSE 50 % IN WATER 50 %
12.5 SYRINGE (ML) INTRAVENOUS ONCE
Refills: 0 | Status: DISCONTINUED | OUTPATIENT
Start: 2021-04-06 | End: 2021-04-08

## 2021-04-06 RX ORDER — RIVAROXABAN 15 MG-20MG
15 KIT ORAL
Refills: 0 | Status: DISCONTINUED | OUTPATIENT
Start: 2021-04-06 | End: 2021-04-08

## 2021-04-06 RX ORDER — IBUPROFEN 200 MG
600 TABLET ORAL ONCE
Refills: 0 | Status: COMPLETED | OUTPATIENT
Start: 2021-04-06 | End: 2021-04-06

## 2021-04-06 RX ORDER — INSULIN LISPRO 100/ML
VIAL (ML) SUBCUTANEOUS
Refills: 0 | Status: DISCONTINUED | OUTPATIENT
Start: 2021-04-06 | End: 2021-04-08

## 2021-04-06 RX ORDER — METOPROLOL TARTRATE 50 MG
50 TABLET ORAL ONCE
Refills: 0 | Status: COMPLETED | OUTPATIENT
Start: 2021-04-06 | End: 2021-04-06

## 2021-04-06 RX ORDER — GLUCAGON INJECTION, SOLUTION 0.5 MG/.1ML
1 INJECTION, SOLUTION SUBCUTANEOUS ONCE
Refills: 0 | Status: DISCONTINUED | OUTPATIENT
Start: 2021-04-06 | End: 2021-04-08

## 2021-04-06 RX ORDER — METOPROLOL TARTRATE 50 MG
5 TABLET ORAL ONCE
Refills: 0 | Status: COMPLETED | OUTPATIENT
Start: 2021-04-06 | End: 2021-04-06

## 2021-04-06 RX ORDER — METOPROLOL TARTRATE 50 MG
50 TABLET ORAL
Refills: 0 | Status: DISCONTINUED | OUTPATIENT
Start: 2021-04-06 | End: 2021-04-08

## 2021-04-06 RX ADMIN — Medication 50 MILLIGRAM(S): at 18:08

## 2021-04-06 RX ADMIN — SODIUM CHLORIDE 1000 MILLILITER(S): 9 INJECTION INTRAMUSCULAR; INTRAVENOUS; SUBCUTANEOUS at 00:43

## 2021-04-06 RX ADMIN — RIVAROXABAN 15 MILLIGRAM(S): KIT at 18:08

## 2021-04-06 RX ADMIN — Medication 600 MILLIGRAM(S): at 00:49

## 2021-04-06 RX ADMIN — Medication 50 MILLIGRAM(S): at 02:26

## 2021-04-06 RX ADMIN — PANTOPRAZOLE SODIUM 40 MILLIGRAM(S): 20 TABLET, DELAYED RELEASE ORAL at 08:44

## 2021-04-06 RX ADMIN — Medication 50 MILLIGRAM(S): at 11:43

## 2021-04-06 RX ADMIN — Medication 5 MILLIGRAM(S): at 21:51

## 2021-04-06 RX ADMIN — Medication 2: at 08:42

## 2021-04-06 RX ADMIN — Medication 2: at 11:43

## 2021-04-06 RX ADMIN — Medication 6 MILLIGRAM(S): at 00:49

## 2021-04-06 RX ADMIN — Medication 5 MILLIGRAM(S): at 02:27

## 2021-04-06 NOTE — H&P ADULT - ASSESSMENT
80M with a history of atrial fibrillation, GERD, gout, hypertension, diabetes, and laryngeal cancer who was recently hospitalized for COVID-19 now presenting with generalized weakness and dyspnea.    COVID-19 - No hypoxia noted on examination. To continue close monitoring. Antipyretic medications for fever as needed. Chest Xray was without significant change in comparison to prior studies.    Atrial fibrillation - Heart rate improved. On metoprolol. Rivaroxaban for anticoagulation.    Hyponatremia / Azotemia - Suspect secondary to poor oral intake and vomiting. Intravenous fluids were administered in the emergency department. Repeat laboratory studies ordered to monitor.    Hypertension - Close blood pressure monitoring.    Diabetes - Insulin coverage, close monitoring of blood glucose levels.   80M with a history of atrial fibrillation, GERD, gout, hypertension, diabetes, and laryngeal cancer who was recently hospitalized for COVID-19 now presenting with generalized weakness and dyspnea.    COVID-19 - No hypoxia noted on examination. To continue close monitoring. Antipyretic medications for fever as needed. Chest Xray was without significant change in comparison to prior studies. D-dimer elevated but improved in comparison to the prior study. CT angiogram from three days prior was without pulmonary embolism and lower extremity doppler at the time was without deep venous thrombosis.    Atrial fibrillation - Heart rate improved. On metoprolol. Rivaroxaban for anticoagulation.    Hyponatremia / Azotemia - Suspect secondary to poor oral intake and vomiting. Intravenous fluids were administered in the emergency department. Repeat laboratory studies ordered to monitor.    Hypertension - Close blood pressure monitoring.    Diabetes - Insulin coverage, close monitoring of blood glucose levels.

## 2021-04-06 NOTE — CHART NOTE - NSCHARTNOTEFT_GEN_A_CORE
Pt admitted this am with weakness and SOB secondary to Covid 19 infection in setting of malignancy on Keytruda  Was dsc recently, however given weakness pts family insisted he return   He reports that kin feels better this am, understands that acute vial infection results in profound weakness in young healthy pts and that in setting of already profound weakness from Malignancy on chemo slow recovery is not unexpected.  Currently not dyspneic, O2S 95% on 2 L O2  Will use IVF with caution given Covid 19 infection, encourage po intake  PT eval in am

## 2021-04-06 NOTE — H&P ADULT - HISTORY OF PRESENT ILLNESS
80M with a recent hospitalization for COVID-19 presented with  80M with a recent hospitalization for COVID-19 presented with generalized weakness and dyspnea. The patient reported that he had similar symptoms when he returned home after being discharged from the hospital. The patient reported that the dyspnea was constant and was without any relieving factors. He reported a nonproductive cough but denied any fever. He denied any chest pain or palpitations. Upon presentation and initial evaluation in the emergency department, the patient was noted to have fever(104.2) and atrial fibrillation with rapid ventricular rate. The patient was reported to have hypoxia although it was not reflected in the vital signs. The patient also reported that he has had poor oral intake due to decreased appetite and also had an episode of vomiting at home.

## 2021-04-06 NOTE — H&P ADULT - NSHPPHYSICALEXAM_GEN_ALL_CORE
Vital Signs Last 24 Hrs  T(C): 36.9 (05 Apr 2021 23:24), Max: 40.1 (05 Apr 2021 22:15)  T(F): 98.5 (05 Apr 2021 23:24), Max: 104.2 (05 Apr 2021 22:15)  HR: 84 (06 Apr 2021 03:10) (84 - 150)  BP: 106/66 (06 Apr 2021 03:10) (101/74 - 149/97)  BP(mean): --  RR: 20 (06 Apr 2021 02:25) (20 - 26)  SpO2: 96% (06 Apr 2021 02:25) (90% - 97%)    General appearance: No acute distress, Awake, Alert  HEENT: Normocephalic, Atraumatic, Conjunctiva clear, EOMI  Neck: Supple, No JVD, No tenderness  Lungs: Breath sound equal bilaterally, No wheezes, No rales  Cardiovascular: S1S2, Irregular rhythm  Abdomen: Soft, Nontender, Nondistended, No guarding/rebound, Positive bowel sounds  Extremities: No clubbing, No cyanosis, No edema, No calf tenderness  Neuro: Strength equal bilaterally, No tremors  Psychiatric: Appropriate mood, Normal affect

## 2021-04-06 NOTE — GOALS OF CARE CONVERSATION - ADVANCED CARE PLANNING - WHAT MATTERS MOST
Currently pt wishes for improvement in quality of life with prolongation using chemo Quality of life

## 2021-04-06 NOTE — GOALS OF CARE CONVERSATION - ADVANCED CARE PLANNING - CONVERSATION DETAILS
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 Pt understands that despite XRT / SHELLI lobectomy metastatic lesions have progressed. In setting of Covid infection he understands that his overall weakness has repeatedly worsened.   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 Pt understands that despite XRT / SHELLI lobectomy metastatic lesions have progressed. In setting of Covid infection he understands that his overall weakness has repeatedly worsened.   He wishes to be full Code at this time, designates daughter to be HCP    Care also discussed with daughter (Tamy who is HCP). She confirms that pt has not made an advanced directive,. At times he states he is dying and she has considered hospice but is awaiting repeat imaging results and onclogy f/u to know if current chemo regimen if helping. If not, she will discuss hospice with pt as the pts wife (HCP mother) was in hospice and they felt the end of life care management was excellent.

## 2021-04-07 LAB
ALBUMIN SERPL ELPH-MCNC: 2.9 G/DL — LOW (ref 3.3–5.2)
ALP SERPL-CCNC: 148 U/L — HIGH (ref 40–120)
ALT FLD-CCNC: 23 U/L — SIGNIFICANT CHANGE UP
ANION GAP SERPL CALC-SCNC: 15 MMOL/L — SIGNIFICANT CHANGE UP (ref 5–17)
AST SERPL-CCNC: 50 U/L — HIGH
BILIRUB SERPL-MCNC: 0.7 MG/DL — SIGNIFICANT CHANGE UP (ref 0.4–2)
BUN SERPL-MCNC: 26 MG/DL — HIGH (ref 8–20)
CALCIUM SERPL-MCNC: 7.3 MG/DL — LOW (ref 8.6–10.2)
CHLORIDE SERPL-SCNC: 95 MMOL/L — LOW (ref 98–107)
CO2 SERPL-SCNC: 20 MMOL/L — LOW (ref 22–29)
COVID-19 SPIKE DOMAIN AB INTERP: POSITIVE
COVID-19 SPIKE DOMAIN ANTIBODY RESULT: 1.5 U/ML — HIGH
CREAT SERPL-MCNC: 1.31 MG/DL — HIGH (ref 0.5–1.3)
CULTURE RESULTS: SIGNIFICANT CHANGE UP
CULTURE RESULTS: SIGNIFICANT CHANGE UP
GLUCOSE BLDC GLUCOMTR-MCNC: 114 MG/DL — HIGH (ref 70–99)
GLUCOSE BLDC GLUCOMTR-MCNC: 118 MG/DL — HIGH (ref 70–99)
GLUCOSE BLDC GLUCOMTR-MCNC: 128 MG/DL — HIGH (ref 70–99)
GLUCOSE SERPL-MCNC: 120 MG/DL — HIGH (ref 70–99)
HCT VFR BLD CALC: 33.7 % — LOW (ref 39–50)
HGB BLD-MCNC: 11.3 G/DL — LOW (ref 13–17)
MCHC RBC-ENTMCNC: 27.6 PG — SIGNIFICANT CHANGE UP (ref 27–34)
MCHC RBC-ENTMCNC: 33.5 GM/DL — SIGNIFICANT CHANGE UP (ref 32–36)
MCV RBC AUTO: 82.2 FL — SIGNIFICANT CHANGE UP (ref 80–100)
PLATELET # BLD AUTO: 210 K/UL — SIGNIFICANT CHANGE UP (ref 150–400)
POTASSIUM SERPL-MCNC: 4.2 MMOL/L — SIGNIFICANT CHANGE UP (ref 3.5–5.3)
POTASSIUM SERPL-SCNC: 4.2 MMOL/L — SIGNIFICANT CHANGE UP (ref 3.5–5.3)
PROT SERPL-MCNC: 6.4 G/DL — LOW (ref 6.6–8.7)
RBC # BLD: 4.1 M/UL — LOW (ref 4.2–5.8)
RBC # FLD: 15.1 % — HIGH (ref 10.3–14.5)
SARS-COV-2 IGG+IGM SERPL QL IA: 1.5 U/ML — HIGH
SARS-COV-2 IGG+IGM SERPL QL IA: POSITIVE
SODIUM SERPL-SCNC: 130 MMOL/L — LOW (ref 135–145)
SPECIMEN SOURCE: SIGNIFICANT CHANGE UP
SPECIMEN SOURCE: SIGNIFICANT CHANGE UP
WBC # BLD: 9 K/UL — SIGNIFICANT CHANGE UP (ref 3.8–10.5)
WBC # FLD AUTO: 9 K/UL — SIGNIFICANT CHANGE UP (ref 3.8–10.5)

## 2021-04-07 PROCEDURE — 74176 CT ABD & PELVIS W/O CONTRAST: CPT | Mod: 26

## 2021-04-07 PROCEDURE — 99232 SBSQ HOSP IP/OBS MODERATE 35: CPT

## 2021-04-07 RX ORDER — METOPROLOL TARTRATE 50 MG
12.5 TABLET ORAL ONCE
Refills: 0 | Status: COMPLETED | OUTPATIENT
Start: 2021-04-07 | End: 2021-04-07

## 2021-04-07 RX ORDER — METOPROLOL TARTRATE 50 MG
5 TABLET ORAL EVERY 8 HOURS
Refills: 0 | Status: DISCONTINUED | OUTPATIENT
Start: 2021-04-07 | End: 2021-04-08

## 2021-04-07 RX ADMIN — Medication 5 MILLIGRAM(S): at 16:03

## 2021-04-07 RX ADMIN — Medication 50 MILLIGRAM(S): at 17:50

## 2021-04-07 RX ADMIN — Medication 50 MILLIGRAM(S): at 05:21

## 2021-04-07 RX ADMIN — RIVAROXABAN 15 MILLIGRAM(S): KIT at 17:51

## 2021-04-07 RX ADMIN — PANTOPRAZOLE SODIUM 40 MILLIGRAM(S): 20 TABLET, DELAYED RELEASE ORAL at 05:21

## 2021-04-07 RX ADMIN — Medication 12.5 MILLIGRAM(S): at 00:57

## 2021-04-07 NOTE — CONSULT NOTE ADULT - ASSESSMENT
80M with a history mentioned above  who was recently hospitalized for COVID-19 now presenting with generalized weakness and dyspnea.   80M with a history mentioned above  who was recently hospitalized for COVID-19 now presenting with generalized weakness and dyspnea. 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 Liver metastases appear increased since the prior PET/CT. Patchy bilateral ground glass opacities and consolidations are noted in a peripheral distribution in keeping with known COVID19 infection. Small left pleural effusion. Interval increase in size of prevascular lymph nodes when compared with August 3, 2020 examination with measurements as above. Left eighth rib lytic lesion is unchanged.    - Recent scans appear to reveal progression of disease  - Given patient performance status at this point in time, it does not appear patient can tolerate chemotherapy treatment  - Prognosis guarded    Assessment and recommendations are final when note is signed by the attending          80M with a history mentioned above  who was recently hospitalized for COVID-19 now presenting with generalized weakness and dyspnea. 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 Liver metastases appear increased since the prior PET/CT. Patchy bilateral ground glass opacities and consolidations are noted in a peripheral distribution in keeping with known COVID19 infection. Small left pleural effusion. Interval increase in size of prevascular lymph nodes when compared with August 3, 2020 examination with measurements as above. Left eighth rib lytic lesion is unchanged.    - Recent scans appear to reveal progression of disease  - Given patient performance status at this point in time, it does not appear patient can tolerate chemotherapy treatment.  -Discussion with patients daughter regarding guarded prognosis and she is electing home hospice.  They will evaluate the patient today.

## 2021-04-07 NOTE — PROGRESS NOTE ADULT - ASSESSMENT
79 y/o M with h/o A fib on Xarelto, Esophageal cancer s/p chemo now on Keytruda ( last dose 3/30)  follows with Dr Zhu, Gout, GERD admitted for COVID.     COVID Pneumonia  - COVID 19 PCR positive   - On RA so will hold off on Remdesivir  - Due for 2nd does of the COVID vaccine on 4/9/21  - No SOB, Chest pain, cough leukocytosis, or fever to fit symptoms of PNA  - Procalcitonin 0.23  - Blood cultures in process  CT imaging noted     Elevated D dimer r/o DVT and PE   - D-dime [id number]  - Already on Xarelto  - US duplex LE negative  - CT Angio chest negative for PE    Fatigue, no energy to get up  - Supportive care  - likely due to COVID 19    Esophageal ca with METs  Considering hospice pending oncology eval     Chronic Afib  - Continue Xarelto  - Continue Metoprolol 100mg daily  - Metoprolol 5 mg Q 6 PRN      Gout   - Continue Allopurinol    GERD  - Continue Protonix     DVT ppx  - Xarelto    Dispo: possible hospice

## 2021-04-07 NOTE — CONSULT NOTE ADULT - SUBJECTIVE AND OBJECTIVE BOX
REASON FOR CONSULTATION Lung Ca    HPI:  80M with a recent hospitalization for COVID-19 presented with generalized weakness and dyspnea. The patient reported that he had similar symptoms when he returned home after being discharged from the hospital. The patient reported that the dyspnea was constant and was without any relieving factors. He reported a nonproductive cough but denied any fever. He denied any chest pain or palpitations. Upon presentation and initial evaluation in the emergency department, the patient was noted to have fever(104.2) and atrial fibrillation with rapid ventricular rate. The patient was reported to have hypoxia although it was not reflected in the vital signs. The patient also reported that he has had poor oral intake due to decreased appetite and also had an episode of vomiting at home. 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 No adjuvant chemotherapy as he was poor Cisplatin candidate due to partial solitary kidney. PDL1 0% () on original path. PET CT 8/18/20 shows new FDG avid hepatic lesions suspicious for metastatic disease and destructive lytic lesion left eighth rib and additional lesions left ninth rib and T1 spinous process, suspicious for metastatic disease. Liver core bx 9/03/20 confirms metastatic squamous cell carcinoma. Completed palliative radiation to the left lateral ribcage and C7-T3 on 10/01/20. He received Carbo AUC 5, Taxol 160 mg/m2, and pembrolizumab every 3 weeks x 4 cycles completed on 11/24/20. Xgeva every 6 weeks for bone metastasis. PET CT 12/04/20 shows improvement in prevascular nodules, stable liver lesion, and improved bone mets. No new disease. Started maintenance Keytruda every 3 weeks on 12/14/20.      REVIEW OF SYSTEMS:    CONSTITUTIONAL: No fever, weight loss, or fatigue  EYES: No eye pain, visual disturbances, or discharge  ENMT:  No difficulty hearing, tinnitus, vertigo; No sinus or throat pain  NECK: No pain or stiffness  RESPIRATORY: No cough, wheezing, chills or hemoptysis; No shortness of breath  CARDIOVASCULAR: No chest pain, palpitations, dizziness, or leg swelling  GASTROINTESTINAL: No abdominal or epigastric pain. No nausea, vomiting, or hematemesis; No diarrhea or constipation. No melena or hematochezia.  GENITOURINARY: No dysuria, frequency, hematuria, or incontinence  NEUROLOGICAL: No headaches, memory loss, loss of strength, numbness, or tremors  SKIN: No itching, burning, rashes, or lesions   LYMPH NODES: No enlarged glands  ENDOCRINE: No heat or cold intolerance; No hair loss  MUSCULOSKELETAL: No joint pain or swelling; No muscle, back, or extremity pain  PSYCHIATRIC: No depression, anxiety, mood swings, or difficulty sleeping  HEME/LYMPH: No easy bruising, or bleeding gums  ALLERY AND IMMUNOLOGIC: No hives or eczema    PAST MEDICAL & SURGICAL HISTORY:  Hypertension    Renal cancer    Reflux    Gout    GERD (gastroesophageal reflux disease)    Obesity    Atrial fibrillation    Other diseases of vocal cords    Type 2 diabetes mellitus  currently not on medication    Laryngeal squamous cell carcinoma  synchronous laryngeal ca  treated with xRT    Lung cancer  SHELLI poorly differentiated  squamous cell cancer    S/P Nephrectomy  left, partial right    H/O: knee surgery  right plate and 3 screws    History of lobectomy of lung  left upper lobe    S/P excision of vocal cord nodule    History of therapeutic radiation        SOCIAL HISTORY:    FAMILY HISTORY:  FH: type 2 diabetes mellitus    Family hx of prostate cancer  Father        SOCIAL HISTORY:    Allergies    codeine (Other)    Intolerances    simvastatin (Muscle Pain; Joint Pain)      MEDICATIONS  (STANDING):  dextrose 40% Gel 15 Gram(s) Oral once  dextrose 5%. 1000 milliLiter(s) (100 mL/Hr) IV Continuous <Continuous>  dextrose 5%. 1000 milliLiter(s) (50 mL/Hr) IV Continuous <Continuous>  dextrose 50% Injectable 25 Gram(s) IV Push once  dextrose 50% Injectable 12.5 Gram(s) IV Push once  dextrose 50% Injectable 25 Gram(s) IV Push once  glucagon  Injectable 1 milliGRAM(s) IntraMuscular once  insulin lispro (ADMELOG) corrective regimen sliding scale   SubCutaneous three times a day before meals  metoprolol tartrate 50 milliGRAM(s) Oral two times a day  pantoprazole    Tablet 40 milliGRAM(s) Oral before breakfast  rivaroxaban 15 milliGRAM(s) Oral with dinner    MEDICATIONS  (PRN):  acetaminophen   Tablet .. 650 milliGRAM(s) Oral every 6 hours PRN Temp greater or equal to 38C (100.4F), Mild Pain (1 - 3)  ondansetron Injectable 4 milliGRAM(s) IV Push every 6 hours PRN Nausea and/or Vomiting      Vital Signs Last 24 Hrs  T(C): 36.9 (07 Apr 2021 08:08), Max: 36.9 (07 Apr 2021 08:08)  T(F): 98.4 (07 Apr 2021 08:08), Max: 98.4 (07 Apr 2021 08:08)  HR: 107 (07 Apr 2021 08:08) (74 - 131)  BP: 146/85 (07 Apr 2021 08:08) (110/67 - 146/85)  BP(mean): --  RR: 16 (07 Apr 2021 08:08) (16 - 18)  SpO2: 96% (07 Apr 2021 08:08) (95% - 97%)    PHYSICAL EXAM:    GENERAL: NAD, well-groomed, well-developed  HEAD:  Atraumatic, Normocephalic  ENMT: Moist mucous membranes  NECK: Supple, No JVD, Normal thyroid  NERVOUS SYSTEM:  Alert & Oriented X3, Good concentration;  CHEST/LUNG: Clear to percussion bilaterally; No rales, rhonchi, wheezing, or rubs  HEART: Regular rate and rhythm; No murmurs, rubs, or gallops  ABDOMEN: Soft, Nontender, Nondistended; Bowel sounds present  EXTREMITIES:  2+ Peripheral Pulses, No clubbing, cyanosis, or edema  LYMPH: No lymphadenopathy noted  SKIN: No rashes or lesions      LABS:                        11.3   9.00  )-----------( 210      ( 07 Apr 2021 07:03 )             33.7     04-07    130<L>  |  95<L>  |  26.0<H>  ----------------------------<  120<H>  4.2   |  20.0<L>  |  1.31<H>    Ca    7.3<L>      07 Apr 2021 07:03    TPro  6.4<L>  /  Alb  2.9<L>  /  TBili  0.7  /  DBili  x   /  AST  50<H>  /  ALT  23  /  AlkPhos  148<H>  04-07    PT/INR - ( 05 Apr 2021 22:49 )   PT: 21.5 sec;   INR: 1.91 ratio         PTT - ( 05 Apr 2021 22:49 )  PTT:31.5 sec        RADIOLOGY & ADDITIONAL STUDIES:    EXAM:  CT ANGIO CHEST (W)AW IC                        PROCEDURE DATE:  04/03/2021      INTERPRETATION:  Reason for Exam: Shortness of breath. chest pain.    CTA of the chest was performed from the thoracic inlet to the level of the adrenal glands following IV contrast injection of  80 cc of Omnipaque 350. No immediate complications were reported.  MIP images were also created and reviewed.    Comparison: August 3, 2020    Tubes/Lines: None    Mediastinum and Heart: Aorta and pulmonary arteries are normal in size. Thyroid gland is unremarkable. Prevascular lymph nodes have increased in size compared to previous examination. On series 5 image 40 7A node measures 4.4 x 2.6 cm, previously measuring 2.6 x 1.2 cm.. No pericardial effusion.    Lungs, Pleura, and Airways: There is no pulmonary embolus. Patchy bilateral ground glass opacities and consolidations are noted in a peripheral distribution in keeping with known COVID19 infection. Patient is status post left upper lobectomy. Small left pleural effusion.    Visualized Abdomen: Unremarkable.    Bones and soft tissues: Left eighth rib lytic lesion is unchanged.    IMPRESSION:    No pulmonary embolus.    Patchy bilateral ground glass opacities and consolidations are noted in a peripheral distribution in keeping with known COVID19 infection.    Small left pleural effusion.    Interval increase in size of prevascular lymph nodes when compared with August 3, 2020 examination with measurements as above. Left eighth rib lytic lesion is unchanged.    EXAM:  CT ABDOMEN AND PELVIS OC IC                        PROCEDURE DATE:  04/02/2021      INTERPRETATION:  CLINICAL INFORMATION: Abdominal distension    COMPARISON: 12.4.20.    CONTRAST/COMPLICATIONS:  IV Contrast: Omnipaque 300  96 cc administered   4 cc discarded  Oral Contrast: NONE  Complications: None reported at time of study completion    PROCEDURE:  CT of the Abdomen and Pelvis was performed.  Sagittal and coronal reformats were performed.    FINDINGS:    LOWER CHEST: Scattered ground glass opacities.  Trace left pleural effusion.    LIVER: Ill-defined infiltrative lesions in the right greater than left lobes are again noted. A reference measurement is 7.8 x 4.1 cm on series 3 image 49. Exact comparison with the prior PET/CT is difficult but disease appears to be increased.  BILE DUCTS: Normal caliber.  GALLBLADDER: Cholelithiasis.  SPLEEN: Within normal limits.  PANCREAS: Within normal limits.  ADRENALS: Within normal limits.  KIDNEYS/URETERS: Status post left nephrectomy.  Right renal scarring. Subcentimeter right renal calculi. A subcentimeter right renal lesion is too small to characterize.    BLADDER: Within normal limits.  REPRODUCTIVE ORGANS: Within normal limits.    BOWEL: No bowel obstruction.  PERITONEUM: No ascites.  VESSELS:  Within normal limits.  RETROPERITONEUM/LYMPH NODES: No lymphadenopathy.  ABDOMINAL WALL: Within normal limits.  BONES: Similar appearance of lytic metastasis involving the left eighth rib. Chronic fracture left ninth rib.    IMPRESSION: Multiple pulmonary ground glass opacities are new of uncertain etiology.    Liver metastases appear increased since the prior PET/CT.       REASON FOR CONSULTATION Lung Ca    HPI:  80M with a recent hospitalization for COVID-19 presented with generalized weakness and dyspnea. The patient reported that he had similar symptoms when he returned home after being discharged from the hospital. The patient reported that the dyspnea was constant and was without any relieving factors. He reported a nonproductive cough but denied any fever. He denied any chest pain or palpitations. Upon presentation and initial evaluation in the emergency department, the patient was noted to have fever(104.2) and atrial fibrillation with rapid ventricular rate. The patient was reported to have hypoxia although it was not reflected in the vital signs. The patient also reported that he has had poor oral intake due to decreased appetite and also had an episode of vomiting at home. 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 No adjuvant chemotherapy as he was poor Cisplatin candidate due to partial solitary kidney. PDL1 0% () on original path. PET CT 8/18/20 shows new FDG avid hepatic lesions suspicious for metastatic disease and destructive lytic lesion left eighth rib and additional lesions left ninth rib and T1 spinous process, suspicious for metastatic disease. Liver core bx 9/03/20 confirms metastatic squamous cell carcinoma. Completed palliative radiation to the left lateral ribcage and C7-T3 on 10/01/20. He received Carbo AUC 5, Taxol 160 mg/m2, and pembrolizumab every 3 weeks x 4 cycles completed on 11/24/20. Xgeva every 6 weeks for bone metastasis. PET CT 12/04/20 shows improvement in prevascular nodules, stable liver lesion, and improved bone mets. No new disease. Started maintenance Keytruda every 3 weeks on 12/14/20, last tx on 3/30/31.    Patient examined at bedside. Patient appears somnolent. Patient  stated he wanted to go home repeatedly.     Unable to obtain ROS due to current status    PAST MEDICAL & SURGICAL HISTORY:  Hypertension    Renal cancer    Reflux    Gout    GERD (gastroesophageal reflux disease)    Obesity    Atrial fibrillation    Other diseases of vocal cords    Type 2 diabetes mellitus  currently not on medication    Laryngeal squamous cell carcinoma  synchronous laryngeal ca  treated with xRT    Lung cancer  SHELLI poorly differentiated  squamous cell cancer    S/P Nephrectomy  left, partial right    H/O: knee surgery  right plate and 3 screws    History of lobectomy of lung  left upper lobe    S/P excision of vocal cord nodule    History of therapeutic radiation        SOCIAL HISTORY:    FAMILY HISTORY:  FH: type 2 diabetes mellitus    Family hx of prostate cancer  Father        SOCIAL HISTORY:    Allergies    codeine (Other)    Intolerances    simvastatin (Muscle Pain; Joint Pain)      MEDICATIONS  (STANDING):  dextrose 40% Gel 15 Gram(s) Oral once  dextrose 5%. 1000 milliLiter(s) (100 mL/Hr) IV Continuous <Continuous>  dextrose 5%. 1000 milliLiter(s) (50 mL/Hr) IV Continuous <Continuous>  dextrose 50% Injectable 25 Gram(s) IV Push once  dextrose 50% Injectable 12.5 Gram(s) IV Push once  dextrose 50% Injectable 25 Gram(s) IV Push once  glucagon  Injectable 1 milliGRAM(s) IntraMuscular once  insulin lispro (ADMELOG) corrective regimen sliding scale   SubCutaneous three times a day before meals  metoprolol tartrate 50 milliGRAM(s) Oral two times a day  pantoprazole    Tablet 40 milliGRAM(s) Oral before breakfast  rivaroxaban 15 milliGRAM(s) Oral with dinner    MEDICATIONS  (PRN):  acetaminophen   Tablet .. 650 milliGRAM(s) Oral every 6 hours PRN Temp greater or equal to 38C (100.4F), Mild Pain (1 - 3)  ondansetron Injectable 4 milliGRAM(s) IV Push every 6 hours PRN Nausea and/or Vomiting      Vital Signs Last 24 Hrs  T(C): 36.9 (07 Apr 2021 08:08), Max: 36.9 (07 Apr 2021 08:08)  T(F): 98.4 (07 Apr 2021 08:08), Max: 98.4 (07 Apr 2021 08:08)  HR: 107 (07 Apr 2021 08:08) (74 - 131)  BP: 146/85 (07 Apr 2021 08:08) (110/67 - 146/85)  BP(mean): --  RR: 16 (07 Apr 2021 08:08) (16 - 18)  SpO2: 96% (07 Apr 2021 08:08) (95% - 97%)    PHYSICAL EXAM:    GENERAL: NAD,  HEAD:  Atraumatic, Normocephalic  ENMT: Moist mucous membranes  NECK: Supple, No JVD,  NERVOUS SYSTEM: somnolent  CHEST/LUNG: Nonlabored breathing noted  HEART: Regular rate and rhythm; No murmurs, rubs, or gallops  ABDOMEN: Soft, Nontender, Nondistended; Bowel sounds present  EXTREMITIES:  2+ Peripheral Pulses, No clubbing, cyanosis, or edema  SKIN: No rashes or lesions      LABS:                        11.3   9.00  )-----------( 210      ( 07 Apr 2021 07:03 )             33.7     04-07    130<L>  |  95<L>  |  26.0<H>  ----------------------------<  120<H>  4.2   |  20.0<L>  |  1.31<H>    Ca    7.3<L>      07 Apr 2021 07:03    TPro  6.4<L>  /  Alb  2.9<L>  /  TBili  0.7  /  DBili  x   /  AST  50<H>  /  ALT  23  /  AlkPhos  148<H>  04-07    PT/INR - ( 05 Apr 2021 22:49 )   PT: 21.5 sec;   INR: 1.91 ratio         PTT - ( 05 Apr 2021 22:49 )  PTT:31.5 sec        RADIOLOGY & ADDITIONAL STUDIES:    EXAM:  CT ANGIO CHEST (W)AW IC                        PROCEDURE DATE:  04/03/2021      INTERPRETATION:  Reason for Exam: Shortness of breath. chest pain.    CTA of the chest was performed from the thoracic inlet to the level of the adrenal glands following IV contrast injection of  80 cc of Omnipaque 350. No immediate complications were reported.  MIP images were also created and reviewed.    Comparison: August 3, 2020    Tubes/Lines: None    Mediastinum and Heart: Aorta and pulmonary arteries are normal in size. Thyroid gland is unremarkable. Prevascular lymph nodes have increased in size compared to previous examination. On series 5 image 40 7A node measures 4.4 x 2.6 cm, previously measuring 2.6 x 1.2 cm.. No pericardial effusion.    Lungs, Pleura, and Airways: There is no pulmonary embolus. Patchy bilateral ground glass opacities and consolidations are noted in a peripheral distribution in keeping with known COVID19 infection. Patient is status post left upper lobectomy. Small left pleural effusion.    Visualized Abdomen: Unremarkable.    Bones and soft tissues: Left eighth rib lytic lesion is unchanged.    IMPRESSION:    No pulmonary embolus.    Patchy bilateral ground glass opacities and consolidations are noted in a peripheral distribution in keeping with known COVID19 infection.    Small left pleural effusion.    Interval increase in size of prevascular lymph nodes when compared with August 3, 2020 examination with measurements as above. Left eighth rib lytic lesion is unchanged.    EXAM:  CT ABDOMEN AND PELVIS OC IC                        PROCEDURE DATE:  04/02/2021      INTERPRETATION:  CLINICAL INFORMATION: Abdominal distension    COMPARISON: 12.4.20.    CONTRAST/COMPLICATIONS:  IV Contrast: Omnipaque 300  96 cc administered   4 cc discarded  Oral Contrast: NONE  Complications: None reported at time of study completion    PROCEDURE:  CT of the Abdomen and Pelvis was performed.  Sagittal and coronal reformats were performed.    FINDINGS:    LOWER CHEST: Scattered ground glass opacities.  Trace left pleural effusion.    LIVER: Ill-defined infiltrative lesions in the right greater than left lobes are again noted. A reference measurement is 7.8 x 4.1 cm on series 3 image 49. Exact comparison with the prior PET/CT is difficult but disease appears to be increased.  BILE DUCTS: Normal caliber.  GALLBLADDER: Cholelithiasis.  SPLEEN: Within normal limits.  PANCREAS: Within normal limits.  ADRENALS: Within normal limits.  KIDNEYS/URETERS: Status post left nephrectomy.  Right renal scarring. Subcentimeter right renal calculi. A subcentimeter right renal lesion is too small to characterize.    BLADDER: Within normal limits.  REPRODUCTIVE ORGANS: Within normal limits.    BOWEL: No bowel obstruction.  PERITONEUM: No ascites.  VESSELS:  Within normal limits.  RETROPERITONEUM/LYMPH NODES: No lymphadenopathy.  ABDOMINAL WALL: Within normal limits.  BONES: Similar appearance of lytic metastasis involving the left eighth rib. Chronic fracture left ninth rib.    IMPRESSION: Multiple pulmonary ground glass opacities are new of uncertain etiology.    Liver metastases appear increased since the prior PET/CT.

## 2021-04-07 NOTE — PROGRESS NOTE ADULT - SUBJECTIVE AND OBJECTIVE BOX
[facility] Division of Hospital Medicine    Chief Complaint:  Weakness    SUBJECTIVE / OVERNIGHT EVENTS:  Pt remains weak  Today states that he knows he is dying  Awaiting heme/onc recs to determine if pt is likely to benefit from ongoing chemo or not (family considering hospice   Patient denies chest pain, SOB, abd pain, N/V, fever, chills, dysuria or any other complaints. All remainder ROS negative.     MEDICATIONS  (STANDING):  dextrose 40% Gel 15 Gram(s) Oral once  dextrose 5%. 1000 milliLiter(s) (50 mL/Hr) IV Continuous <Continuous>  dextrose 5%. 1000 milliLiter(s) (100 mL/Hr) IV Continuous <Continuous>  dextrose 50% Injectable 25 Gram(s) IV Push once  dextrose 50% Injectable 12.5 Gram(s) IV Push once  dextrose 50% Injectable 25 Gram(s) IV Push once  glucagon  Injectable 1 milliGRAM(s) IntraMuscular once  insulin lispro (ADMELOG) corrective regimen sliding scale   SubCutaneous three times a day before meals  metoprolol tartrate 50 milliGRAM(s) Oral two times a day  pantoprazole    Tablet 40 milliGRAM(s) Oral before breakfast  rivaroxaban 15 milliGRAM(s) Oral with dinner    MEDICATIONS  (PRN):  acetaminophen   Tablet .. 650 milliGRAM(s) Oral every 6 hours PRN Temp greater or equal to 38C (100.4F), Mild Pain (1 - 3)  metoprolol tartrate Injectable 5 milliGRAM(s) IV Push every 8 hours PRN HR > 120  ondansetron Injectable 4 milliGRAM(s) IV Push every 6 hours PRN Nausea and/or Vomiting        I&O's Summary    06 Apr 2021 07:01  -  07 Apr 2021 07:00  --------------------------------------------------------  IN: 440 mL / OUT: 650 mL / NET: -210 mL        PHYSICAL EXAM:  Vital Signs Last 24 Hrs  T(C): 37.4 (07 Apr 2021 15:22), Max: 37.4 (07 Apr 2021 15:22)  T(F): 99.3 (07 Apr 2021 15:22), Max: 99.3 (07 Apr 2021 15:22)  HR: 105 (07 Apr 2021 15:22) (105 - 131)  BP: 135/84 (07 Apr 2021 15:22) (135/83 - 146/85)  BP(mean): --  RR: 17 (07 Apr 2021 18:03) (16 - 18)  SpO2: 91% (07 Apr 2021 18:03) (89% - 97%)        General appearance: No acute distress, Awake, Alert  HEENT: Normocephalic, Atraumatic, Conjunctiva clear, EOMI  Neck: Supple, No JVD, No tenderness  Lungs: Breath sound equal bilaterally, No wheezes, No rales  Cardiovascular: S1S2, Irregular rhythm  Abdomen: Soft, Nontender, Nondistended, No guarding/rebound, Positive bowel sounds  Extremities: No clubbing, No cyanosis, No edema, No calf tenderness  Neuro: Strength equal bilaterally, No tremors  Psychiatric: Appropriate mood, Normal affect    LABS:                        11.3   9.00  )-----------( 210      ( 07 Apr 2021 07:03 )             33.7     04-07    130<L>  |  95<L>  |  26.0<H>  ----------------------------<  120<H>  4.2   |  20.0<L>  |  1.31<H>    Ca    7.3<L>      07 Apr 2021 07:03    TPro  6.4<L>  /  Alb  2.9<L>  /  TBili  0.7  /  DBili  x   /  AST  50<H>  /  ALT  23  /  AlkPhos  148<H>  04-07    PT/INR - ( 05 Apr 2021 22:49 )   PT: 21.5 sec;   INR: 1.91 ratio         PTT - ( 05 Apr 2021 22:49 )  PTT:31.5 sec  CARDIAC MARKERS ( 05 Apr 2021 22:49 )  x     / <0.01 ng/mL / x     / x     / x              CAPILLARY BLOOD GLUCOSE      POCT Blood Glucose.: 128 mg/dL (07 Apr 2021 16:02)  POCT Blood Glucose.: 118 mg/dL (07 Apr 2021 11:13)  POCT Blood Glucose.: 114 mg/dL (07 Apr 2021 08:03)        RADIOLOGY & ADDITIONAL TESTS:  Results Reviewed:   Imaging Personally Reviewed:  Electrocardiogram Personally Reviewed:

## 2021-04-08 ENCOUNTER — TRANSCRIPTION ENCOUNTER (OUTPATIENT)
Age: 81
End: 2021-04-08

## 2021-04-08 ENCOUNTER — APPOINTMENT (OUTPATIENT)
Dept: HEMATOLOGY ONCOLOGY | Facility: CLINIC | Age: 81
End: 2021-04-08

## 2021-04-08 VITALS
RESPIRATION RATE: 16 BRPM | OXYGEN SATURATION: 97 % | HEART RATE: 116 BPM | DIASTOLIC BLOOD PRESSURE: 81 MMHG | SYSTOLIC BLOOD PRESSURE: 125 MMHG | TEMPERATURE: 98 F

## 2021-04-08 LAB
ANION GAP SERPL CALC-SCNC: 16 MMOL/L — SIGNIFICANT CHANGE UP (ref 5–17)
BUN SERPL-MCNC: 25 MG/DL — HIGH (ref 8–20)
CALCIUM SERPL-MCNC: 6.8 MG/DL — LOW (ref 8.6–10.2)
CHLORIDE SERPL-SCNC: 94 MMOL/L — LOW (ref 98–107)
CO2 SERPL-SCNC: 22 MMOL/L — SIGNIFICANT CHANGE UP (ref 22–29)
CREAT SERPL-MCNC: 1.13 MG/DL — SIGNIFICANT CHANGE UP (ref 0.5–1.3)
GLUCOSE BLDC GLUCOMTR-MCNC: 103 MG/DL — HIGH (ref 70–99)
GLUCOSE BLDC GLUCOMTR-MCNC: 111 MG/DL — HIGH (ref 70–99)
GLUCOSE SERPL-MCNC: 107 MG/DL — HIGH (ref 70–99)
HCT VFR BLD CALC: 32.1 % — LOW (ref 39–50)
HGB BLD-MCNC: 10.2 G/DL — LOW (ref 13–17)
MCHC RBC-ENTMCNC: 26.7 PG — LOW (ref 27–34)
MCHC RBC-ENTMCNC: 31.8 GM/DL — LOW (ref 32–36)
MCV RBC AUTO: 84 FL — SIGNIFICANT CHANGE UP (ref 80–100)
PLATELET # BLD AUTO: 171 K/UL — SIGNIFICANT CHANGE UP (ref 150–400)
POTASSIUM SERPL-MCNC: 3.7 MMOL/L — SIGNIFICANT CHANGE UP (ref 3.5–5.3)
POTASSIUM SERPL-SCNC: 3.7 MMOL/L — SIGNIFICANT CHANGE UP (ref 3.5–5.3)
RBC # BLD: 3.82 M/UL — LOW (ref 4.2–5.8)
RBC # FLD: 15.3 % — HIGH (ref 10.3–14.5)
SODIUM SERPL-SCNC: 132 MMOL/L — LOW (ref 135–145)
WBC # BLD: 5.7 K/UL — SIGNIFICANT CHANGE UP (ref 3.8–10.5)
WBC # FLD AUTO: 5.7 K/UL — SIGNIFICANT CHANGE UP (ref 3.8–10.5)

## 2021-04-08 PROCEDURE — 82962 GLUCOSE BLOOD TEST: CPT

## 2021-04-08 PROCEDURE — 93005 ELECTROCARDIOGRAM TRACING: CPT

## 2021-04-08 PROCEDURE — 71045 X-RAY EXAM CHEST 1 VIEW: CPT

## 2021-04-08 PROCEDURE — 80048 BASIC METABOLIC PNL TOTAL CA: CPT

## 2021-04-08 PROCEDURE — 36415 COLL VENOUS BLD VENIPUNCTURE: CPT

## 2021-04-08 PROCEDURE — 96374 THER/PROPH/DIAG INJ IV PUSH: CPT

## 2021-04-08 PROCEDURE — 85379 FIBRIN DEGRADATION QUANT: CPT

## 2021-04-08 PROCEDURE — 85025 COMPLETE CBC W/AUTO DIFF WBC: CPT

## 2021-04-08 PROCEDURE — 85610 PROTHROMBIN TIME: CPT

## 2021-04-08 PROCEDURE — 82728 ASSAY OF FERRITIN: CPT

## 2021-04-08 PROCEDURE — 84484 ASSAY OF TROPONIN QUANT: CPT

## 2021-04-08 PROCEDURE — 74176 CT ABD & PELVIS W/O CONTRAST: CPT

## 2021-04-08 PROCEDURE — 80053 COMPREHEN METABOLIC PANEL: CPT

## 2021-04-08 PROCEDURE — 96376 TX/PRO/DX INJ SAME DRUG ADON: CPT

## 2021-04-08 PROCEDURE — 86140 C-REACTIVE PROTEIN: CPT

## 2021-04-08 PROCEDURE — U0003: CPT

## 2021-04-08 PROCEDURE — 85730 THROMBOPLASTIN TIME PARTIAL: CPT

## 2021-04-08 PROCEDURE — 99239 HOSP IP/OBS DSCHRG MGMT >30: CPT

## 2021-04-08 PROCEDURE — 96375 TX/PRO/DX INJ NEW DRUG ADDON: CPT

## 2021-04-08 PROCEDURE — 86769 SARS-COV-2 COVID-19 ANTIBODY: CPT

## 2021-04-08 PROCEDURE — 99285 EMERGENCY DEPT VISIT HI MDM: CPT | Mod: 25

## 2021-04-08 PROCEDURE — 85384 FIBRINOGEN ACTIVITY: CPT

## 2021-04-08 PROCEDURE — U0005: CPT

## 2021-04-08 PROCEDURE — 84145 PROCALCITONIN (PCT): CPT

## 2021-04-08 PROCEDURE — 85027 COMPLETE CBC AUTOMATED: CPT

## 2021-04-08 RX ADMIN — RIVAROXABAN 15 MILLIGRAM(S): KIT at 17:05

## 2021-04-08 RX ADMIN — Medication 50 MILLIGRAM(S): at 05:27

## 2021-04-08 RX ADMIN — PANTOPRAZOLE SODIUM 40 MILLIGRAM(S): 20 TABLET, DELAYED RELEASE ORAL at 05:27

## 2021-04-08 RX ADMIN — Medication 50 MILLIGRAM(S): at 17:07

## 2021-04-08 RX ADMIN — Medication 5 MILLIGRAM(S): at 18:06

## 2021-04-08 NOTE — DISCHARGE NOTE NURSING/CASE MANAGEMENT/SOCIAL WORK - PATIENT PORTAL LINK FT
You can access the FollowMyHealth Patient Portal offered by [facility] by registering at the following website: [email or web address]. By joining [employer]’s FollowMyHealth portal, you will also be able to view your health information using other applications (apps) compatible with our system.

## 2021-04-08 NOTE — DISCHARGE NOTE PROVIDER - NSDCFUSCHEDAPPT_GEN_ALL_CORE_FT
LAYLA GALVAN ; 04/20/2021 ; [facility]  LAYLA GALVAN ; 05/11/2021 ; [facility]  LAYLA GALVAN ; 06/01/2021 ; [facility]  LAYLA GALVAN ; 06/28/2021 ; [facility] [address] [place] [address] Rd

## 2021-04-08 NOTE — HOSPICE CARE NOTE - CONVESATION DETAILS
Home hospice referral received. [facility] RN telephoned patient's daughter, Tamy. All aspects of home hospcie services explained with good return understanding. All questions answered; emotional support provided. Daughter Tamy agreed to home hospice services.     Hospice consent forms emailed to daughter at [email or web address].     Writer ordered DME for stat delivery (i.e., within 4 hours).     Daughter will need HHA - hospice can provide 4 hours/day M - F and daughter is also looking to private hire an aide. List of hospice-approved HHA agencies emailed to daughter.     This writer and daughter agreed that daughter will advise when DME delivered, and writer will request ambulance  at that point. Will speak with Dr. Ohara regarding d/c. Collaborated with  Manda Harper LMSW, re: pending d/c. Will continue to follow.

## 2021-04-08 NOTE — DISCHARGE NOTE PROVIDER - HOSPITAL COURSE
Brief Hospital course    Briefly pt was admitted from home with profound weakness after chapin Covid 19. he had been dsc a couple of days prior as he was not in any resp distress, however due to lack of improvement in weakness he came to the ER. His care was discussed with oncology and his daughter (Tamy who is HCP)  and after imaging showed no improvement / worsening of metastatic lesions despite Keytruda therapy he has opted for home hospice care. Hospice aware, planned for dsc home today     Discharge exam :  General appearance: No acute distress, Awake, Alert  HEENT: Normocephalic, Atraumatic, Conjunctiva clear, EOMI  Neck: Supple, No JVD, No tenderness  Lungs: Breath sound equal bilaterally, No wheezes, No rales  Cardiovascular: S1S2, Irregular rhythm  Abdomen: Soft, Nontender, Nondistended, No guarding/rebound, Positive bowel sounds  Extremities: No clubbing, No cyanosis, No edema, No calf tenderness  Neuro: Strength equal bilaterally, No tremors  Psychiatric: Appropriate mood, Normal affect    Time spent on dsc : 50 minutes

## 2021-04-20 ENCOUNTER — APPOINTMENT (OUTPATIENT)
Age: 81
End: 2021-04-20

## 2021-05-11 ENCOUNTER — APPOINTMENT (OUTPATIENT)
Age: 81
End: 2021-05-11

## 2021-06-01 ENCOUNTER — APPOINTMENT (OUTPATIENT)
Age: 81
End: 2021-06-01

## 2021-06-28 ENCOUNTER — APPOINTMENT (OUTPATIENT)
Dept: OTOLARYNGOLOGY | Facility: CLINIC | Age: 81
End: 2021-06-28

## 2021-11-16 NOTE — HISTORY OF PRESENT ILLNESS
[de-identified] : 80 year old gentleman diagnosed with synchronous H&N and lung cancer. He also has a history of RCC, s/p left total nephrectomy and s/p right partial nephrectomy for ?cyst. 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 Completed 6300 cGy radiation therapy to larynx on 1/2/2020. 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  No adjuvant therapy as he was not a candidate for Cisplatin due to solitary partial kidney, and no clear role for carboplatin based therapy in the adjuvant setting. \par \par \par On surveillance CT chest on 8/3/20 - IMPRESSION:\par Enlarging para-aortic lymph nodes suspicious for metastatic disease.\par Lytic destructive lesion within the right 8th rib (series 6 image 73) likely metastatic.\par 4 cm indistinct hypodense lesion within the right lobe of the liver, suspicious for metastatic disease. Further evaluation can be performed with PET-CT or MRI.\par \par He notes left sided chest wall pain since April 2020.\par No significant weight loss. NO SOB. NO headaches, or dizziness.  No changes in vision.\par  [de-identified] : Returns for follow up. \par S/p liver biopsy on 9/3/20 - metastatic carcinoma, poorly differentiated, favoring squamous cell carcinoma\par He has back pain and left sided lateral chest wall pain.\par Notes he takes 1 oxycodone and notes no benefit, he takes 2 and feels groggy.\par Tramadol was tried but made him throw up.\par Using lidocaine patch to some effect to left lateral chest wall. \par \par \par 8/18/20 MRI brain - negative for mets\par 8/18/20 PET - IMPRESSION:\par \par Since PET/CT August 29, 2019:\par 1.  New FDG avid hepatic lesions suspicious for metastatic disease.\par 2.  Destructive lytic lesion left eighth rib and additional lesions left ninth rib and T1 spinous process, suspicious for metastatic disease.\par 3.  FDG avid prevascular lymph nodes probably metastatic.\par 4.  No definite FDG avid recurrent disease in the head and neck.\par 5.  Too small to characterize 0.7 cm right upper lobe pulmonary nodule. Postsurgical changes of left upper lobectomy.\par \par  \par  No

## 2021-11-20 NOTE — H&P PST ADULT - VISION (WITH CORRECTIVE LENSES IF THE PATIENT USUALLY WEARS THEM):
negative - no vomiting, no diarrhea Partially impaired: cannot see medication labels or newsprint, but can see obstacles in path, and the surrounding layout; can count fingers at arm's length

## 2022-04-02 NOTE — HISTORY OF PRESENT ILLNESS
[facility]  Progress Note - Dakota Cirri 1939, 78 y o  female MRN: [id number]  Unit/Bed#: -[facility] Encounter: [id number]  Primary Care Provider: Nannette Zamarripa DO   Date and time admitted to hospital: 3/30/2022  4:36 PM    * Cancer related pain  Assessment & Plan  · CT shows progression of disease as well as extensive DVT in IVC/left lower extremity  · Palliative care following  · Continue pain analgesics regimen as recommended for palliative    DVT (deep venous thrombosis) (HCC)  Assessment & Plan  · B/l doppler LE: acute- subacute occlusive thrombus noted in the common femoral vein, deep femoral vein, and proximal to distal superficial femoral vein  acute- subacute occlusive thrombophlebitis noted in the great saphenous vein from proximal to distal thigh  No RLE DVT  · CT c/a/p: Occlusive bland infarct or tumor thrombus in the infrahepatic IVC extending inferiorly and likely involves the iliac vessels with extension to the left lower extremity  Suspected small PE in LLL  No large central PE    · Provoked secondary to underlying malignancy  · Discussion had with patient's daughter  Her preference is to not continue on anticoagulation upon discharge given patient is a fall risk  · While hospitalized will continue on IV heparin drip for now    Hyponatremia  Assessment & Plan  · Nephrology following  · IV fluids changed to 0 9% normal saline  · Continue serial BMP    Malignant neoplasm of overlapping sites of bladder [facility])  Assessment & Plan  · Malignant High-grade invasive urethral carcinoma; progression and infiltration of R renal mass  · Patient elected for palliative care does not wish to pursue surgery or oncologic intervention  · Palliative care on board  · Extensive discussion with patient's daughter  They are not ready for hospice    The hope is for possible rehabilitation and then after which will likely transition to home hospice    Hypercalcemia  Assessment & Plan  · Acute on chronic elevation, likely due to malignancy  · Nephrology following  · Continue IVF hydration    Compression fracture of L2 lumbar vertebra [facility])  Assessment & Plan  · Suspect pathological fracture given underlying malignancy  · Spoke at length with patient's daughter, does not wish for invasive intervention  She also does not wish for the patient to wear back brace for comfort purposes  Pyuria  Assessment & Plan  · Urine culture revealing Staph coag-negative  Also from prior urine culture in 2021  · Likely colonization  · No sign of acute infectious process  · Monitor off antibiotics    TILA (acute kidney injury) ([facility] [address] )  Assessment & Plan  · Multifactorial, Secondary to underlying malignancy as infiltrating renal mass, contrast induced nephropathy, hypercalcemia  · Nephrology on board  · Avoid nephrotoxins, renally dose meds      VTE Pharmacologic Prophylaxis:   Pharmacologic: Heparin Drip  Mechanical VTE Prophylaxis in Place: No    Patient Centered Rounds: I have performed bedside rounds with nursing staff today  Discussions with Specialists or Other Care Team Provider:     Education and Discussions with Family / Patient:  Patient and her daughter    Time Spent for Care: 30 minutes  More than 50% of total time spent on counseling and coordination of care as described above  Current Length of Stay: 2 day(s)    Current Patient Status: Inpatient   Certification Statement: The patient will continue to require additional inpatient hospital stay due to Hyponatremia, hypocalcemia, neoplasm related pain, acute thrombolic disease    Discharge Plan:  Eventually to SNF    Code Status: Level 3 - DNAR and DNI      Subjective:   Positive back and lower extremity pain but relieved with pain analgesics    Denies dyspnea    Objective:     Vitals:   Temp (24hrs), Av 8 °F (36 6 °C), Min:97 2 °F (36 2 °C), Max:98 2 °F (36 8 °C)    Temp:  [97 2 °F (36 2 °C)-98 2 °F (36 8 °C)] 98 2 °F (36 8 °C)  HR:  [89] 89  Resp:  [19] 19  BP: (116-118)/(64-65) 118/65  SpO2:  [92 %-96 %] 92 %  Body mass index is 29 06 kg/m²  Input and Output Summary (last 24 hours): Intake/Output Summary (Last 24 hours) at 4/2/2022 1413  Last data filed at 4/2/2022 0640  Gross per 24 hour   Intake 390 ml   Output 900 ml   Net -510 ml       Physical Exam:     Physical Exam  Cardiovascular:      Rate and Rhythm: Normal rate and regular rhythm  Pulses: Normal pulses  Heart sounds: Normal heart sounds  No murmur heard  Pulmonary:      Effort: Pulmonary effort is normal  No respiratory distress  Breath sounds: Normal breath sounds  No wheezing or rales  Abdominal:      General: Abdomen is flat  Bowel sounds are normal  There is no distension  Palpations: Abdomen is soft  Tenderness: There is no abdominal tenderness  There is no guarding  Musculoskeletal:         General: Normal range of motion  Cervical back: Normal range of motion and neck supple  Right lower leg: No edema  Left lower leg: Edema present  Skin:     General: Skin is warm and dry  Neurological:      General: No focal deficit present  Mental Status: She is alert and oriented to person, place, and time  Mental status is at baseline  Cranial Nerves: No cranial nerve deficit  Motor: No weakness  Additional Data:     Labs:    Results from last 7 days   Lab Units 04/02/22  0533 03/30/22  2132 03/30/22  1701   WBC Thousand/uL 11 06*   < > 14 50*   HEMOGLOBIN g/dL 8 9*   < > 9 6*   HEMATOCRIT % 28 1*   < > 30 0*   PLATELETS Thousands/uL 257   < > 240   NEUTROS PCT %  --   --  87*   LYMPHS PCT %  --   --  5*   MONOS PCT %  --   --  6   EOS PCT %  --   --  0    < > = values in this interval not displayed       Results from last 7 days   Lab Units 04/02/22  0924 03/31/22  1323 03/30/22  1701   SODIUM mmol/L 127*   < > 124*   POTASSIUM mmol/L 3 9   < > 4 2   CHLORIDE mmol/L 92*   < > 88*   CO2 mmol/L 25   < > 28   BUN mg/dL 26*   < > 36*   CREATININE mg/dL 1 38*   < > 1 71*   ANION GAP mmol/L 10   < > 8   CALCIUM mg/dL 11 5*   < > 11 1*   ALBUMIN g/dL  --   --  2 6*   TOTAL BILIRUBIN mg/dL  --   --  0 28   ALK PHOS U/L  --   --  90   ALT U/L  --   --  18   AST U/L  --   --  28   GLUCOSE RANDOM mg/dL 111   < > 132    < > = values in this interval not displayed  Results from last 7 days   Lab Units 03/30/22  2132   INR  1 18                       * I Have Reviewed All Lab Data Listed Above  * Additional Pertinent Lab Tests Reviewed: All Labs Within Last 24 Hours Reviewed    Imaging:    Imaging Reports Reviewed Today Include:   Imaging Personally Reviewed by Myself Includes:      Recent Cultures (last 7 days):     Results from last 7 days   Lab Units 03/31/22  1203   URINE CULTURE  >100,000 cfu/ml Staphylococcus coagulase negative*       Last 24 Hours Medication List:   Current Facility-Administered Medications   Medication Dose Route Frequency Provider Last Rate    acetaminophen  650 mg Oral Q6H PRN Americo Art MD      aluminum-magnesium hydroxide-simethicone  30 mL Oral Q6H PRN Kia Bagley PA-C      heparin (porcine)  3-30 Units/kg/hr (Order-Specific) Intravenous Titrated Sarah Obregon MD 21 571 Units/kg/hr (04/02/22 0422)    HYDROmorphone  0 5 mg Intravenous Q4H PRN Sarah Obregon MD      ondansetron  4 mg Intravenous Q6H PRN Kia Bagley PA-C      oxyCODONE  2 5 mg Oral Q4H PRN Sarah Obregon MD      oxyCODONE  5 mg Oral Q4H PRN Sarah Obregon MD      polyethylene glycol  17 g Oral Daily Kia Bagley PA-C      sodium chloride  75 mL/hr Intravenous Continuous Deshawn Pop MD 75 mL/hr (04/02/22 1218)        Today, Patient Was Seen By: Danette Brower DO    ** Please Note: Dictation voice to text software may have been used in the creation of this document   ** [FreeTextEntry1] : This is an 80 y/o man with a significant smoking history (120 pack years) and a remote history of kidney cancer, s/p left total nephrectomy and partial right nephrectomy.  Patient is also s/p wedge resection of SHELLI, completion left upper lobectomy, MLND on 10/21/19 for pulmonary nodule.  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  Completed 6,300 cGy radiation therapy to the Larynx on 1/2/2020.\par \par CT Chest 1/20/20 Showed stable small solid nodule in the right upper lobe when compared to previous exam.\par \par Hoarseness, odynophagia resolved. Reports dysgeusia, xerostomia improved, excessive mucus production is less and does not keep him awake at night.  Tolerates regular diet.\par \par Has developed intermittent left chest wall pain , at times requiring narcotic analgesia at night over the past 4 months. axial imaging of the chest has not revealed any abnormalities except remote surgical changes and degenerative changes in the spine.\par

## 2022-05-31 NOTE — END OF VISIT
Colonoscopy   WHAT YOU NEED TO KNOW:   A colonoscopy is a procedure to examine the inside of your colon (intestine) with a scope  Polyps or tissue growths may have been removed during your colonoscopy  It is normal to feel bloated and to have some abdominal discomfort  You should be passing gas  If you have hemorrhoids or you had polyps removed, you may have a small amount of bleeding  DISCHARGE INSTRUCTIONS:   Seek care immediately if:   You have sudden, severe abdominal pain  You have problems swallowing  You have a large amount of black, sticky bowel movements or blood in your bowel movements  You have sudden trouble breathing  You feel weak, lightheaded, or faint or your heart beats faster than normal for you  Contact your healthcare provider if:   You have a fever and chills  You have nausea or are vomiting  Your abdomen is bloated or feels full and hard  You have abdominal pain  You have black, sticky bowel movements or blood in your bowel movements  You have not had a bowel movement for 3 days after your procedure  You have rash or hives  You have questions or concerns about your procedure  Activity:   Do not lift, strain, or run for 24 hours after your procedure  Rest after your procedure  You have been given medicine to relax you  Do not drive or make important decisions until the day after your procedure  Return to your normal activity as directed  Relieve gas and discomfort from bloating by lying on your right side with a heating pad on your abdomen  You may need to take short walks to help the gas move out  Eat small meals until bloating is relieved  Follow up with your healthcare provider as directed: Write down your questions so you remember to ask them during your visits  If you take a blood thinner, please review the specific instructions from your endoscopist about when you should resume it   These can be found in the Recommendation and Your Medication list sections of this After Visit Summary  [>50% of Time Spent on Counseling and Coordination of Care for  ___] : Greater than 50% of the encounter time was spent on counseling and coordination of care for [unfilled] [Time Spent: ___ minutes] : I have spent [unfilled] minutes of face to face time with the patient

## 2022-06-20 NOTE — H&P PST ADULT - [facility]
"   06/20/22 1604   Quick Adds   Type of Visit Initial Occupational Therapy Evaluation   Living Environment   People in Home alone   Current Living Arrangements mobile home   Home Accessibility no concerns  (Single-level home, ramp to enter)   Transportation Anticipated car, drives self;family or friend will provide   Living Environment Comments Pt lives alone in a mobile home w/ an aluminum ramp to enter. Pt has a walk-in shower with grab bars.   Self-Care   Usual Activity Tolerance good   Current Activity Tolerance fair   Regular Exercise No   Equipment Currently Used at Home walker, rolling;grab bar, toilet;grab bar, tub/shower;shower chair  (mobility scooter)   Fall history within last six months yes   Number of times patient has fallen within last six months 1   Activity/Exercise/Self-Care Comment Pt is IND-Mod I w/ ADLs. Pt uses 4WW for mobility in his house, recently purchased an electric scooter for community mobility. Pt does not have formal exercise routine, likes to stay busy w/ hobbies. Pt reporting decreased endurance limits his activities over past month or so.   Instrumental Activities of Daily Living (IADL)   Previous Responsibilities meal prep;housekeeping;laundry;shopping;yardwork;medication management;finances;driving   IADL Comments Pt is IND w/ ADL/IADLs. Pt is a . Pt likes to garden and restore outboard motors.   General Information   Onset of Illness/Injury or Date of Surgery 06/20/22   Referring Physician Shadi Canchola MD   Patient/Family Therapy Goal Statement (OT) To improve endurance, to go home.   Additional Occupational Profile Info/Pertinent History of Current Problem Per chart: \"Delbert Tilley is a 71 year old male, past medical history is significant for obesity, ASCVD with history of NSTEMI, cardiac defibrillator implanted, pancreatitis, CHF, PAD, idiopathic peripheral autonomic neuropathy, venous insufficiency, actinic keratoses, hyperparathyroidism thrombocytopenia, " "hypocalcemia, hypertension, ischemic cardiomyopathy, hypomagnesemia, anemia, stage III renal disease, hypotension, COPD, NSVT, type 2 diabetes, alcohol abuse in remission, GERD, nicotine dependence (the patient is still currently a 2 pack/day smoker)\"   Existing Precautions/Restrictions fall   Left Upper Extremity (Weight-bearing Status) full weight-bearing (FWB)   Right Upper Extremity (Weight-bearing Status) full weight-bearing (FWB)   Left Lower Extremity (Weight-bearing Status) full weight-bearing (FWB)   Right Lower Extremity (Weight-bearing Status) full weight-bearing (FWB)   General Observations and Info Activity order: Up ad jos   Cognitive Status Examination   Orientation Status orientation to person, place and time   Cognitive Status Comments No acute concerns. Pt seeming mildly [place], needed OT to repeat questions or cues before responding, then following multi-step instructions accurately.   Visual Perception   Visual Impairment/Limitations corrective lenses for reading   Sensory   Sensory Comments Pt reports neuropathy in Justin feet, needs to wear shoes for safe mobility   Pain Assessment   Patient Currently in Pain No   Integumentary/Edema   Integumentary/Edema no deficits were identifed   Posture   Posture protracted shoulders   Range of Motion Comprehensive   General Range of Motion bilateral upper extremity ROM WFL   Comment, General Range of Motion WFL for ADLs and mobility   Strength Comprehensive (MMT)   General Manual Muscle Testing (MMT) Assessment no strength deficits identified   Comment, General Manual Muscle Testing (MMT) Assessment WFL for ADLs and mobility   Coordination   Upper Extremity Coordination No deficits were identified   Bed Mobility   Comment (Bed Mobility) SBA   Transfers   Transfers sit-stand transfer   Sit-Stand Transfer   Sit-Stand [place] (Transfers) contact guard   Assistive Device (Sit-Stand Transfers) walker, front-wheeled   Balance   Balance Comments Ambulated on 2L " O2 w/ FWW and SBA. No overt LOB during ambulation w/ FWW, will continue to assess.   Activities of Daily Living   BADL Assessment/Intervention bathing;upper body dressing;lower body dressing;grooming;toileting   Bathing Assessment/Intervention   Position (Bathing) unsupported sitting   [place] Level (Bathing) set up;supervision   Assistive Devices (Bathing) shower chair   Upper Body Dressing Assessment/Training   Position (Upper Body Dressing) unsupported sitting   [place] Level (Upper Body Dressing) set up   Lower Body Dressing Assessment/Training   Position (Lower Body Dressing) unsupported sitting   [place] Level (Lower Body Dressing) set up;supervision   Grooming Assessment/Training   Position (Grooming) supported standing;unsupported sitting   [place] Level (Grooming) set up   Toileting   Position (Toileting) unsupported sitting;supported standing   [place] Level (Toileting) set up;supervision   Clinical Impression   Criteria for Skilled Therapeutic Interventions Met (OT) Yes, treatment indicated   OT Diagnosis Impaired ADL/IADL performance   OT Problem List-Impairments impacting ADL problems related to;activity tolerance impaired;strength  (increased O2 needs)   Assessment of Occupational Performance 3-5 Performance Deficits   Identified Performance Deficits housekeeping, community mobility, leisure   Planned Therapy Interventions (OT) ADL retraining;IADL retraining;strengthening;home program guidelines;progressive activity/exercise   Intervention Comments ambulation or NuStep for CR, EC/WS techniques   Clinical Decision Making Complexity (OT) low complexity   Anticipated Equipment Needs Upon Discharge (OT)   (TBD)   Risk & Benefits of therapy have been explained evaluation/treatment results reviewed;care plan/treatment goals reviewed;risks/benefits reviewed;current/potential barriers reviewed;participants voiced agreement with care plan;participants included;patient   Clinical  Impression Comments Pt presents near ADL baseline, however IADL performance is limited by reduced activity tolerance. Pt will benefit from IP OT to promote ADL IND and safety.   OT Discharge Planning   OT Discharge Recommendation (DC Rec) home with assist;home with outpatient cardiac rehab   OT Rationale for DC Rec Pt is near ADL baseline, limited primarily by reduced activity tolerance. Pt likely to be safe to discharge home w/ assist for heavier IADLs. Pt would benefit from OP CR to promote strength, endurance, and cardiovascular health.   OT Brief overview of current status SBA w/ FWW, SpO2 = 95% on 2L during activity   Total Evaluation Time (Minutes)   Total Evaluation Time (Minutes) 8   OT Goals   Therapy Frequency (OT) 5 times/wk   OT Predicted Duration/Target Date for Goal Attainment 07/01/22   OT Goals Lower Body Dressing;Home Management;Cardiac Phase 1   OT: Lower Body Dressing Modified independent;including set-up/clothing retrieval   OT: Home Management Modified independent;ambulatory level   OT: Understanding of cardiac education to maximize quality of life, condition management, and health outcomes Patient;Demonstrate   OT: Perform aerobic activity with stable cardiovascular response continuous;15 minutes;ambulation      normal PMI

## 2022-09-01 NOTE — ASU DISCHARGE PLAN (ADULT/PEDIATRIC) - NURSING INSTRUCTIONS
Home Care Instructions after an Epidural Steroid Pain Injection    A lumbar epidural steroid injection delivers steroid medication directly into the area that may be causing your lower back pain and/or leg pain. A cervical or thoracic epidural steroid injection delivers steroids into the epidural space surrounding spinal nerve roots to help relieve pain in the upper spine/neck.    Activity  -Rest today  -Do not work today  -Resume normal activity tomorrow  -DO NOT shower for 24 hours  -DO NOT remove bandaid for 24 hours    Pain  -You may experience soreness at the injection site for one or two days  -You may use an ice pack for 20 minutes every 2 hours for the first 24 hours  -You may use a heating pad after the first 24 hours  -You may use Tylenol (acetaminophen) every 4 hours or other pain medicines as     directed by your physician    You may experience numbness radiating into your legs or arms (depending on the procedure location). This numbness may last several hours. Until sensation returns to normal; please use caution in walking, climbing stairs, and stepping out of your vehicle, etc.    Please contact us if you have:  -Severe pain  -Fever more than 101.5 degrees Fahrenheit  -Signs of infection at the injection site (redness, swelling, or drainage)    If you have questions, please contact our office at [phone] between the hours of 7:00 am and 3:00 pm Monday through Friday. After office hours you can contact the on call provider by dialing [phone]. If you need immediate attention, we recommend that you go to a hospital emergency room or dial [phone].     You received tylenol at 7:30 PM on 8/8/19.  Do not take any tylenol (acetaminophen) containing products until after 1:30 am on 8/9/19

## 2022-09-16 NOTE — DISCHARGE NOTE PROVIDER - PROVIDER TOKENS
Case Management Assessment            Discharge Note                    Date / Time of Note: 9/16/2022 3:39 PM                  Discharge Note Completed by: Ivonne Dakins, MSW, BRAYDON    Patient Name: Maddie Gerard   YOB: 1964  Diagnosis: Brain neoplasm [facility]) [D49.6]  Brain tumor [facility]) [D49.6]   Date / Time: 9/13/2022  8:58 AM    Current PCP: No primary care provider on file. Clinic patient: No    Hospitalization in the last 30 days: No    Advance Directives:  Code Status: Full Code  [place] DNR form completed and on chart: No    Financial:  Payor: Ev Gamboa / Plan: BCBS - [place] PPO / Product Type: *No Product type* /      Pharmacy:    [facility]/pharmacy #[id number]- [place], [address] [place] [address]. JOHANA BERNADINEDiane Rodarte [phone] - F [phone]  [address] [place], [place] [address]Diane [facility] [address]  Phone: [phone] Fax: [phone]      Assistance purchasing medications?:    Assistance provided by Case Management: None at this time    Does patient want to participate in local refill/ meds to beds program?: Yes    Meds To Beds General Rules:  1. Can ONLY be done Monday- Friday between 8:30am-5pm  2. Prescription(s) must be in pharmacy by 3pm to be filled same day  3. Copy of patient's insurance/ prescription drug card and patient face sheet must be sent along with the prescription(s)  4. Cost of Rx cannot be added to hospital bill. If financial assistance is needed, please contact unit  or ;  or  CANNOT provide pharmacy voucher for patients co-pays  5.  Patients can then  the prescription on their way out of the hospital at discharge, or pharmacy can deliver to the bedside if staff is available. (payment due at time of pick-up or delivery - cash, check, or card accepted)     Able to afford home medications/ co-pay costs: Yes    ADLS:  Current PT AM-PAC Score: 23 /24  Current OT AM-PAC Score: 24 /24      [facility] alternate solutions     LOC at discharge: Not Applicable  IWONA Completed: No    Notification completed in HENS/PAS?:  Not Applicable    IMM Completed:   Not Indicated    Transportation:  Transportation PLAN for discharge: family   Mode of Transport: [address] ordered at discharge: Yes  [address]: Ivan Mack  Phone: [phone]  Fax: [phone]  Orders faxed: Yes    Durable Medical Equipment:  DME Provider: none  Equipment obtained during hospitalization: none    Home Oxygen and Respiratory Equipment:  Oxygen needed at discharge?: No    Dialysis:  Dialysis patient: No    Referrals made at [facility] for outpatient continued care:  Not Applicable    Additional CM Notes:     SW met w/Pt at bedside. pt is from home alone in a 2 family house. His sister lives upstairs. He is independent with self care and functional mobility pta. Pt will DC home w/HHC. Pt's family will transport him home. Christine from [facility], set Pt  up w/alt solutions for [facility]. The Plan for Transition of Care is related to the following treatment goals of Brain neoplasm [facility]) [D49.6]  Brain tumor [facility]) [D49.6]    The Patient and/or patient representative Pillo Ramos and his family were provided with a choice of provider and agrees with the discharge plan Yes    Freedom of choice list was provided with basic dialogue that supports the patient's individualized plan of care/goals and shares the quality data associated with the providers.  Yes    Care Transitions patient: No    IGNACIO Estevez, Yael Mesa  [facility]  Case Management Department  Ph: [phone] PROVIDER:[TOKEN:[[id number]],FOLLOWUP:[2 weeks]]

## 2022-10-06 NOTE — HISTORY OF PRESENT ILLNESS
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 pt has hx of hoarseness on and off and recently went to see Dr. Paz and did laryngoscopy showed left VC with rough irregularity. + ETOH - beers 2-3, quit 20 yrs ago smoking.  He has an approx. 120 pack year history. He denies any dyspnea, dysphagia, otalgia or weight loss.  He also denies hemoptysis.
2

## 2023-07-02 NOTE — VITALS
6 [Maximal Pain Intensity: 8/10] : 8/10 [Least Pain Intensity: 3/10] : 3/10 [Pain Description/Quality: ___] : Pain description/quality: [unfilled] [Pain Duration: ___] : Pain duration: [unfilled] [Pain Location: ___] : Pain Location: [unfilled] [Pain Interferes with ADLs] : Pain interferes with activities of daily living. [OTC] : OTC [Opioid] : opioid [70: Cares for self; unalbe to carry on normal activity or do active work.] : 70: Cares for self; unable to carry on normal activity or do active work.

## 2024-10-02 NOTE — H&P PST ADULT - VENOUS THROMBOEMBOLISM AGE
----- Message from Willie sent at 10/2/2024  8:21 AM CDT -----  Contact: annie/[facility] [place] with [facility] is requesting that the nurse fax over the patients lab orders. Please give her a  call back at [phone] if any questions.   #Didn't provide fax number#  
(3) age 75 years or over

## 2025-04-25 NOTE — ASU PATIENT PROFILE, ADULT - TEACHING/LEARNING FACTORS IMPACT ABILITY TO LEARN
none
FAMILY HISTORY:  Father  Still living? Unknown  FH: pancreatic cancer, Age at diagnosis: Age Unknown    Mother  Still living? Unknown  FH: COPD (chronic obstructive pulmonary disease), Age at diagnosis: Age Unknown